# Patient Record
Sex: FEMALE | Race: WHITE | NOT HISPANIC OR LATINO | Employment: FULL TIME | URBAN - METROPOLITAN AREA
[De-identification: names, ages, dates, MRNs, and addresses within clinical notes are randomized per-mention and may not be internally consistent; named-entity substitution may affect disease eponyms.]

---

## 2021-08-19 ENCOUNTER — ULTRASOUND (OUTPATIENT)
Dept: OBGYN CLINIC | Facility: CLINIC | Age: 26
End: 2021-08-19
Payer: COMMERCIAL

## 2021-08-19 VITALS — DIASTOLIC BLOOD PRESSURE: 70 MMHG | SYSTOLIC BLOOD PRESSURE: 108 MMHG | WEIGHT: 120.8 LBS

## 2021-08-19 DIAGNOSIS — N91.1 SECONDARY AMENORRHEA: Primary | ICD-10-CM

## 2021-08-19 PROCEDURE — 76817 TRANSVAGINAL US OBSTETRIC: CPT | Performed by: STUDENT IN AN ORGANIZED HEALTH CARE EDUCATION/TRAINING PROGRAM

## 2021-08-19 PROCEDURE — 99204 OFFICE O/P NEW MOD 45 MIN: CPT | Performed by: STUDENT IN AN ORGANIZED HEALTH CARE EDUCATION/TRAINING PROGRAM

## 2021-08-26 ENCOUNTER — TELEMEDICINE (OUTPATIENT)
Dept: OBGYN CLINIC | Facility: CLINIC | Age: 26
End: 2021-08-26

## 2021-08-26 DIAGNOSIS — Z34.01 ENCOUNTER FOR SUPERVISION OF NORMAL FIRST PREGNANCY IN FIRST TRIMESTER: Primary | ICD-10-CM

## 2021-08-26 PROCEDURE — OBC: Performed by: STUDENT IN AN ORGANIZED HEALTH CARE EDUCATION/TRAINING PROGRAM

## 2021-08-26 RX ORDER — ACETAMINOPHEN 325 MG/1
650 TABLET ORAL EVERY 6 HOURS PRN
COMMUNITY
End: 2021-10-21 | Stop reason: ALTCHOICE

## 2021-08-26 NOTE — PROGRESS NOTES
OB INTAKE INTERVIEW  Patient is 26 y o y o  who presents for OB intake at 12-0 wks  She is accompanied by: phone interview  The father of her baby Lisa Gonzalez is involved in the pregnancy and they are     Last Menstrual Period: 2021  Ultrasound: Measured 11 weeks 0 days on 2021  Estimated Date of Delivery: 3/10/22 via US & LMP     Signs/Symptoms of Pregnancy  Current pregnancy symptoms: none  Constipation no  Headaches no  Cramping/spotting no  PICA cravings no    Diabetes-    If patient has 1 or more, please order early 1 hour GTT  History of GDM no  BMI >35 no  History of PCOS or current metformin use no  History of LGA/macrosomic infant (4000g/9lbs) no    If patient has 2 or more, please order early 1 hour GTT  BMI>30 no  AMA no  First degree relative with type 2 diabetes no  History of chronic HTN, hyperlipidemia, elevated A1C no  High risk race (, , ,  or ) no    Hypertension- if you answer yes, please order preeclampsia labs (cbc, comprehensive metabolic panel, urine protein creatinine ratio, uric acid)  History of of chronic HTN no  History of gestational HTN no  History of preeclampsia, eclampsia, or HELLP syndrome no  History of diabetes no  History of lupus, autoimmune disease, kidney disease no    Thyroid- if yes order TSH with reflex T4  History of thyroid disease no    Bleeding Disorder or Hx of DVT-patient or first degree relative with history of  Order the following if not done previously     (Factor V, antithrombin III, prothrombin gene mutation, protein C and S Ag, lupus anticoagulant, anticardiolipin, beta-2 glycoprotein)   no    OB/GYN-  History of abnormal pap smear no  History of HPV no  History of Herpes/HSV no  History of other STI (gonorrhea, chlamydia, trich) no  History of prior  no  History of prior  no  History of  delivery prior to 36 weeks 6 days no  History of blood transfusion no  Ok for blood transfusion yes    Substance screening- if yes outside of tobacco for her or anyone in her home-order urine drug screen  History of tobacco use no  Currently using tobacco no  Currently using alcohol no  Presently using drugs no  Past drug use  no  IV drug use-If yes add Hep C antibody to labs no  Partner drug use YES-marijuana Parent/Family drug use no     MRSA Screening-   Does the pt have a hx of MRSA? no  If yes- please follow MRSA protocol and obtain a nasal swab for MRSA culture    Immunizations:  Influenza vaccine given this season no  Discussed Tdap vaccine yes  Discussed COVID Vaccine yes      Genetic/MFM-  Do you or your partner have a history of any of the following in yourselves or first degree relatives? Cystic fibrosis no  Spinal muscular atrophy no  Hemoglobinopathy/Sickle Cell/Thalassemia no  Fragile X Intellectual Disability no    If yes, discuss carrier screening and recommend consultation with [facility]/genetic counseling  If no, discuss option for carrier screening and/or genetic testing with Nuchal Ultrasound  Patient interested yes  Appointment at [facility] made no- pt to call today for an appt    Interview education  [facility] Pregnancy Essentials Book reviewed and discussed yes    Nurse/Family Partnership- patient may qualify no; referral placed no    Prenatal lab work scripts yes  Extra labs ordered:  no    The patient has a history now or in prior pregnancy notable for:    Pt had Covid 2021 -  Pt is considering covid vaccine in 2nd trimester  Details that I feel the provider should be aware of: 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

## 2021-08-30 ENCOUNTER — TELEPHONE (OUTPATIENT)
Dept: PERINATAL CARE | Facility: CLINIC | Age: 26
End: 2021-08-30

## 2021-08-30 PROBLEM — Z34.01 ENCOUNTER FOR SUPERVISION OF NORMAL FIRST PREGNANCY IN FIRST TRIMESTER: Status: ACTIVE | Noted: 2021-08-30

## 2021-08-30 NOTE — ASSESSMENT & PLAN NOTE
Liza Ojeda is a 31y o  year old  at 12 5 weeks  presents for initial prenatal visit  Planned pregnancy  Works as a      PMHx noncontributory  The patient denies complaints  She was having N&V but that is starting to resolve  Denies pelvic pain, bleeding, LOF  Exam WNL  Prenatal labs WNL  Rh pos  Pap and gc/chl sent today  Reviewed options for low risk aneuploidy screening  Discussed risks/benefits/limitations  The patient reports she is scheduled for [facility] appt  Patient Education: Patient was counseled regarding diet, exercise, weight gain, foods to avoid, vaccines in pregnancy, aneuploidy screening, travel precautions to include seat belt use and VTE risk reduction  She has been provided our pregnancy packet which includes pregnancy warning signs,how and when to contact providers, visit intervals, Maternal fetal medicine information, medication recommendations that are safe during pregnancy, dietary suggestions, activity recommendations, breastfeeding information as well as websites for additional information, and delivery location

## 2021-08-30 NOTE — TELEPHONE ENCOUNTER
Phone call to patient and confirmed MFM NT US appt  Explained MFM sent an important message via [facility] this call was to follow up on Genetic Counselor video and if she had any questions regarding insurance? Patient states she watched video, found informative and checked her insurance, no questions

## 2021-08-30 NOTE — PATIENT INSTRUCTIONS
Maternal Fetal Medicine     Nuchal Translucency ( NT) ultrasound is offered in the first trimester of pregnancy to assess your developing baby and look for any markers that may indicate a risk for certain chromosomal conditions such as Down's syndrome  This ultrasound is offered to all pregnant women along with several options for blood screening  During your ultrasound appointment the Perinatologist will discuss these options and together you can decide which screen is best for you  We encourage you to view the following video prior to your appointment to learn more:  [email or web address]     Please check your individual insurance plan to determine if the screening is covered, requires prior authorization or if you will incur any out of pocket cost    It is also important to know if your insurance company has a preferred in network lab such as [facility] or [facility]      Below is list of CPT codes for blood screening options  CPT codes are procedure codes that tell your insurance company what testing is being done  In order for testing to be performed in a timely and efficient manner it is best if you have this information available before your first visit with our office  Please note, [facility] is called [facility]  Sequential Screen - 2 part screen, CPT codes are dependent on the lab used:     [facility] lab    Part 1 code 83077,75326      Part 2 code 04633,87882,07567, [address] [facility] 1 code  51494                Part 2 code 37245        NIPT (cell free DNA testing)  CPT code 54014        NIPT testing through [address] [place] [address] is called [id number]  Please use the  @ www  EDMdesigner   > click estimate my cost>  pregnancy>   [email or web address] plus  OR call # [phone] and speak to a      Be sure to ask about the [employer] program for additional financial assistance  NIPT testing through [employer] is called [facility]   Please use the  @ www  [email or web address]/mediaBunker  If you have any questions please feel free to reach out to our office at [phone]  Genetic Counseling appointments are available for any patient but strongly encouraged for Moms 28 or older or patients with family history of genetic disorders  Valuable Online Resource:    [facility] pregnancy essential guide    [email or web address]      On the right side of the screen is a 50 page guide providing valuable information about your entire pregnancy  On the left hand side of the site you will see several other links to great information and resources that [facility] offers     If you click on the tab that says "Pregnancy and Birth Packet" this opens another 150 page guide to labor and delivery information as well as breast feeding information,  care, pediatricians, car seat safety and much more     The [facility] and [address] tab has a virtual tour of the new L&D unit, as well as valuable information about classes that are offered, breast feeding support, support groups and much more  Click around and enjoy all we have to offer! Please note that all information in regards to locations and visiting hours have not been updated due to COVID    We only deliver our babies at one location which is at [facility] [address], [address] [facility] encourages vaccination to prevent from developing a severe COVID virus infection and the resultant maternal and fetal complications that can arise with a severe infection  Receiving the vaccine supplies antibodies directly to your baby through the placenta and through breastfeeding  We discussed that SARS-CoV-2  (COVID) vaccination is an option for  eligible pregnant and lactating women  The following information is from [facility]:    The [place] and Moderna mRNA  vaccines have not been tested in pregnant   women and breastfeeding women yet  None of the vaccines have live virus   and do not contain ingredients that are known to be harmful to pregnant   women or to the fetus  Any risks to the fetus are thought to be low and   theoretical   Some risks of the vaccine including injection site   reactions, fatigue, headache, myalgias, chills, and fever; fever can be   treated with acetaminophen  Allergic reactions have been reported to be   rare  The Leafy Muckle (Aponte Trina) Adenovector  vaccine has been associated   with a rare complication in women at < 48years of age  Until more data is   available [facility] is not recommending the use of this vaccine in pregnancy  ACOG and SMFM recommends that pregnant women have access to a vaccine and   that the vaccine not be withheld from pregnant or lactating women  For a COVID-19 Vaccine in Pregnancy Decision Aid, go to   https://[email or web address] [email or web address]      The ABM (Academy of Breastfeeding Medicine) Statement on Considerations   for COVID-19 Vaccination in Lactation is available at:   http://garo-fortune com/   -in-lactation  Finally, the CDC statement on Vaccination Consideration for People who are   Pregnant or Breastfeeding is available at:   InstantTyping com pt   y html

## 2021-08-31 ENCOUNTER — INITIAL PRENATAL (OUTPATIENT)
Dept: OBGYN CLINIC | Facility: CLINIC | Age: 26
End: 2021-08-31

## 2021-08-31 VITALS — DIASTOLIC BLOOD PRESSURE: 60 MMHG | WEIGHT: 123 LBS | SYSTOLIC BLOOD PRESSURE: 108 MMHG

## 2021-08-31 DIAGNOSIS — Z34.01 ENCOUNTER FOR SUPERVISION OF NORMAL FIRST PREGNANCY IN FIRST TRIMESTER: Primary | ICD-10-CM

## 2021-08-31 LAB
SL AMB  POCT GLUCOSE, UA: NORMAL
SL AMB POCT URINE PROTEIN: NORMAL

## 2021-08-31 PROCEDURE — PNV: Performed by: OBSTETRICS & GYNECOLOGY

## 2021-08-31 PROCEDURE — 87591 N.GONORRHOEAE DNA AMP PROB: CPT | Performed by: OBSTETRICS & GYNECOLOGY

## 2021-08-31 PROCEDURE — G0145 SCR C/V CYTO,THINLAYER,RESCR: HCPCS | Performed by: OBSTETRICS & GYNECOLOGY

## 2021-08-31 PROCEDURE — 87491 CHLMYD TRACH DNA AMP PROBE: CPT | Performed by: OBSTETRICS & GYNECOLOGY

## 2021-08-31 NOTE — PROGRESS NOTES
Patient presents for Pn1 visit    LMP-21  MELODIE-3/10/22  GA-12W 5D    Urine-neg  Last pap-DUE  GC Swab to be collected today  Pn1 labs not completed  Encouraged to have done before her next visit  Blue folder given at today's visit and thoroughly reviewed  No LOF, bleeding, cramping or discharge  No questions or concerns for today's visit

## 2021-09-02 LAB
C TRACH DNA SPEC QL NAA+PROBE: NEGATIVE
N GONORRHOEA DNA SPEC QL NAA+PROBE: NEGATIVE

## 2021-09-03 ENCOUNTER — ROUTINE PRENATAL (OUTPATIENT)
Dept: PERINATAL CARE | Facility: OTHER | Age: 26
End: 2021-09-03
Payer: COMMERCIAL

## 2021-09-03 VITALS
BODY MASS INDEX: 23.75 KG/M2 | WEIGHT: 121 LBS | HEIGHT: 60 IN | SYSTOLIC BLOOD PRESSURE: 104 MMHG | HEART RATE: 92 BPM | DIASTOLIC BLOOD PRESSURE: 61 MMHG

## 2021-09-03 DIAGNOSIS — Z3A.13 13 WEEKS GESTATION OF PREGNANCY: ICD-10-CM

## 2021-09-03 DIAGNOSIS — Z36.82 ENCOUNTER FOR ANTENATAL SCREENING FOR NUCHAL TRANSLUCENCY: ICD-10-CM

## 2021-09-03 DIAGNOSIS — O35.2XX0 HEREDITARY FAMILIAL DISEASE AFFECTING MANAGEMENT OF MOTHER AND POSSIBLY AFFECTING FETUS, ANTEPARTUM, SINGLE OR UNSPECIFIED FETUS: Primary | ICD-10-CM

## 2021-09-03 LAB
LAB AP GYN PRIMARY INTERPRETATION: NORMAL
Lab: NORMAL

## 2021-09-03 PROCEDURE — 76813 OB US NUCHAL MEAS 1 GEST: CPT | Performed by: OBSTETRICS & GYNECOLOGY

## 2021-09-03 PROCEDURE — 99203 OFFICE O/P NEW LOW 30 MIN: CPT | Performed by: OBSTETRICS & GYNECOLOGY

## 2021-09-03 NOTE — LETTER
September 5, 2021     [address] More[address]  [address] [address]    Patient: Fabio Cardenas   YOB: 1995   Date of Visit: 9/3/2021       Dear Dr Becki Bojorquez: Thank you for referring Fabio Cardenas to me for evaluation  Below are my notes for this consultation  If you have questions, please do not hesitate to call me  I look forward to following your patient along with you  Sincerely,        Chikis Mondragon MD        CC: No Recipients  Chikis Mondragon MD  9/3/2021  7:54 AM  Sign when Signing Visit   Please refer to the [facility] ultrasound report in Ob Procedures for additional information regarding today's visit

## 2021-09-03 NOTE — PROGRESS NOTES
Please refer to the [facility] ultrasound report in Ob Procedures for additional information regarding today's visit

## 2021-09-03 NOTE — PROGRESS NOTES
Patient chose to use [facility] lab and was given lab slip for Noninvasive Prenatal Screen [facility]  Blood sample is drawn at [facility] and online appointment scheduling and lab locations can be found @ www  [employer]     [facility]  card given, patient instructed how to check her out- of -pocket responsibility @ [employer]  Patient made aware if [facility]  unable to give an estimate she will need to contact [facility] office prior to blood draw  Insurance may require prior authorization, if test drawn without prior authorization pateint will be responsible for full cost of test   All [employer] products require prior authorization @ this time, [facility] office will initiate the authorization, this may take 7-14 days  Patient aware that  is provided by third party and is only an estimate of cost not a guarantee  For definitive cost, patient encouraged to call her insurance provider- insurance phone # located on the back of her ID card  Patient verbalized understanding of all instructions and no questions at this time

## 2021-09-07 ENCOUNTER — TELEPHONE (OUTPATIENT)
Dept: PEDIATRIC CARDIOLOGY | Facility: CLINIC | Age: 26
End: 2021-09-07

## 2021-09-15 ENCOUNTER — TELEPHONE (OUTPATIENT)
Dept: OBGYN CLINIC | Facility: CLINIC | Age: 26
End: 2021-09-15

## 2021-09-15 NOTE — TELEPHONE ENCOUNTER
Spoke to pt in re: to both issues  Stated started few days ago, no OTC meds taken for the discomfort  Right arm: intermittent discomfort/numbness of bicep area  ( described it as if you carried grocery bags in the crease of your elbow)    Left back: bottom, lower area only on left side  Thinks it is sciatic pain  That is daily but not all day long  Pain does not shoot down her leg at all  Asked pt if she had any UTI sx, fever, chills and she denies      Last appt was 8/31 and she has an upcoming appt on 9/23

## 2021-09-15 NOTE — TELEPHONE ENCOUNTER
Pt is 14 wks & having R arm pain & lower left back pain for 5 days,   pls call pt to advise,  thanks

## 2021-09-15 NOTE — TELEPHONE ENCOUNTER
Per comm consent lm to pt with msg from [facility] as follows     "Paraesthesias in pregnancy are fairly common  She may try tylenol, ice, warmth, massage, asper cream to area   Look up on our site in preg essential guide meli stretching and exercises for back pain   She can go for massage, consult with ortho, I can order PT"    Told her to let us know if she would like referral to please call us back

## 2021-09-22 NOTE — PATIENT INSTRUCTIONS
Pregnancy at 15 to 18 Weeks   AMBULATORY CARE:   What changes are happening to your body:  Now that you are in your second trimester, you have more energy  You may also feel hungrier than usual  You may start to experience other symptoms, such as heartburn or dizziness  You may be gaining about ½ to 1 pound a week, and your pregnancy is beginning to show  You may need to start wearing maternity clothes  Seek care immediately if:   · You have pain or cramping in your abdomen or low back  · You have heavy vaginal bleeding or clotting  · You pass material that looks like tissue or large clots  Collect the material and bring it with you  Contact your healthcare provider if:   · You cannot keep food or drinks down, and you are losing weight  · You have light bleeding  · You have chills or a fever  · You have vaginal itching, burning, or pain  · You have yellow, green, white, or foul-smelling vaginal discharge  · You have pain or burning when you urinate, less urine than usual, or pink or bloody urine  · You have questions or concerns about your condition or care  How to care for yourself at this stage of your pregnancy:   · Manage heartburn  by eating 4 or 5 small meals each day instead of large meals  Avoid spicy foods  Avoid eating right before bedtime  · Manage nausea and vomiting  Avoid fatty and spicy foods  Eat small meals throughout the day instead of large meals  Kiera may help to decrease nausea  Ask your healthcare provider about other ways of decreasing nausea and vomiting  · Eat a variety of healthy foods  Healthy foods include fruits, vegetables, whole-grain breads, low-fat dairy foods, beans, lean meats, and fish  Drink liquids as directed  Ask how much liquid to drink each day and which liquids are best for you  Limit caffeine to less than 200 milligrams each day  Limit your intake of fish to 2 servings each week   Choose fish low in mercury such as canned light tuna, shrimp, salmon, cod, or tilapia  Do not  eat fish high in mercury such as swordfish, tilefish, jorje mackerel, and shark  · Take prenatal vitamins as directed  Your need for certain vitamins and minerals, such as folic acid, increases during pregnancy  Prenatal vitamins provide some of the extra vitamins and minerals you need  Prenatal vitamins may also help to decrease the risk of certain birth defects  · Do not smoke  Smoking increases your risk of a miscarriage and other health problems during your pregnancy  Smoking can cause your baby to be born too early or weigh less at birth  Ask your healthcare provider for information if you need help quitting  · Do not drink alcohol  Alcohol passes from your body to your baby through the placenta  It can affect your baby's brain development and cause fetal alcohol syndrome (FAS)  FAS is a group of conditions that causes mental, behavior, and growth problems  · Talk to your healthcare provider before you take any medicines  Many medicines may harm your baby if you take them when you are pregnant  Do not take any medicines, vitamins, herbs, or supplements without first talking to your healthcare provider  Never use illegal or street drugs (such as marijuana or cocaine) while you are pregnant  Safety tips during pregnancy:   · Avoid hot tubs and saunas  Do not use a hot tub or sauna while you are pregnant, especially during your first trimester  Hot tubs and saunas may raise your baby's temperature and increase the risk of birth defects  · Avoid toxoplasmosis  This is an infection caused by eating raw meat or being around infected cat feces  It can cause birth defects, miscarriages, and other problems  Wash your hands after you touch raw meat  Make sure any meat is well-cooked before you eat it  Avoid raw eggs and unpasteurized milk  Use gloves or ask someone else to clean your cat's litter box while you are pregnant      Changes that are happening with your baby:  By 18 weeks, your baby may be about 6 inches long from the top of the head to the rump (baby's bottom)  Your baby may weigh about 11 ounces  You may be able to feel your baby's movement at about 18 weeks or later  The first movements may not be that noticeable  They may feel like a fluttering sensation  Your baby also makes sucking movements and can hear certain sounds  What you need to know about prenatal care:  During the first 28 weeks of your pregnancy, you will see your healthcare provider once a month  Your healthcare provider will check your blood pressure and weight  You may also need any of the following:  · A urine test  may also be done to check for sugar and protein  These can be signs of gestational diabetes or infection  · A blood test  may be done to check for anemia (low iron level)  · Fundal height check  is a measurement of your uterus to check your baby's growth  This number is usually the same as the number of weeks that you have been pregnant  · An ultrasound  may be done to check your baby's development  Your healthcare provider may be able to tell you what your baby's gender is during the ultrasound  · Your baby's heart rate  will be checked  © Copyright [employer] 2021 Information is for End User's use only and may not be sold, redistributed or otherwise used for commercial purposes  All illustrations and images included in CareNotes® are the copyrighted property of [employer]  or Pat Villalobos   The above information is an  only  It is not intended as medical advice for individual conditions or treatments  Talk to your doctor, nurse or pharmacist before following any medical regimen to see if it is safe and effective for you

## 2021-09-23 ENCOUNTER — ROUTINE PRENATAL (OUTPATIENT)
Dept: OBGYN CLINIC | Facility: CLINIC | Age: 26
End: 2021-09-23

## 2021-09-23 VITALS
HEIGHT: 60 IN | BODY MASS INDEX: 24.5 KG/M2 | WEIGHT: 124.8 LBS | SYSTOLIC BLOOD PRESSURE: 114 MMHG | DIASTOLIC BLOOD PRESSURE: 70 MMHG

## 2021-09-23 DIAGNOSIS — Z34.01 ENCOUNTER FOR SUPERVISION OF NORMAL FIRST PREGNANCY IN FIRST TRIMESTER: Primary | ICD-10-CM

## 2021-09-23 LAB
SL AMB  POCT GLUCOSE, UA: NEGATIVE
SL AMB POCT URINE PROTEIN: NEGATIVE

## 2021-09-23 PROCEDURE — PNV: Performed by: NURSE PRACTITIONER

## 2021-09-23 NOTE — ASSESSMENT & PLAN NOTE
Return OB  Denies LOF, vag blding, ctxs, cramping and no FM felt yet  Still needs to complete OB labs, was not aware she had them ordered  Hep C added  MFM US sched for 10/21/21  Taking PNV daily as prescribed  Denies any problems today  Reviewed SAB precautions

## 2021-09-23 NOTE — PROGRESS NOTES
Encounter for supervision of normal first pregnancy in first trimester  Return OB  Denies LOF, vag blding, ctxs, cramping and no FM felt yet  Still needs to complete OB labs, was not aware she had them ordered  Hep C added  MFM US sched for 10/21/21  Taking PNV daily as prescribed  Denies any problems today  Reviewed SAB precautions

## 2021-09-28 ENCOUNTER — TELEPHONE (OUTPATIENT)
Dept: PEDIATRIC CARDIOLOGY | Facility: CLINIC | Age: 26
End: 2021-09-28

## 2021-09-28 DIAGNOSIS — I37.0 PULMONARY VALVE STENOSIS, UNSPECIFIED ETIOLOGY: Primary | ICD-10-CM

## 2021-09-28 NOTE — TELEPHONE ENCOUNTER
Called and LM on pt's voicemail asking for prior cardiac records, left call back phone number to contact the office

## 2021-10-07 ENCOUNTER — CONSULT (OUTPATIENT)
Dept: PEDIATRIC CARDIOLOGY | Facility: CLINIC | Age: 26
End: 2021-10-07
Payer: COMMERCIAL

## 2021-10-07 VITALS
SYSTOLIC BLOOD PRESSURE: 90 MMHG | WEIGHT: 126 LBS | DIASTOLIC BLOOD PRESSURE: 50 MMHG | HEIGHT: 61 IN | HEART RATE: 100 BPM | BODY MASS INDEX: 23.79 KG/M2 | OXYGEN SATURATION: 98 %

## 2021-10-07 DIAGNOSIS — Z87.74 S/P REPAIR OF PDA: Primary | ICD-10-CM

## 2021-10-07 PROCEDURE — 99204 OFFICE O/P NEW MOD 45 MIN: CPT | Performed by: PEDIATRICS

## 2021-10-08 PROCEDURE — 93000 ELECTROCARDIOGRAM COMPLETE: CPT | Performed by: PEDIATRICS

## 2021-10-09 ENCOUNTER — APPOINTMENT (OUTPATIENT)
Dept: LAB | Facility: HOSPITAL | Age: 26
End: 2021-10-09
Attending: STUDENT IN AN ORGANIZED HEALTH CARE EDUCATION/TRAINING PROGRAM
Payer: COMMERCIAL

## 2021-10-09 DIAGNOSIS — Z34.01 ENCOUNTER FOR SUPERVISION OF NORMAL FIRST PREGNANCY IN FIRST TRIMESTER: ICD-10-CM

## 2021-10-09 LAB
ABO GROUP BLD: NORMAL
BACTERIA UR QL AUTO: NORMAL /HPF
BASOPHILS # BLD AUTO: 0.03 THOUSANDS/ΜL (ref 0–0.1)
BASOPHILS NFR BLD AUTO: 0 % (ref 0–1)
BILIRUB UR QL STRIP: NEGATIVE
BLD GP AB SCN SERPL QL: NEGATIVE
CLARITY UR: CLEAR
COLOR UR: YELLOW
EOSINOPHIL # BLD AUTO: 0.07 THOUSAND/ΜL (ref 0–0.61)
EOSINOPHIL NFR BLD AUTO: 1 % (ref 0–6)
ERYTHROCYTE [DISTWIDTH] IN BLOOD BY AUTOMATED COUNT: 13.2 % (ref 11.6–15.1)
GLUCOSE UR STRIP-MCNC: NEGATIVE MG/DL
HCT VFR BLD AUTO: 35.7 % (ref 34.8–46.1)
HGB BLD-MCNC: 12.3 G/DL (ref 11.5–15.4)
HGB UR QL STRIP.AUTO: NEGATIVE
IMM GRANULOCYTES # BLD AUTO: 0.05 THOUSAND/UL (ref 0–0.2)
IMM GRANULOCYTES NFR BLD AUTO: 1 % (ref 0–2)
KETONES UR STRIP-MCNC: NEGATIVE MG/DL
LEUKOCYTE ESTERASE UR QL STRIP: NEGATIVE
LYMPHOCYTES # BLD AUTO: 1.45 THOUSANDS/ΜL (ref 0.6–4.47)
LYMPHOCYTES NFR BLD AUTO: 19 % (ref 14–44)
MCH RBC QN AUTO: 31.9 PG (ref 26.8–34.3)
MCHC RBC AUTO-ENTMCNC: 34.5 G/DL (ref 31.4–37.4)
MCV RBC AUTO: 93 FL (ref 82–98)
MONOCYTES # BLD AUTO: 0.49 THOUSAND/ΜL (ref 0.17–1.22)
MONOCYTES NFR BLD AUTO: 7 % (ref 4–12)
NEUTROPHILS # BLD AUTO: 5.47 THOUSANDS/ΜL (ref 1.85–7.62)
NEUTS SEG NFR BLD AUTO: 72 % (ref 43–75)
NITRITE UR QL STRIP: NEGATIVE
NON-SQ EPI CELLS URNS QL MICRO: NORMAL /HPF
NRBC BLD AUTO-RTO: 0 /100 WBCS
PH UR STRIP.AUTO: 7 [PH]
PLATELET # BLD AUTO: 222 THOUSANDS/UL (ref 149–390)
PMV BLD AUTO: 10.8 FL (ref 8.9–12.7)
PROT UR STRIP-MCNC: NEGATIVE MG/DL
RBC # BLD AUTO: 3.85 MILLION/UL (ref 3.81–5.12)
RBC #/AREA URNS AUTO: NORMAL /HPF
RH BLD: POSITIVE
SP GR UR STRIP.AUTO: 1.02 (ref 1–1.03)
SPECIMEN EXPIRATION DATE: NORMAL
UROBILINOGEN UR QL STRIP.AUTO: 0.2 E.U./DL
WBC # BLD AUTO: 7.56 THOUSAND/UL (ref 4.31–10.16)
WBC #/AREA URNS AUTO: NORMAL /HPF

## 2021-10-09 PROCEDURE — 36415 COLL VENOUS BLD VENIPUNCTURE: CPT

## 2021-10-09 PROCEDURE — 80081 OBSTETRIC PANEL INC HIV TSTG: CPT

## 2021-10-09 PROCEDURE — 86803 HEPATITIS C AB TEST: CPT

## 2021-10-09 PROCEDURE — 87086 URINE CULTURE/COLONY COUNT: CPT

## 2021-10-09 PROCEDURE — 81001 URINALYSIS AUTO W/SCOPE: CPT

## 2021-10-10 LAB
HBV SURFACE AG SER QL: NORMAL
HCV AB SER QL: NORMAL
HIV 1+2 AB+HIV1 P24 AG SERPL QL IA: NORMAL
RUBV IGG SERPL IA-ACNC: >175 IU/ML

## 2021-10-11 LAB
BACTERIA UR CULT: ABNORMAL
BACTERIA UR CULT: ABNORMAL
RPR SER QL: NORMAL

## 2021-10-13 ENCOUNTER — TELEPHONE (OUTPATIENT)
Dept: OBGYN CLINIC | Facility: CLINIC | Age: 26
End: 2021-10-13

## 2021-10-15 ENCOUNTER — TELEPHONE (OUTPATIENT)
Dept: PERINATAL CARE | Facility: CLINIC | Age: 26
End: 2021-10-15

## 2021-10-25 ENCOUNTER — ROUTINE PRENATAL (OUTPATIENT)
Dept: OBGYN CLINIC | Facility: CLINIC | Age: 26
End: 2021-10-25

## 2021-10-25 VITALS — SYSTOLIC BLOOD PRESSURE: 110 MMHG | DIASTOLIC BLOOD PRESSURE: 68 MMHG | BODY MASS INDEX: 24.51 KG/M2 | WEIGHT: 128 LBS

## 2021-10-25 DIAGNOSIS — Z87.74 HISTORY OF CONGENITAL HEART DEFECT: ICD-10-CM

## 2021-10-25 DIAGNOSIS — Z28.310 COVID-19 VACCINE SERIES DECLINED: ICD-10-CM

## 2021-10-25 DIAGNOSIS — R82.71 GBS BACTERIURIA: ICD-10-CM

## 2021-10-25 DIAGNOSIS — Z28.21 COVID-19 VACCINE SERIES DECLINED: ICD-10-CM

## 2021-10-25 DIAGNOSIS — Z34.02 ENCOUNTER FOR SUPERVISION OF NORMAL FIRST PREGNANCY IN SECOND TRIMESTER: Primary | ICD-10-CM

## 2021-10-25 DIAGNOSIS — Z28.21 INFLUENZA VACCINATION DECLINED: ICD-10-CM

## 2021-10-25 PROBLEM — Z34.01 ENCOUNTER FOR SUPERVISION OF NORMAL FIRST PREGNANCY IN FIRST TRIMESTER: Status: RESOLVED | Noted: 2021-08-30 | Resolved: 2021-10-25

## 2021-10-25 PROCEDURE — PNV: Performed by: NURSE PRACTITIONER

## 2021-10-27 ENCOUNTER — TELEPHONE (OUTPATIENT)
Dept: PERINATAL CARE | Facility: CLINIC | Age: 26
End: 2021-10-27

## 2021-10-28 ENCOUNTER — ROUTINE PRENATAL (OUTPATIENT)
Dept: PERINATAL CARE | Facility: OTHER | Age: 26
End: 2021-10-28
Payer: COMMERCIAL

## 2021-10-28 VITALS
HEIGHT: 60 IN | SYSTOLIC BLOOD PRESSURE: 97 MMHG | DIASTOLIC BLOOD PRESSURE: 65 MMHG | BODY MASS INDEX: 25.13 KG/M2 | WEIGHT: 128 LBS | HEART RATE: 103 BPM

## 2021-10-28 DIAGNOSIS — Z36.86 ENCOUNTER FOR ANTENATAL SCREENING FOR CERVICAL LENGTH: ICD-10-CM

## 2021-10-28 DIAGNOSIS — Z3A.21 21 WEEKS GESTATION OF PREGNANCY: ICD-10-CM

## 2021-10-28 DIAGNOSIS — O35.2XX0 HEREDITARY DISEASE IN FAMILY POSSIBLY AFFECTING FETUS, AFFECTING MANAGEMENT OF MOTHER IN PREGNANCY, SINGLE OR UNSPECIFIED FETUS: Primary | ICD-10-CM

## 2021-10-28 PROCEDURE — 76811 OB US DETAILED SNGL FETUS: CPT | Performed by: OBSTETRICS & GYNECOLOGY

## 2021-10-28 PROCEDURE — 76817 TRANSVAGINAL US OBSTETRIC: CPT | Performed by: OBSTETRICS & GYNECOLOGY

## 2021-10-28 PROCEDURE — 99213 OFFICE O/P EST LOW 20 MIN: CPT | Performed by: OBSTETRICS & GYNECOLOGY

## 2021-11-15 ENCOUNTER — ROUTINE PRENATAL (OUTPATIENT)
Dept: OBGYN CLINIC | Facility: CLINIC | Age: 26
End: 2021-11-15

## 2021-11-15 VITALS
HEIGHT: 60 IN | DIASTOLIC BLOOD PRESSURE: 74 MMHG | SYSTOLIC BLOOD PRESSURE: 110 MMHG | WEIGHT: 134 LBS | BODY MASS INDEX: 26.31 KG/M2

## 2021-11-15 DIAGNOSIS — R82.71 GBS BACTERIURIA: ICD-10-CM

## 2021-11-15 DIAGNOSIS — Z87.74 HISTORY OF CONGENITAL HEART DEFECT: ICD-10-CM

## 2021-11-15 DIAGNOSIS — Z34.02 ENCOUNTER FOR SUPERVISION OF NORMAL FIRST PREGNANCY IN SECOND TRIMESTER: Primary | ICD-10-CM

## 2021-11-15 LAB
SL AMB  POCT GLUCOSE, UA: NORMAL
SL AMB POCT URINE PROTEIN: NORMAL

## 2021-11-15 PROCEDURE — PNV: Performed by: STUDENT IN AN ORGANIZED HEALTH CARE EDUCATION/TRAINING PROGRAM

## 2021-11-16 ENCOUNTER — ROUTINE PRENATAL (OUTPATIENT)
Dept: PEDIATRIC CARDIOLOGY | Facility: CLINIC | Age: 26
End: 2021-11-16
Payer: COMMERCIAL

## 2021-11-16 DIAGNOSIS — Z87.74 S/P REPAIR OF PDA: Primary | ICD-10-CM

## 2021-11-16 PROCEDURE — 93325 DOPPLER ECHO COLOR FLOW MAPG: CPT | Performed by: PEDIATRICS

## 2021-11-16 PROCEDURE — 76827 ECHO EXAM OF FETAL HEART: CPT | Performed by: PEDIATRICS

## 2021-11-16 PROCEDURE — 76825 ECHO EXAM OF FETAL HEART: CPT | Performed by: PEDIATRICS

## 2021-11-16 PROCEDURE — 99214 OFFICE O/P EST MOD 30 MIN: CPT | Performed by: PEDIATRICS

## 2021-11-16 PROCEDURE — 76820 UMBILICAL ARTERY ECHO: CPT | Performed by: PEDIATRICS

## 2021-12-13 ENCOUNTER — ROUTINE PRENATAL (OUTPATIENT)
Dept: OBGYN CLINIC | Facility: MEDICAL CENTER | Age: 26
End: 2021-12-13

## 2021-12-13 VITALS — DIASTOLIC BLOOD PRESSURE: 66 MMHG | WEIGHT: 139.2 LBS | SYSTOLIC BLOOD PRESSURE: 96 MMHG | BODY MASS INDEX: 27.19 KG/M2

## 2021-12-13 DIAGNOSIS — Z34.02 ENCOUNTER FOR SUPERVISION OF NORMAL FIRST PREGNANCY IN SECOND TRIMESTER: Primary | ICD-10-CM

## 2021-12-13 DIAGNOSIS — R82.71 GBS BACTERIURIA: ICD-10-CM

## 2021-12-13 DIAGNOSIS — Z87.74 HISTORY OF CONGENITAL HEART DEFECT: ICD-10-CM

## 2021-12-13 LAB
SL AMB  POCT GLUCOSE, UA: NORMAL
SL AMB POCT URINE PROTEIN: NORMAL

## 2021-12-13 PROCEDURE — PNV: Performed by: NURSE PRACTITIONER

## 2021-12-18 ENCOUNTER — APPOINTMENT (OUTPATIENT)
Dept: LAB | Facility: HOSPITAL | Age: 26
End: 2021-12-18
Attending: STUDENT IN AN ORGANIZED HEALTH CARE EDUCATION/TRAINING PROGRAM
Payer: COMMERCIAL

## 2021-12-18 DIAGNOSIS — Z34.02 ENCOUNTER FOR SUPERVISION OF NORMAL FIRST PREGNANCY IN SECOND TRIMESTER: ICD-10-CM

## 2021-12-18 DIAGNOSIS — N30.00 ACUTE CYSTITIS WITHOUT HEMATURIA: ICD-10-CM

## 2021-12-18 LAB
BASOPHILS # BLD AUTO: 0.02 THOUSANDS/ΜL (ref 0–0.1)
BASOPHILS NFR BLD AUTO: 0 % (ref 0–1)
EOSINOPHIL # BLD AUTO: 0.09 THOUSAND/ΜL (ref 0–0.61)
EOSINOPHIL NFR BLD AUTO: 1 % (ref 0–6)
ERYTHROCYTE [DISTWIDTH] IN BLOOD BY AUTOMATED COUNT: 12.3 % (ref 11.6–15.1)
GLUCOSE 1H P 50 G GLC PO SERPL-MCNC: 141 MG/DL (ref 40–134)
HCT VFR BLD AUTO: 36.6 % (ref 34.8–46.1)
HGB BLD-MCNC: 12.2 G/DL (ref 11.5–15.4)
IMM GRANULOCYTES # BLD AUTO: 0.04 THOUSAND/UL (ref 0–0.2)
IMM GRANULOCYTES NFR BLD AUTO: 1 % (ref 0–2)
LYMPHOCYTES # BLD AUTO: 1.31 THOUSANDS/ΜL (ref 0.6–4.47)
LYMPHOCYTES NFR BLD AUTO: 16 % (ref 14–44)
MCH RBC QN AUTO: 31.8 PG (ref 26.8–34.3)
MCHC RBC AUTO-ENTMCNC: 33.3 G/DL (ref 31.4–37.4)
MCV RBC AUTO: 95 FL (ref 82–98)
MONOCYTES # BLD AUTO: 0.5 THOUSAND/ΜL (ref 0.17–1.22)
MONOCYTES NFR BLD AUTO: 6 % (ref 4–12)
NEUTROPHILS # BLD AUTO: 6.44 THOUSANDS/ΜL (ref 1.85–7.62)
NEUTS SEG NFR BLD AUTO: 76 % (ref 43–75)
NRBC BLD AUTO-RTO: 0 /100 WBCS
PLATELET # BLD AUTO: 240 THOUSANDS/UL (ref 149–390)
PMV BLD AUTO: 10.4 FL (ref 8.9–12.7)
RBC # BLD AUTO: 3.84 MILLION/UL (ref 3.81–5.12)
WBC # BLD AUTO: 8.4 THOUSAND/UL (ref 4.31–10.16)

## 2021-12-18 PROCEDURE — 82950 GLUCOSE TEST: CPT

## 2021-12-18 PROCEDURE — 85025 COMPLETE CBC W/AUTO DIFF WBC: CPT

## 2021-12-18 PROCEDURE — 87086 URINE CULTURE/COLONY COUNT: CPT

## 2021-12-18 PROCEDURE — 86592 SYPHILIS TEST NON-TREP QUAL: CPT

## 2021-12-18 PROCEDURE — 36415 COLL VENOUS BLD VENIPUNCTURE: CPT

## 2021-12-20 LAB — RPR SER QL: NORMAL

## 2021-12-21 LAB — BACTERIA UR CULT: NORMAL

## 2021-12-22 ENCOUNTER — TELEPHONE (OUTPATIENT)
Dept: OBGYN CLINIC | Facility: CLINIC | Age: 26
End: 2021-12-22

## 2022-01-01 NOTE — PATIENT INSTRUCTIONS
Valuable Online Resource:    [facility] pregnancy essential guide    [email or web address]      On the right side of the screen is a 50 page guide providing valuable information about your entire pregnancy  On the left hand side of the site you will see several other links to great information and resources that [address] [place] [address] [facility] offers     If you click on the tab that says "Pregnancy and Birth Packet" this opens another  guide to labor and delivery information as well as breast feeding information,  care, pediatricians, car seat safety and much more     The [facility] and [address] tab has a virtual tour of the new L&D unit, as well as valuable information about classes that are offered, breast feeding support, support groups and much more  I highly recommend the virtual Breast Feeding class, very informational even if you have breast fed in the past  Check for available dates ! Click around and enjoy all we have to offer! Please note that all information in regards to locations and visiting hours have not been updated due to [address] delivery location is [address] [place] [address],[address], [address]  Pregnancy at 32 to [address] [facility]:   Changes happening with your body: You may continue to have symptoms such as shortness of breath, heartburn, contractions, or swelling of your ankles and feet  You may be gaining about 1 pound a week now  Seek care immediately if:   · You develop a severe headache that does not go away  · You have new or increased vision changes, such as blurred or spotted vision  · You have new or increased swelling in your face or hands  · You have vaginal spotting or bleeding  · Your water broke or you feel warm water gushing or trickling from your vagina  Call your obstetrician if:   · You have more than 5 contractions in 1 hour      · You notice any changes in your baby's movements  · You have abdominal cramps, pressure, or tightening  · You have a change in vaginal discharge  · You have chills or a fever  · You have vaginal itching, burning, or pain  · You have yellow, green, white, or foul-smelling vaginal discharge  · You have pain or burning when you urinate, less urine than usual, or pink or bloody urine  · You have questions or concerns about your condition or care  How to care for yourself at this stage of your pregnancy:       · Eat a variety of healthy foods  Healthy foods include fruits, vegetables, whole-grain breads, low-fat dairy foods, beans, lean meats, and fish  Drink liquids as directed  Ask how much liquid to drink each day and which liquids are best for you  Limit caffeine to less than 200 milligrams each day  Limit your intake of fish to 2 servings each week  Choose fish low in mercury such as canned light tuna, shrimp, salmon, cod, or tilapia  Do not  eat fish high in mercury such as swordfish, tilefish, jorje mackerel, and shark  · Manage heartburn  by eating 4 or 5 small meals each day instead of large meals  Avoid spicy food  · Manage swelling  by lying down and putting your feet up  · Take prenatal vitamins as directed  Your need for certain vitamins and minerals, such as folic acid, increases during pregnancy  Prenatal vitamins provide some of the extra vitamins and minerals you need  Prenatal vitamins may also help to decrease the risk of certain birth defects  · Talk to your healthcare provider about exercise  Moderate exercise can help you stay fit  Your healthcare provider will help you plan an exercise program that is safe for you during pregnancy  · Do not smoke  Smoking increases your risk of a miscarriage and other health problems during your pregnancy  Smoking can cause your baby to be born too early or weigh less at birth   Ask your healthcare provider for information if you need help quitting  · Do not drink alcohol  Alcohol passes from your body to your baby through the placenta  It can affect your baby's brain development and cause fetal alcohol syndrome (FAS)  FAS is a group of conditions that causes mental, behavior, and growth problems  · Talk to your healthcare provider before you take any medicines  Many medicines may harm your baby if you take them when you are pregnant  Do not take any medicines, vitamins, herbs, or supplements without first talking to your healthcare provider  Never use illegal or street drugs (such as marijuana or cocaine) while you are pregnant  Safety tips during pregnancy:   · Avoid hot tubs and saunas  Do not use a hot tub or sauna while you are pregnant, especially during your first trimester  Hot tubs and saunas may raise your baby's temperature and increase the risk of birth defects  · Avoid toxoplasmosis  This is an infection caused by eating raw meat or being around infected cat feces  It can cause birth defects, miscarriages, and other problems  Wash your hands after you touch raw meat  Make sure any meat is well-cooked before you eat it  Avoid raw eggs and unpasteurized milk  Use gloves or ask someone else to clean your cat's litter box while you are pregnant  Changes happening with your baby:  By 34 weeks, your baby may weigh more than 5 pounds  Your baby will be about 12 ½ inches long from the top of the head to the rump (baby's bottom)  Your baby is gaining about ½ pound a week  Your baby's eyes open and close now  Your baby's kicks and movements are more forceful at this time  What you need to know about prenatal care: Your healthcare provider will check your blood pressure and weight  You may also need the following:  · A urine test  may also be done to check for sugar and protein  These can be signs of gestational diabetes or infection  Protein in your urine may also be a sign of preeclampsia   Preeclampsia is a condition that can develop during week 20 or later of your pregnancy  It causes high blood pressure, and it can cause problems with your kidneys and other organs  · A Tdap vaccine  may be recommended by your healthcare provider  · Fundal height  is a measurement of your uterus to check your baby's growth  This number is usually the same as the number of weeks that you have been pregnant  Your healthcare provider may also check your baby's position  · Your baby's heart rate  will be checked  Follow up with your obstetrician as directed:  Write down your questions so you remember to ask them during your visits  © Copyright [employer] 2021 Information is for End User's use only and may not be sold, redistributed or otherwise used for commercial purposes  All illustrations and images included in CareNotes® are the copyrighted property of A D A M , Inc  or [facility] Tanvi Villalobos   The above information is an  only  It is not intended as medical advice for individual conditions or treatments  Talk to your doctor, nurse or pharmacist before following any medical regimen to see if it is safe and effective for you  Kick Counts in Pregnancy   AMBULATORY CARE:   Kick counts  measure how much your baby is moving in your womb  A kick from your baby can be felt as a twist, turn, swish, roll, or jab  It is common to feel your baby kicking at 26 to 28 weeks of pregnancy  You may feel your baby kick as early as 20 weeks of pregnancy  You may want to start counting at 28 weeks  Contact your doctor immediately if:   · You feel a change in the number of kicks or movements of your baby  · You feel fewer than 10 kicks within 2 hours  · You have questions or concerns about your baby's movements  Why measure kick counts:  Your baby's movement may provide information about your baby's health  He or she may move less, or not at all, if there are problems   Your baby may move less if he or she is not getting enough oxygen or nutrition from the placenta  Do not smoke while you are pregnant  Smoking decreases the amount of oxygen that gets to your baby  Talk to your healthcare provider if you need help to quit smoking  Tell your healthcare provider as soon as you feel a change in your baby's movements  When to measure kick counts:   · Measure kick counts at the same time every day  · Measure kick counts when your baby is awake and most active  Your baby may be most active in the evening  How to measure kick counts:  Check that your baby is awake before you measure kick counts  You can wake up your baby by lightly pushing on your belly, walking, or drinking something cold  Your healthcare provider may tell you different ways to measure kick counts  You may be told to do the following:  · Use a chart or clock to keep track of the time you start and finish counting  · Sit in a chair or lie on your left side  · Place your hands on the largest part of your belly  · Count until you reach 10 kicks  Write down how much time it takes to count 10 kicks  · It may take 30 minutes to 2 hours to count 10 kicks  It should not take more than 2 hours to count 10 kicks  Follow up with your doctor as directed:  Write down your questions so you remember to ask them during your visits  © Copyright [employer] 2021 Information is for End User's use only and may not be sold, redistributed or otherwise used for commercial purposes  All illustrations and images included in CareNotes® are the copyrighted property of A D A M , Inc  or [facility] Tanvi Villalobos   The above information is an  only  It is not intended as medical advice for individual conditions or treatments  Talk to your doctor, nurse or pharmacist before following any medical regimen to see if it is safe and effective for you      Perineal Massage    Perineal massage is recommended starting after 34 weeks in order to reduce risks of perineal tearing during childbirth  You have been provided and instructional sheet in your yellow 28 week prenatal packet  Early Labor Signs   AMBULATORY CARE:   Early labor signs and symptoms  are the changes in your body that signal your baby is getting ready to be delivered  Early labor signs can happen weeks, days or hours before delivery  Call 911 for any of the following:   · You have heavy vaginal bleeding  · You cannot get to the hospital before the baby starts to come out  Seek care immediately if:   · You have regular, painful contractions that are less than 5 minutes apart and last 30 to 70 seconds each  · You have a constant trickle or sudden gush of clear fluid from your vagina  · You notice a sudden decrease in your baby's movement  Contact your obstetrician or healthcare provider if:   · You have pain in your lower back or abdomen that does not get better when you change positions  · You have bloody mucus or show  · You have questions or concerns about your condition or care  Early labor signs and symptoms:   · Lightening  occurs when your baby drops inside your pelvis  You may feel increased pressure in your pelvis  This may happen a few weeks to a few hours before your labor begins  · Contractions  are cramps and tightening that occur in your uterus to help move the baby through your birth canal  Contractions occur regularly and more often each time  Each one lasts about 30 to 70 seconds, and gets stronger until you deliver your baby  Contractions do not go away with movement  The pain usually starts in your lower back and moves to your abdomen  · Effacement  occurs when your cervix softens and thins, so it can easily open for the baby  You will not be able to feel effacement  Your healthcare provider will examine your cervix for effacement  · Dilation  is widening of your cervix  Your healthcare provider will examine your cervix for dilation   Your cervix may start to dilate weeks before your baby is delivered  Your cervix will be fully opened and ready for delivery when it is dilated to 10 centimeters  · Increased discharge  from your vagina may occur  It may be brown, pink, clear, or slightly bloody  This discharge may also be called bloody show  Bloody show is a mucus plug that forms and blocks your cervix during pregnancy  The discharge may mean that your cervix is opening up and getting ready for delivery  · Rupture of membranes  is a sudden release of clear fluid from your vagina  Ruptured membranes means your water broke  Your healthcare provider may need to break your water if it does not happen on its own  False labor: You may have false labor signs, which are also called [place] contractions  False labor is common and may happen several weeks or days before your actual labor  The contractions are not regular, and do not get closer together  The pain is usually mild, does not worsen, and is felt only in front  [place] contractions may happen later in the day, and stop after you change position, walk, or rest   Follow up with your obstetrician or healthcare provider as directed:  Write down your questions so you remember to ask them during your visit  © Copyright [employer] 2021 Information is for End User's use only and may not be sold, redistributed or otherwise used for commercial purposes  All illustrations and images included in CareNotes® are the copyrighted property of [employer] , Inc  or Pat Villalobos   The above information is an  only  It is not intended as medical advice for individual conditions or treatments  Talk to your doctor, nurse or pharmacist before following any medical regimen to see if it is safe and effective for you  Having Your Baby: The Labor Process   AMBULATORY CARE:   The labor process  is a series of 3 stages that your body goes through to deliver your baby   It is not known for sure what causes labor to begin  Hormones made by you and your baby and changes in your uterus may help labor to start  Labor usually starts 2 weeks before or after your due date  Most women do not have their baby exactly on their due date  Call your obstetrician if:   · You have vaginal spotting or bleeding  · Your water broke or you feel warm water gushing or trickling from your vagina  · You have more than 5 contractions in 1 hour  · You have bloody mucus or show  · You notice any changes in your baby's movements  · You have abdominal cramps, pressure, or tightening  · You have a change in vaginal discharge  · You have questions or concerns about your condition or care  First stage of labor: The first stage of labor includes latent (early) labor and active labor  This stage may last up to 12 hours if this is your first pregnancy  It may last up to 10 hours if you delivered a baby before  Your uterus will contract to prepare your cervix for delivery and to push your baby out of the birth canal  Your cervix will dilate (widen) and efface (soften and become thinner)  Your contractions may last from 30 to 60 seconds  The contractions usually start in the back and move to the front  You may also have a pink, clear, or slightly bloody discharge called bloody show  Bloody show is caused by the movement of a mucus plug from your cervix  During pregnancy the mucus plug blocks your cervix to prevent it from opening  What to do during early labor:  Early labor may last for several hours  You will most likely be at home during early labor  Rest as much as possible while you are at home  Have someone rub your back  It may be helpful to place ice packs on your lower back  Go for a short walk if you are able  Drink water and suck on ice chips  Ask your healthcare provider if it is okay to eat during early labor  How to know when you are in active labor:   This stage may last up to 12 hours if this is your first pregnancy  It may last up to 10 hours if you delivered a baby before  Your contractions will get stronger, last longer, and happen more frequently  They will also become more intense and painful  Time your contractions from the beginning of one to the beginning of the next  Write this information down for 1 hour  Your healthcare provider will tell you when to go to the hospital or birthing center  This will be based on how many minutes apart your contractions are  Second stage of labor:  The second stage is the time between full cervix dilation and the birth of your baby  Your cervix will be completely dilated to 10 centimeters and your baby will be ready to be born  The second stage usually lasts 20 minutes to 2 hours  It may last up to 3 hours if this is your first baby  · You may be given antibiotics to fight a bacterial infection you have or prevent an infection during delivery  · Healthcare providers will help you find a position for giving birth that is comfortable for you  You can lie on your back, have your feet up in stirrups, or squat  · You may feel pressure on your rectum and the urge to push  This pressure is caused by the movement of your baby's head down the birth canal  Your healthcare provider will have you push when you feel the urge  He or she will guide your baby out of the birth canal  Forceps or suction may be used to help deliver your baby  You may also need an episiotomy (incision) to make the vaginal opening larger  This will make more room for your baby  Your perineum will be protected during delivery  This may be with a warm compress or massage of the area  · At least 1 minute after your baby is born, your healthcare provider will put clamps on the umbilical cord  The cord will then be cut  Your baby may be placed on your chest right away  He or she may also start breastfeeding  Third stage of labor:  The placenta (afterbirth) is delivered during this stage   After you give birth, your uterus will continue to contract to help push out the placenta  These contractions will begin 5 to 30 minutes after you give birth  Your healthcare provider will tell you when to push  You may have chills or shakiness during this stage  You may be given medicine to help prevent heavy bleeding that can happen during this stage  How to manage labor pain:  Pain can be managed naturally or with medicines  You can naturally manage pain by using relaxation methods and controlled breathing  There are different types of medicines that can be used to relieve pain while you are in labor  These medicines may be given through an IV or an epidural (thin catheter in your lower back)  Talk with your healthcare about your options for pain medicines if you choose to use them  Tell your provider if you prefer not to have any pain control medicines during labor  © Copyright [employer] 2021 Information is for End User's use only and may not be sold, redistributed or otherwise used for commercial purposes  All illustrations and images included in CareNotes® are the copyrighted property of A D A M , Inc  or [address] [place] [address]  The above information is an  only  It is not intended as medical advice for individual conditions or treatments  Talk to your doctor, nurse or pharmacist before following any medical regimen to see if it is safe and effective for you  COVID Vaccine information     [facility] encourages vaccination to prevent from developing a severe COVID virus infection and the resultant maternal and fetal complications that can arise with a severe infection  Receiving the vaccine supplies antibodies directly to your baby through the placenta and through breastfeeding  We discussed that SARS-CoV-2  (COVID) vaccination is an option for  eligible pregnant and lactating women        The following information is from [facility]:    The [place] and Moderna mRNA  vaccines have not been tested in pregnant   women and breastfeeding women yet  None of the vaccines have live virus   and do not contain ingredients that are known to be harmful to pregnant   women or to the fetus  Any risks to the fetus are thought to be low and   theoretical   Some risks of the vaccine including injection site   reactions, fatigue, headache, myalgias, chills, and fever; fever can be   treated with acetaminophen  Allergic reactions have been reported to be   rare  The [employer]) Adenovector  vaccine has been associated   with a rare complication in women at < 48years of age  Until more data is   available M is not recommending the use of this vaccine in pregnancy  ACOG and [facility] recommends that pregnant women have access to a vaccine and   that the vaccine not be withheld from pregnant or lactating women  For a COVID-19 Vaccine in Pregnancy Decision Aid, go to   [email or web address] org/[email or web address]/      The ABM (Academy of Breastfeeding Medicine) Statement on Considerations   for COVID-19 Vaccination in Lactation is available at:   http://[employer]/   -in-lactation  Finally, the CDC statement on Vaccination Consideration for People who are   Pregnant or Breastfeeding is available at:   [employer] com pt   y html  Diabetes and Pregnancy, Ambulatory Care   GENERAL INFORMATION:   What you need to know about diabetes and pregnancy:  Plan your pregnancy so healthcare providers can help you have a healthy pregnancy and baby  Decrease your risk of health problems by controlling your blood sugar levels before and during pregnancy  Seek immediate care for the following symptoms:   · Symptoms of hypoglycemia including being dizzy or shaking  You may sweat or have a headache      · Blood sugar level is over 200 mg/dL and you have stomach pain, nausea, vomiting, and confusion  Treatment for diabetes and pregnancy includes  getting prenatal care  Diabetes medicine or insulin may be needed to help control your blood sugar  Your healthcare provider may do a test called A1c at least every 3 months while you are pregnant  This blood test shows the average amount of sugar in your blood over the past 2 to 3 months  During labor and delivery, healthcare providers will check your blood sugar levels  They will give you insulin or glucose throughout your labor to keep your blood sugar at the right level  Manage diabetes and pregnancy:   · Eat a variety of healthy foods  The amount of calories you need depends on your weight before you got pregnant  Your healthcare provider or dietitian will tell you how many calories you need each day  You may need more calories while you are pregnant  You may need fewer calories if you are overweight  Your risk of problems such as high blood pressure and premature labor are higher if you are overweight  · Exercise  can help you keep your blood sugar level steady  Exercise can also help you lose weight if you are overweight  Ask your healthcare provider how much exercise you should get each day  Ask what types of physical activity you can do safely while you are pregnant  · Check your blood sugar level  Ask your healthcare providers when and how often you should check during the day  He will tell you what your blood sugar levels should be at different times throughout the day  He may want your blood sugar levels to be 60 mg/dL to 99 mg/dL before meals, at bedtime, and during the night  He may want them to be 100 mg/dL to 129 mg/dL after meals  Your healthcare provider can show you how to use a blood glucose meter to check your levels  · Take your diabetes medicine or insulin  as directed by your healthcare provider  If you have type 2 diabetes and you take diabetes pills, you may need to stop taking them and start using insulin   Insulin is safe to use during pregnancy  Certain medicines are not safe to use during pregnancy  Talk to your healthcare provider about all of the medicines you are currently taking  · Prevent hypoglycemia  Your risk of hypoglycemia (low blood sugar) is higher during pregnancy because you may not feel the symptoms  This risk is highest during the first trimester  Eat regular meals and snacks to avoid hypoglycemia  Always keep glucose tablets with you in case your blood sugar level gets low  If you do not have glucose tablets, drink milk, juice, or regular soda  Tell your family about the symptoms of hypoglycemia so they can help you if you cannot help yourself  Ask your healthcare provider how to manage hypoglycemia  · Prevent diabetic ketoacidosis (DKA)  DKA is a serious condition that can happen when your blood sugar level gets too high  Pregnancy increases your risk of DKA  The symptoms of DKA include stomach pain, nausea, vomiting, and confusion  Your healthcare provider may suggest that you test the levels of ketones in your urine when your blood sugar level is high  He may also ask you to check your ketones regularly if you are sick  · Do not drink alcohol  Alcohol is dangerous for your unborn baby  Alcohol can also increase your blood sugar levels and make your diabetes more difficult to manage  Ask your healthcare provider for information if you need help to quit drinking alcohol  · Do not smoke  If you smoke, it is never too late to quit  Smoking is harmful to your baby  Do not smoke around your baby, and do not let others smoke around him  Smoking can also worsen the problems that happen with diabetes  Ask your healthcare provider for information about quitting if you need help to stop smoking  Follow up with your healthcare provider as directed:  Write down your questions so you remember to ask them during your visits  CARE AGREEMENT:   You have the right to help plan your care   Learn about your health condition and how it may be treated  Discuss treatment options with your caregivers to decide what care you want to receive  You always have the right to refuse treatment  The above information is an  only  It is not intended as medical advice for individual conditions or treatments  Talk to your doctor, nurse or pharmacist before following any medical regimen to see if it is safe and effective for you  © 2014 [address] is for End User's use only and may not be sold, redistributed or otherwise used for commercial purposes  All illustrations and images included in CareNotes® are the copyrighted property of [employer]  or [employer]  Diabetes and Nutrition   WHAT YOU NEED TO KNOW:   Why are nutrition plans important? Nutrition plans help with healthy eating patterns that improve health  Nutrition plans and regular exercise help keep your blood sugar levels steady  They also help delay or prevent complications of diabetes, such as diabetic kidney disease  How do I create a nutrition plan? A dietitian will help you create a nutrition plan to meet your needs and your family's needs  The goal is for you to reach or maintain healthy weight, blood sugar, blood pressure, and lipid levels  You should meet with the dietitian at least 1 time each year  You will learn the following:  · How food affects your blood sugar levels    · How to create healthy eating habits    · How to make food choices based on your activity level, weight, and glucose levels    · How your favorite foods may fit into your plan    · Foods that contain carbohydrates (sugars and starches), including simple and complex carbohydrates    · How to keep track of all carbohydrates    · Correct portion sizes for each food    · Changes you can make to your plan if you get pregnant or are breastfeeding    What are some tips to do until I meet with the dietitian? · Do not skip meals  The goal is to keep your blood sugar level steady  Blood sugar levels may drop too low if you have received insulin and do not eat  · Eat more high-fiber foods, such as fresh or frozen fruits and vegetables, whole-grain breads, and beans  Fiber helps control or lower blood sugar and cholesterol levels  Choose whole fruits instead of fruit juice as much as possible  Sugar may be added to juice, and fiber may be removed  · Choose heart-healthy fats  Foods high in heart-healthy fats include olive oil, nuts, avocados, and fatty fish, such as salmon and tuna  Foods high in unhealthy fats include red meat, full-fat dairy products, and soft margarine  Unhealthy fats can increase your risk for heart disease, increase bad cholesterol, and lower good cholesterol  · Choose complex carbohydrates  Foods with complex carbohydrates include brown rice, whole-grain breads and cereals, and cooked beans  Foods with simple carbohydrates include white bread, white rice, most cold cereals, and snack foods  Your plan will include the amount of carbohydrate to have at one time or in a day  Your blood sugar level can get too high if you eat too much carbohydrate at one time  Blood sugar levels do not spike as high or drop as quickly with complex carbohydrates as with simple carbohydrates  Choose complex carbohydrates whenever possible  · Have less sodium (salt)  The risk for high blood pressure (BP) increases with high-sodium foods  Limit high-sodium foods, such as soy sauce, potato chips, and canned soup  Do not add salt to food you cook  Limit your use of table salt  Read labels to have no more than 2,300 milligrams of sodium in one day  · Limit artificial sweeteners  These may be found in food or drinks, such as diet soft drinks or other low-calorie beverages  Artificial sweeteners are low in calories  They may help you lower your overall calories and carbohydrates   It is important not to have more calories from other foods to make up for the calories saved  Artificial sweeteners do not have any nutrition  Eat whole foods and drink water as much as possible  Your plan may include beverages with artificial sweeteners for a short time  These can help you transition from high-sugar beverages to water  · Use the plate method for each meal   This method can help you eat the right amount of carbohydrates and keep your blood sugar levels under control  ? Draw an imaginary line down the middle of a 9-inch dinner plate  On one side, draw another line to divide that section in half  Your plate will have one large section and 2 small sections  ? Fill the largest section with non-starchy vegetables  These include broccoli, spinach, cucumbers, peppers, cauliflower, and tomatoes  ? Add a starch to one of the small sections  Starches include pasta, rice, whole-grain bread, tortillas, corn, potatoes, and beans  ? Add meat or another source of protein to the other small section  Examples include chicken or turkey without skin, fish, lean beef or pork, low-fat cheese, tofu, and eggs  ? Add dairy products or fruit next to your plate if your meal plan allows  Examples of dairy include skim or 1% milk and low-fat yogurt  If you do not drink milk or eat dairy products, you may be able to add another serving of starchy food instead  ? Have a low-calorie or calorie-free drink with your meal  Examples include water or unsweetened tea or coffee  What are the risks of alcohol? Alcohol can cause hypoglycemia (very low blood sugar level), especially if you use insulin  Alcohol can cause high blood sugar and BP levels, and weight gain if you drink too much  Women 21 years or older and men 72 years or older should limit alcohol to 1 drink a day  Men aged 24 to 59 years should limit alcohol to 2 drinks a day  A drink of alcohol is 12 ounces of beer, 5 ounces of wine, or 1½ ounces of liquor   Hypoglycemia can happen hours after you drink alcohol  Check your blood sugar level for several hours after you drink alcohol  Have a source of fast-acting carbohydrates with you in case your level goes too low  You need immediate care if you have signs or symptoms of hypoglycemia, such as sweating, confusion, or fainting  Why is it important to maintain a healthy weight? A healthy weight can help you control your diabetes  You can maintain a healthy weight with a nutrition plan and exercise  Ask your healthcare provider how much you should weigh  Ask him or her to help you create a weight loss plan if you are overweight  Together you can set weight loss and maintenance goals  Call your local emergency number [phone] in the [address] [facility]) if:   · You have any of the following signs of a heart attack:      ? Squeezing, pressure, or pain in your chest    ? You may  also have any of the following:     § Discomfort or pain in your back, neck, jaw, stomach, or arm    § Shortness of breath    § Nausea or vomiting    § Lightheadedness or a sudden cold sweat      When should I seek immediate care? · You have a low blood sugar level and it does not improve with treatment  Symptoms are trouble thinking, a pounding heartbeat, and sweating  · Your blood sugar level is above 240 mg/dL and does not come down within 15 minutes of treatment  · You have ketones in your blood or urine  · You have nausea or are vomiting and cannot keep any food or liquid down  · You have blurred or double vision  · Your breath has a fruity, sweet smell, or your breathing is shallow  When should I call my doctor or diabetes care team?   · Your blood sugar levels are higher than your target goals  · You often have low blood sugar levels  · You have trouble coping with diabetes, or you feel anxious or depressed  · You have questions or concerns about your condition or care  CARE AGREEMENT:   You have the right to help plan your care   Learn about your health condition and how it may be treated  Discuss treatment options with your healthcare providers to decide what care you want to receive  You always have the right to refuse treatment  The above information is an  only  It is not intended as medical advice for individual conditions or treatments  Talk to your doctor, nurse or pharmacist before following any medical regimen to see if it is safe and effective for you  © Copyright [employer] 2021 Information is for End User's use only and may not be sold, redistributed or otherwise used for commercial purposes   All illustrations and images included in CareNotes® are the copyrighted property of [employer]  or [address] [place] [address]

## 2022-01-01 NOTE — ASSESSMENT & PLAN NOTE
Ellie Stafford is a 31 y o    30w5d who presents for routine PNV  28 week labs reviewed:   TWG 10 9 kg (24 lb)   Denies OB complaints  Good fetal movement  Denies contractions, cramping, leakage of fluid or vaginal bleeding  Reviewed  labor precautions and FKCs     Advised to start Perineal massage at 34-36 weeks   Pregnancy Essential guide and Baby and Me web site recommended

## 2022-01-04 ENCOUNTER — ROUTINE PRENATAL (OUTPATIENT)
Dept: OBGYN CLINIC | Facility: CLINIC | Age: 27
End: 2022-01-04

## 2022-01-04 VITALS
SYSTOLIC BLOOD PRESSURE: 110 MMHG | WEIGHT: 141 LBS | HEIGHT: 60 IN | BODY MASS INDEX: 27.68 KG/M2 | DIASTOLIC BLOOD PRESSURE: 68 MMHG

## 2022-01-04 DIAGNOSIS — Z34.03 ENCOUNTER FOR SUPERVISION OF NORMAL FIRST PREGNANCY IN THIRD TRIMESTER: Primary | ICD-10-CM

## 2022-01-04 LAB
SL AMB  POCT GLUCOSE, UA: NEGATIVE
SL AMB POCT URINE PROTEIN: NEGATIVE

## 2022-01-04 PROCEDURE — PNV: Performed by: OBSTETRICS & GYNECOLOGY

## 2022-01-04 NOTE — PROGRESS NOTES
Problem   Encounter for Supervision of Normal First Pregnancy in Second Trimester    + GBS in urine   Prenatal labs WNL  28 week labs, HGB 12 2 , abnormal 1 hr @ 141 / needs 3hr going this weekend for 3 hr  Declines Tdap, flu, &  COVID vaccines   28 week folder given at 30 5 week visit       Encounter for supervision of normal first pregnancy in second trimester  Chaparro Lr is a 31 y o    30w5d who presents for routine PNV  28 week labs reviewed:   TWG 10 9 kg (24 lb)   Denies OB complaints  Good fetal movement  Denies contractions, cramping, leakage of fluid or vaginal bleeding  Reviewed  labor precautions and FKCs     Advised to start Perineal massage at 34-36 weeks   Pregnancy Essential guide and Baby and Me web site recommended

## 2022-01-04 NOTE — PROGRESS NOTES
Patient is here for her routine prenatal visit she is 30 weeks 5 days with no concerns ,  Patient reports positive fetal movements     Patient received her 3 hour glucose lab script since she failed her 1 hour glucose test

## 2022-01-08 PROBLEM — Z34.03 ENCOUNTER FOR SUPERVISION OF NORMAL FIRST PREGNANCY IN THIRD TRIMESTER: Status: ACTIVE | Noted: 2021-10-25

## 2022-01-18 ENCOUNTER — ULTRASOUND (OUTPATIENT)
Dept: PERINATAL CARE | Facility: OTHER | Age: 27
End: 2022-01-18
Payer: COMMERCIAL

## 2022-01-18 VITALS
WEIGHT: 143.2 LBS | DIASTOLIC BLOOD PRESSURE: 64 MMHG | HEART RATE: 105 BPM | BODY MASS INDEX: 28.11 KG/M2 | HEIGHT: 60 IN | SYSTOLIC BLOOD PRESSURE: 108 MMHG

## 2022-01-18 DIAGNOSIS — Q24.9 MATERNAL CONGENITAL CARDIAC ANOMALY, ANTEPARTUM: Primary | ICD-10-CM

## 2022-01-18 DIAGNOSIS — O99.419 MATERNAL CONGENITAL CARDIAC ANOMALY, ANTEPARTUM: Primary | ICD-10-CM

## 2022-01-18 DIAGNOSIS — Z3A.32 32 WEEKS GESTATION OF PREGNANCY: ICD-10-CM

## 2022-01-18 DIAGNOSIS — Z36.89 ENCOUNTER TO ESTABLISH GESTATIONAL AGE USING ULTRASOUND: ICD-10-CM

## 2022-01-18 PROCEDURE — 76816 OB US FOLLOW-UP PER FETUS: CPT | Performed by: OBSTETRICS & GYNECOLOGY

## 2022-01-18 PROCEDURE — 99212 OFFICE O/P EST SF 10 MIN: CPT | Performed by: OBSTETRICS & GYNECOLOGY

## 2022-01-18 NOTE — LETTER
January 18, 2022     [address]  [address] [address]    Patient: Roz Zimmerman   YOB: 1995   Date of Visit: 1/18/2022       Dear Dr Gilberto Leyden: Thank you for referring Roz Zimmerman to me for evaluation  Below are my notes for this consultation  If you have questions, please do not hesitate to call me  I look forward to following your patient along with you  Sincerely,        Janelle Aaron MD        CC: No Recipients  Janelle Aaron MD  1/18/2022 11:32 AM  Sign when Signing Visit  Please refer to the [facility] ultrasound report in Ob Procedures for additional information regarding today's visit

## 2022-01-18 NOTE — PATIENT INSTRUCTIONS
Kick Counts in Pregnancy   WHAT YOU NEED TO KNOW:   Kick counts measure how much your baby is moving in your womb  A kick from your baby can be felt as a twist, turn, swish, roll, or jab  It is common to feel your baby kicking at 26 to 28 weeks of pregnancy  You may feel your baby kick as early as 20 weeks of pregnancy  You may want to start counting at 28 weeks  DISCHARGE INSTRUCTIONS:   Contact your doctor immediately if:   · You feel a change in the number of kicks or movements of your baby  · You feel fewer than 10 kicks within 2 hours  · You have questions or concerns about your baby's movements  Why measure kick counts:  Your baby's movement may provide information about your baby's health  He or she may move less, or not at all, if there are problems  Your baby may move less if he or she is not getting enough oxygen or nutrition from the placenta  Do not smoke while you are pregnant  Smoking decreases the amount of oxygen that gets to your baby  Talk to your healthcare provider if you need help to quit smoking  Tell your healthcare provider as soon as you feel a change in your baby's movements  When to measure kick counts:   · Measure kick counts at the same time every day  · Measure kick counts when your baby is awake and most active  Your baby may be most active in the evening  How to measure kick counts:  Check that your baby is awake before you measure kick counts  You can wake up your baby by lightly pushing on your belly, walking, or drinking something cold  Your healthcare provider may tell you different ways to measure kick counts  You may be told to do the following:  · Use a chart or clock to keep track of the time you start and finish counting  · Sit in a chair or lie on your left side  · Place your hands on the largest part of your belly  · Count until you reach 10 kicks  Write down how much time it takes to count 10 kicks       · It may take 30 minutes to 2 hours to count 10 kicks  It should not take more than 2 hours to count 10 kicks  Follow up with your doctor as directed:  Write down your questions so you remember to ask them during your visits  © Copyright [employer] 2021 Information is for End User's use only and may not be sold, redistributed or otherwise used for commercial purposes  All illustrations and images included in CareNotes® are the copyrighted property of [employer]  or [facility] Tanvi Villalobos   The above information is an  only  It is not intended as medical advice for individual conditions or treatments  Talk to your doctor, nurse or pharmacist before following any medical regimen to see if it is safe and effective for you

## 2022-01-20 ENCOUNTER — APPOINTMENT (OUTPATIENT)
Dept: LAB | Facility: HOSPITAL | Age: 27
End: 2022-01-20
Payer: COMMERCIAL

## 2022-01-20 DIAGNOSIS — Z34.03 ENCOUNTER FOR SUPERVISION OF NORMAL FIRST PREGNANCY IN THIRD TRIMESTER: ICD-10-CM

## 2022-01-20 LAB
GLUCOSE 1H P 100 G GLC PO SERPL-MCNC: 101 MG/DL (ref 65–179)
GLUCOSE 2H P 100 G GLC PO SERPL-MCNC: 92 MG/DL (ref 65–154)
GLUCOSE 3H P 100 G GLC PO SERPL-MCNC: 87 MG/DL (ref 65–139)
GLUCOSE P FAST SERPL-MCNC: 82 MG/DL (ref 65–99)

## 2022-01-20 PROCEDURE — 82951 GLUCOSE TOLERANCE TEST (GTT): CPT

## 2022-01-20 PROCEDURE — 82952 GTT-ADDED SAMPLES: CPT

## 2022-01-20 PROCEDURE — 36415 COLL VENOUS BLD VENIPUNCTURE: CPT

## 2022-01-22 NOTE — ASSESSMENT & PLAN NOTE
Tereza Oglesby is a 31 y o    33w5d who presents for routine PNV  28 week labs reviewed:   TWG 13 2 kg (29 lb) advised to make good food choices   Denies OB complaints  Good fetal movement  Denies contractions, cramping, leakage of fluid or vaginal bleeding  Declines all  vaccine  Reviewed  labor precautions and FKCs     Advised to start Perineal massage at 34-36 weeks   Pregnancy Essential guide and Baby and Me web site recommended

## 2022-01-22 NOTE — PATIENT INSTRUCTIONS
Pregnancy at 28 to 38 Weeks   AMBULATORY CARE:   Changes happening with your body: You are considered full term at the beginning of 37 weeks  Your breathing may be easier if your baby has moved down into a head-down position  You may need to urinate more often because the baby may be pressing on your bladder  You may also feel more discomfort and get tired easily  Seek care immediately if:   · You develop a severe headache that does not go away  · You have new or increased vision changes, such as blurred or spotted vision  · You have new or increased swelling in your face or hands  · You have vaginal spotting or bleeding  · Your water broke or you feel warm water gushing or trickling from your vagina  Call your obstetrician if:   · You have more than 5 contractions in 1 hour  · You notice any changes in your baby's movements  · You have abdominal cramps, pressure, or tightening  · You have a change in vaginal discharge  · You have chills or a fever  · You have vaginal itching, burning, or pain  · You have yellow, green, white, or foul-smelling vaginal discharge  · You have pain or burning when you urinate, less urine than usual, or pink or bloody urine  · You have questions or concerns about your condition or care  How to care for yourself at this stage of your pregnancy:       · Eat a variety of healthy foods  Healthy foods include fruits, vegetables, whole-grain breads, low-fat dairy foods, beans, lean meats, and fish  Drink liquids as directed  Ask how much liquid to drink each day and which liquids are best for you  Limit caffeine to less than 200 milligrams each day  Limit your intake of fish to 2 servings each week  Choose fish low in mercury such as canned light tuna, shrimp, salmon, cod, or tilapia  Do not  eat fish high in mercury such as swordfish, tilefish, jorje mackerel, and shark  · Take prenatal vitamins as directed    Your need for certain vitamins and minerals, such as folic acid, increases during pregnancy  Prenatal vitamins provide some of the extra vitamins and minerals you need  Prenatal vitamins may also help to decrease the risk of certain birth defects  · Rest as needed  Put your feet up if you have swelling in your ankles and feet  · Talk to your healthcare provider about exercise  Moderate exercise can help you stay fit  Your healthcare provider will help you plan an exercise program that is safe for you during pregnancy  · Do not smoke  Smoking increases your risk of a miscarriage and other health problems during your pregnancy  Smoking can cause your baby to be born early or weigh less at birth  Ask your healthcare provider for information if you need help quitting  · Do not drink alcohol  Alcohol passes from your body to your baby through the placenta  It can affect your baby's brain development and cause fetal alcohol syndrome (FAS)  FAS is a group of conditions that causes mental, behavior, and growth problems  · Talk to your healthcare provider before you take any medicines  Many medicines may harm your baby if you take them when you are pregnant  Do not take any medicines, vitamins, herbs, or supplements without first talking to your healthcare provider  Never use illegal or street drugs (such as marijuana or cocaine) while you are pregnant  Safety tips during pregnancy:   · Avoid hot tubs and saunas  Do not use a hot tub or sauna while you are pregnant, especially during your first trimester  Hot tubs and saunas may raise your baby's temperature and increase the risk of birth defects  · Avoid toxoplasmosis  This is an infection caused by eating raw meat or being around infected cat feces  It can cause birth defects, miscarriages, and other problems  Wash your hands after you touch raw meat  Make sure any meat is well-cooked before you eat it  Avoid raw eggs and unpasteurized milk   Use gloves or ask someone else to clean your cat's litter box while you are pregnant  · Ask your healthcare provider about travel  The most comfortable time to travel is during the second trimester  Ask your provider if you can travel after 36 weeks  You may not be able to travel in an airplane after 36 weeks  He or she may also recommend you avoid long road trips  Changes happening with your baby:  By 38 weeks, your baby may weigh between 6 and 9 pounds  Your baby may be about 14 inches long from the top of the head to the rump (baby's bottom)  Your baby hears well enough to know your voice  As your baby gets larger, you may feel fewer kicks and more stretching and rolling  Your baby may move into a head-down position  Your baby will also rest lower in your abdomen  What you need to know about prenatal care: Your healthcare provider will check your blood pressure and weight  You may also need the following:  · A urine test  may also be done to check for sugar and protein  These can be signs of gestational diabetes or infection  Protein in your urine may also be a sign of preeclampsia  Preeclampsia is a condition that can develop during week 20 or later of your pregnancy  It causes high blood pressure, and it can cause problems with your kidneys and other organs  · A blood test  may be done to check for anemia (low iron level)  · A Tdap vaccine  may be recommended by your healthcare provider  · A group B strep test  is a test that is done to check for group B strep infection  Group B strep is a type of bacteria that may be found in the vagina or rectum  It can be passed to your baby during delivery if you have it  Your healthcare provider will take swab your vagina or rectum and send the sample to the lab for tests  · Fundal height  is a measurement of your uterus to check your baby's growth  This number is usually the same as the number of weeks that you have been pregnant   Your healthcare provider may also check your baby's position  · Your baby's heart rate  will be checked  Follow up with your obstetrician as directed:  Write down your questions so you remember to ask them during your visits  © Copyright [employer] 2021 Information is for End User's use only and may not be sold, redistributed or otherwise used for commercial purposes  All illustrations and images included in CareNotes® are the copyrighted property of A D A M , Inc  or Pat Villalobos   The above information is an  only  It is not intended as medical advice for individual conditions or treatments  Talk to your doctor, nurse or pharmacist before following any medical regimen to see if it is safe and effective for you  Kick Counts in Pregnancy   AMBULATORY CARE:   Kick counts  measure how much your baby is moving in your womb  A kick from your baby can be felt as a twist, turn, swish, roll, or jab  It is common to feel your baby kicking at 26 to 28 weeks of pregnancy  You may feel your baby kick as early as 20 weeks of pregnancy  You may want to start counting at 28 weeks  Contact your doctor immediately if:   · You feel a change in the number of kicks or movements of your baby  · You feel fewer than 10 kicks within 2 hours  · You have questions or concerns about your baby's movements  Why measure kick counts:  Your baby's movement may provide information about your baby's health  He or she may move less, or not at all, if there are problems  Your baby may move less if he or she is not getting enough oxygen or nutrition from the placenta  Do not smoke while you are pregnant  Smoking decreases the amount of oxygen that gets to your baby  Talk to your healthcare provider if you need help to quit smoking  Tell your healthcare provider as soon as you feel a change in your baby's movements  When to measure kick counts:   · Measure kick counts at the same time every day        · Measure kick counts when your baby is awake and most active  Your baby may be most active in the evening  How to measure kick counts:  Check that your baby is awake before you measure kick counts  You can wake up your baby by lightly pushing on your belly, walking, or drinking something cold  Your healthcare provider may tell you different ways to measure kick counts  You may be told to do the following:  · Use a chart or clock to keep track of the time you start and finish counting  · Sit in a chair or lie on your left side  · Place your hands on the largest part of your belly  · Count until you reach 10 kicks  Write down how much time it takes to count 10 kicks  · It may take 30 minutes to 2 hours to count 10 kicks  It should not take more than 2 hours to count 10 kicks  Follow up with your doctor as directed:  Write down your questions so you remember to ask them during your visits  © Copyright [employer] 2021 Information is for End User's use only and may not be sold, redistributed or otherwise used for commercial purposes  All illustrations and images included in CareNotes® are the copyrighted property of A D A M , Inc  or [employer]   The above information is an  only  It is not intended as medical advice for individual conditions or treatments  Talk to your doctor, nurse or pharmacist before following any medical regimen to see if it is safe and effective for you  Perineal Massage    Perineal massage is recommended starting after 34 weeks in order to reduce risks of perineal tearing during childbirth  You have been provided and instructional sheet in your yellow 28 week prenatal packet  Early Labor Signs   AMBULATORY CARE:   Early labor signs and symptoms  are the changes in your body that signal your baby is getting ready to be delivered  Early labor signs can happen weeks, days or hours before delivery    Call 911 for any of the following:   · You have heavy vaginal bleeding  · You cannot get to the hospital before the baby starts to come out  Seek care immediately if:   · You have regular, painful contractions that are less than 5 minutes apart and last 30 to 70 seconds each  · You have a constant trickle or sudden gush of clear fluid from your vagina  · You notice a sudden decrease in your baby's movement  Contact your obstetrician or healthcare provider if:   · You have pain in your lower back or abdomen that does not get better when you change positions  · You have bloody mucus or show  · You have questions or concerns about your condition or care  Early labor signs and symptoms:   · Lightening  occurs when your baby drops inside your pelvis  You may feel increased pressure in your pelvis  This may happen a few weeks to a few hours before your labor begins  · Contractions  are cramps and tightening that occur in your uterus to help move the baby through your birth canal  Contractions occur regularly and more often each time  Each one lasts about 30 to 70 seconds, and gets stronger until you deliver your baby  Contractions do not go away with movement  The pain usually starts in your lower back and moves to your abdomen  · Effacement  occurs when your cervix softens and thins, so it can easily open for the baby  You will not be able to feel effacement  Your healthcare provider will examine your cervix for effacement  · Dilation  is widening of your cervix  Your healthcare provider will examine your cervix for dilation  Your cervix may start to dilate weeks before your baby is delivered  Your cervix will be fully opened and ready for delivery when it is dilated to 10 centimeters  · Increased discharge  from your vagina may occur  It may be brown, pink, clear, or slightly bloody  This discharge may also be called bloody show  Bloody show is a mucus plug that forms and blocks your cervix during pregnancy   The discharge may mean that your cervix is opening up and getting ready for delivery  · Rupture of membranes  is a sudden release of clear fluid from your vagina  Ruptured membranes means your water broke  Your healthcare provider may need to break your water if it does not happen on its own  False labor: You may have false labor signs, which are also called [place] contractions  False labor is common and may happen several weeks or days before your actual labor  The contractions are not regular, and do not get closer together  The pain is usually mild, does not worsen, and is felt only in front  [place] Oliver contractions may happen later in the day, and stop after you change position, walk, or rest   Follow up with your obstetrician or healthcare provider as directed:  Write down your questions so you remember to ask them during your visit  © Copyright [employer] 2021 Information is for End User's use only and may not be sold, redistributed or otherwise used for commercial purposes  All illustrations and images included in CareNotes® are the copyrighted property of A D A M , Inc  or [employer]   The above information is an  only  It is not intended as medical advice for individual conditions or treatments  Talk to your doctor, nurse or pharmacist before following any medical regimen to see if it is safe and effective for you  Having Your Baby: The Labor Process   AMBULATORY CARE:   The labor process  is a series of 3 stages that your body goes through to deliver your baby  It is not known for sure what causes labor to begin  Hormones made by you and your baby and changes in your uterus may help labor to start  Labor usually starts 2 weeks before or after your due date  Most women do not have their baby exactly on their due date  Call your obstetrician if:   · You have vaginal spotting or bleeding  · Your water broke or you feel warm water gushing or trickling from your vagina      · You have more than 5 contractions in 1 hour  · You have bloody mucus or show  · You notice any changes in your baby's movements  · You have abdominal cramps, pressure, or tightening  · You have a change in vaginal discharge  · You have questions or concerns about your condition or care  First stage of labor: The first stage of labor includes latent (early) labor and active labor  This stage may last up to 12 hours if this is your first pregnancy  It may last up to 10 hours if you delivered a baby before  Your uterus will contract to prepare your cervix for delivery and to push your baby out of the birth canal  Your cervix will dilate (widen) and efface (soften and become thinner)  Your contractions may last from 30 to 60 seconds  The contractions usually start in the back and move to the front  You may also have a pink, clear, or slightly bloody discharge called bloody show  Bloody show is caused by the movement of a mucus plug from your cervix  During pregnancy the mucus plug blocks your cervix to prevent it from opening  What to do during early labor:  Early labor may last for several hours  You will most likely be at home during early labor  Rest as much as possible while you are at home  Have someone rub your back  It may be helpful to place ice packs on your lower back  Go for a short walk if you are able  Drink water and suck on ice chips  Ask your healthcare provider if it is okay to eat during early labor  How to know when you are in active labor: This stage may last up to 12 hours if this is your first pregnancy  It may last up to 10 hours if you delivered a baby before  Your contractions will get stronger, last longer, and happen more frequently  They will also become more intense and painful  Time your contractions from the beginning of one to the beginning of the next  Write this information down for 1 hour  Your healthcare provider will tell you when to go to the hospital or birthing center   This will be based on how many minutes apart your contractions are  Second stage of labor:  The second stage is the time between full cervix dilation and the birth of your baby  Your cervix will be completely dilated to 10 centimeters and your baby will be ready to be born  The second stage usually lasts 20 minutes to 2 hours  It may last up to 3 hours if this is your first baby  · You may be given antibiotics to fight a bacterial infection you have or prevent an infection during delivery  · Healthcare providers will help you find a position for giving birth that is comfortable for you  You can lie on your back, have your feet up in stirrups, or squat  · You may feel pressure on your rectum and the urge to push  This pressure is caused by the movement of your baby's head down the birth canal  Your healthcare provider will have you push when you feel the urge  He or she will guide your baby out of the birth canal  Forceps or suction may be used to help deliver your baby  You may also need an episiotomy (incision) to make the vaginal opening larger  This will make more room for your baby  Your perineum will be protected during delivery  This may be with a warm compress or massage of the area  · At least 1 minute after your baby is born, your healthcare provider will put clamps on the umbilical cord  The cord will then be cut  Your baby may be placed on your chest right away  He or she may also start breastfeeding  Third stage of labor:  The placenta (afterbirth) is delivered during this stage  After you give birth, your uterus will continue to contract to help push out the placenta  These contractions will begin 5 to 30 minutes after you give birth  Your healthcare provider will tell you when to push  You may have chills or shakiness during this stage  You may be given medicine to help prevent heavy bleeding that can happen during this stage    How to manage labor pain:  Pain can be managed naturally or with medicines  You can naturally manage pain by using relaxation methods and controlled breathing  There are different types of medicines that can be used to relieve pain while you are in labor  These medicines may be given through an IV or an epidural (thin catheter in your lower back)  Talk with your healthcare about your options for pain medicines if you choose to use them  Tell your provider if you prefer not to have any pain control medicines during labor  © Copyright [employer] 2021 Information is for End User's use only and may not be sold, redistributed or otherwise used for commercial purposes  All illustrations and images included in CareNotes® are the copyrighted property of A D A M , Inc  or [facility] [employer]   The above information is an  only  It is not intended as medical advice for individual conditions or treatments  Talk to your doctor, nurse or pharmacist before following any medical regimen to see if it is safe and effective for you  COVID Vaccine information     [facility] encourages vaccination to prevent from developing a severe COVID virus infection and the resultant maternal and fetal complications that can arise with a severe infection  Receiving the vaccine supplies antibodies directly to your baby through the placenta and through breastfeeding  We discussed that SARS-CoV-2  (COVID) vaccination is an option for  eligible pregnant and lactating women  The following information is from [facility]:    The [place] and Moderna mRNA  vaccines have not been tested in pregnant   women and breastfeeding women yet  None of the vaccines have live virus   and do not contain ingredients that are known to be harmful to pregnant   women or to the fetus  Any risks to the fetus are thought to be low and   theoretical   Some risks of the vaccine including injection site   reactions, fatigue, headache, myalgias, chills, and fever; fever can be   treated with acetaminophen  Allergic reactions have been reported to be   rare  The [employer] [address] Adenovector  vaccine has been associated   with a rare complication in women at < 48years of age  Until more data is   available M is not recommending the use of this vaccine in pregnancy  ACOG and [facility] recommends that pregnant women have access to a vaccine and   that the vaccine not be withheld from pregnant or lactating women  For a COVID-19 Vaccine in Pregnancy Decision Aid, go to   https://NetMinder org/COVIDvacPregnancy/      The ABM (Academy of Breastfeeding Medicine) Statement on Considerations   for COVID-19 Vaccination in Lactation is available at:   http://garo-fortune com/   -in-lactation  Finally, the [employer] statement on Vaccination Consideration for People who are   Pregnant or Breastfeeding is available at:   InstantTyping com pt   y html  Valuable Online Resource:    [facility] pregnancy essential guide    [email or web address] [email or web address] chahal/      On the right side of the screen is a 50 page guide providing valuable information about your entire pregnancy  On the left hand side of the site you will see several other links to great information and resources that [facility] offers     If you click on the tab that says "Pregnancy and Birth Packet" this opens another  guide to labor and delivery information as well as breast feeding information,  care, pediatricians, car seat safety and much more     The [facility] and [address] tab has a virtual tour of the new L&D unit, as well as valuable information about classes that are offered, breast feeding support, support groups and much more       I highly recommend the virtual Breast Feeding class, very informational even if you have breast fed in the past  Check for available dates !    Click around and enjoy all we have to offer! Please note that all information in regards to locations and visiting hours have not been updated due to 2 Nydia Craig delivery location is [address] [place] [address],[address] [facility]   AMBULATORY CARE:   Jeffy Gonzalez contractions  are tightening and squeezing of the muscles of your uterus (womb) during pregnancy  The uterine muscles control the uterus  [place] contractions stop on their own  They are not true labor contractions and do not cause your cervix (opening to your uterus) to dilate (open)  Common symptoms include the following:   · Pain or discomfort in your groin or lower abdomen that comes and goes    · Your contractions are short, and do not last longer each time they happen    · Your contractions do not get closer together each time    · Your contractions do not get stronger or more painful each time    · Your contractions stop when you change your position or rest    Seek care immediately if:   · You have bleeding from your vagina  · You have fluid leaking from your vagina that does not stop  · You feel a gush of fluid from your vagina  · Your contractions happen every 5 minutes or sooner, and last for more than 60 seconds  · Your contractions begin to feel stronger or more painful  · You feel a change in your baby's movement, or you feel fewer than 6 to 10 movements in an hour  Call your doctor or obstetrician if:   · You have a fever  · You have questions or concerns about your condition or care  Treatment for [place] contractions  may include pain medicine to relieve discomfort or pain or sedatives to relax the muscles of your uterus  If you are dehydrated, he or she may give you fluids through an IV or tell you to drink liquids  Self-care:   · Change your activity or your position  when you feel contractions begin   Walk if you have been lying or sitting  Lie down if you have been standing or walking  True labor will not stop by changing your position or activity  · Take a warm bath  to relax your body  · Drink more liquids  to prevent dehydration  Ask how much liquid to drink each day and which liquids are best for you  · Practice your labor breathing  to decrease your discomfort  This may help you get ready for true labor  Take slow, deep breaths, or fast, short breaths  Ask your healthcare provider how to practice labor breathing  Follow up with your doctor or obstetrician as directed:  Write down your questions so you remember to ask them during your visits  © Copyright [employer] 2021 Information is for End User's use only and may not be sold, redistributed or otherwise used for commercial purposes  All illustrations and images included in CareNotes® are the copyrighted property of A D A M , Inc  or [facility] Tanvi Villalobos   The above information is an  only  It is not intended as medical advice for individual conditions or treatments  Talk to your doctor, nurse or pharmacist before following any medical regimen to see if it is safe and effective for you

## 2022-01-25 ENCOUNTER — ROUTINE PRENATAL (OUTPATIENT)
Dept: OBGYN CLINIC | Facility: CLINIC | Age: 27
End: 2022-01-25

## 2022-01-25 VITALS
BODY MASS INDEX: 28.66 KG/M2 | WEIGHT: 146 LBS | DIASTOLIC BLOOD PRESSURE: 68 MMHG | SYSTOLIC BLOOD PRESSURE: 120 MMHG | HEIGHT: 60 IN

## 2022-01-25 DIAGNOSIS — Z34.03 ENCOUNTER FOR SUPERVISION OF NORMAL FIRST PREGNANCY IN THIRD TRIMESTER: Primary | ICD-10-CM

## 2022-01-25 LAB
SL AMB  POCT GLUCOSE, UA: NEGATIVE
SL AMB POCT URINE PROTEIN: NEGATIVE

## 2022-01-25 PROCEDURE — PNV: Performed by: OBSTETRICS & GYNECOLOGY

## 2022-01-25 NOTE — PROGRESS NOTES
Problem   Encounter for Supervision of Normal First Pregnancy in Third Trimester    + GBS in urine   Prenatal labs WNL  28 week labs, HGB 12 2 , abnormal 1 hr @ 141 / needs 3hr going this weekend for 3 hr  Declines Tdap, flu, &  COVID vaccines   28 week folder given at 30 5 week visit    Needs delivery consent signed, has appt with Dr Marcial Jalloh     Needs BP & L&D forms returned        Encounter for supervision of normal first pregnancy in third trimester  Adilene Trejo is a 31 y o    33w5d who presents for routine PNV  28 week labs reviewed:   TWG 13 2 kg (29 lb) advised to make good food choices   Denies OB complaints  Good fetal movement  Denies contractions, cramping, leakage of fluid or vaginal bleeding  Declines all  vaccine  Reviewed  labor precautions and FKCs     Advised to start Perineal massage at 34-36 weeks   Pregnancy Essential guide and Baby and Me web site recommended

## 2022-01-25 NOTE — PROGRESS NOTES
Patient is here for her routine prenatal visit she is 33 weeks 5 days with no concerns   Patient reports positive fetal movements No lost of fluids and No contractions   Patient is planning to breast feed the baby

## 2022-01-31 ENCOUNTER — TELEPHONE (OUTPATIENT)
Dept: OBGYN CLINIC | Facility: CLINIC | Age: 27
End: 2022-01-31

## 2022-01-31 NOTE — TELEPHONE ENCOUNTER
Pt lm on nurses line that she is 34 wks  Mother in law has shingles and her baby shower is Sat and wants to know if ok to be around her  Tried to call her back but call did not go through

## 2022-02-04 ENCOUNTER — TELEPHONE (OUTPATIENT)
Dept: OBGYN CLINIC | Facility: CLINIC | Age: 27
End: 2022-02-04

## 2022-02-04 NOTE — TELEPHONE ENCOUNTER
----- Message from Zoya Baig sent at 2/3/2022  9:43 PM EST -----  Regarding: question  Hi! Sorry I tried calling back today but couldn't get through  Was just wondering if it was okay to be near someone with Shingles; I've never had the chicken pox however  So I'm assuming it's okay but just wanted to double check

## 2022-02-04 NOTE — TELEPHONE ENCOUNTER
Varicella is the virus that causes both shingles and chicken pox  While it is less contagious in a patient with shingles, if the rash is unable to be fully covered with an occlusive dressing (such as tegederm or something like it), this highly increases the risk of transmission  Patients are infectious from 1-2 days prior to rash until lesions are crusted over  Disease severity in pregnant adults is increased with risk varicella pneumonia, which would be a medical emergency  Transmission of varicella from mother to infant can occur in utero, perinatally, or postnatally  Congenital abnormalities can result from maternal infection  If she does contract varicella she should contact us immediately for treatment

## 2022-02-08 ENCOUNTER — ROUTINE PRENATAL (OUTPATIENT)
Dept: OBGYN CLINIC | Facility: CLINIC | Age: 27
End: 2022-02-08

## 2022-02-08 VITALS
BODY MASS INDEX: 29.25 KG/M2 | SYSTOLIC BLOOD PRESSURE: 102 MMHG | DIASTOLIC BLOOD PRESSURE: 70 MMHG | HEIGHT: 60 IN | WEIGHT: 149 LBS

## 2022-02-08 DIAGNOSIS — E74.39 GLUCOSE INTOLERANCE: ICD-10-CM

## 2022-02-08 DIAGNOSIS — Z34.03 ENCOUNTER FOR SUPERVISION OF NORMAL FIRST PREGNANCY IN THIRD TRIMESTER: Primary | ICD-10-CM

## 2022-02-08 DIAGNOSIS — R82.71 GBS BACTERIURIA: ICD-10-CM

## 2022-02-08 DIAGNOSIS — Z87.74 HISTORY OF CONGENITAL HEART DEFECT: ICD-10-CM

## 2022-02-08 PROBLEM — O99.419 MATERNAL CONGENITAL CARDIAC ANOMALY, ANTEPARTUM: Status: RESOLVED | Noted: 2022-01-18 | Resolved: 2022-02-08

## 2022-02-08 PROBLEM — Q24.9 MATERNAL CONGENITAL CARDIAC ANOMALY, ANTEPARTUM: Status: RESOLVED | Noted: 2022-01-18 | Resolved: 2022-02-08

## 2022-02-08 LAB
SL AMB  POCT GLUCOSE, UA: NEGATIVE
SL AMB POCT URINE PROTEIN: NEGATIVE

## 2022-02-08 PROCEDURE — PNV: Performed by: STUDENT IN AN ORGANIZED HEALTH CARE EDUCATION/TRAINING PROGRAM

## 2022-02-08 NOTE — ASSESSMENT & PLAN NOTE
30 yo  at 35+5 here for routine ob visit  Feeling well  No contractions, leaking or bleeding  Good fetal movement  Has previously declined all vaccines  TWG 32# goal 25-35#  Encouraged continued activity

## 2022-02-08 NOTE — PROGRESS NOTES
Patient presenting for routine prenatal visit  Patient is 35W5D, MELODIE 03/10/2022  Patient having good fetal movement, denies any bleeding, cramping or LOF  Offered both flu and tdap vaccines, patient declined  GBS positive  Delivery consent to be signed today     Urine neg/neg

## 2022-02-08 NOTE — PROGRESS NOTES
History of congenital heart defect  Corrected PDA  S/p cardiology consult with Dr Brenda Jurado and normal echo  No further follow up needed  GBS bacteriuria  Will need antibiotics in labor  Encounter for supervision of normal first pregnancy in third trimester  32 yo  at 35+5 here for routine ob visit  Feeling well  No contractions, leaking or bleeding  Good fetal movement  Has previously declined all vaccines  TWG 32# goal 25-35#  Encouraged continued activity  Glucose intolerance  Failed 1 hour  Passed 3 hour  The patient was counseled about the labor process  She was counseled regarding potential reasons that she may need a  section including arrest of dilation/descent, non-reassuring fetal status, or worsening maternal status  She was counseled on the risks of  including bleeding, infection, and injury to surrounding structures including the bowel and bladder  She was counseled that there are medical and surgical methods to manage excessive postpartum bleeding  She was counseled that in the event of excessive blood loss, she may require blood transfusion which includes a small risk of blood borne diseases such as hepatitis and HIV  The patient is OK with receiving a blood transfusion if necessary  The patient had an opportunity to ask questions and signed consent  She was counseled about the possible need for operative delivery using the vacuum or forceps and the indications for doing so  She was counseled that there is a small risk of  complications including intracranial hemorrhage, lacerations, nerve damage or fracture as well as the increased risk for more severe maternal laceration  The patient signed consent

## 2022-02-11 NOTE — PATIENT INSTRUCTIONS
Pregnancy at 28 to 38 Weeks   AMBULATORY CARE:   Changes happening with your body: You are considered full term at the beginning of 37 weeks  Your breathing may be easier if your baby has moved down into a head-down position  You may need to urinate more often because the baby may be pressing on your bladder  You may also feel more discomfort and get tired easily  Seek care immediately if:   · You develop a severe headache that does not go away  · You have new or increased vision changes, such as blurred or spotted vision  · You have new or increased swelling in your face or hands  · You have vaginal spotting or bleeding  · Your water broke or you feel warm water gushing or trickling from your vagina  Call your obstetrician if:   · You have more than 5 contractions in 1 hour  · You notice any changes in your baby's movements  · You have abdominal cramps, pressure, or tightening  · You have a change in vaginal discharge  · You have chills or a fever  · You have vaginal itching, burning, or pain  · You have yellow, green, white, or foul-smelling vaginal discharge  · You have pain or burning when you urinate, less urine than usual, or pink or bloody urine  · You have questions or concerns about your condition or care  How to care for yourself at this stage of your pregnancy:       · Eat a variety of healthy foods  Healthy foods include fruits, vegetables, whole-grain breads, low-fat dairy foods, beans, lean meats, and fish  Drink liquids as directed  Ask how much liquid to drink each day and which liquids are best for you  Limit caffeine to less than 200 milligrams each day  Limit your intake of fish to 2 servings each week  Choose fish low in mercury such as canned light tuna, shrimp, salmon, cod, or tilapia  Do not  eat fish high in mercury such as swordfish, tilefish, jorje mackerel, and shark  · Take prenatal vitamins as directed    Your need for certain vitamins and minerals, such as folic acid, increases during pregnancy  Prenatal vitamins provide some of the extra vitamins and minerals you need  Prenatal vitamins may also help to decrease the risk of certain birth defects  · Rest as needed  Put your feet up if you have swelling in your ankles and feet  · Talk to your healthcare provider about exercise  Moderate exercise can help you stay fit  Your healthcare provider will help you plan an exercise program that is safe for you during pregnancy  · Do not smoke  Smoking increases your risk of a miscarriage and other health problems during your pregnancy  Smoking can cause your baby to be born early or weigh less at birth  Ask your healthcare provider for information if you need help quitting  · Do not drink alcohol  Alcohol passes from your body to your baby through the placenta  It can affect your baby's brain development and cause fetal alcohol syndrome (FAS)  FAS is a group of conditions that causes mental, behavior, and growth problems  · Talk to your healthcare provider before you take any medicines  Many medicines may harm your baby if you take them when you are pregnant  Do not take any medicines, vitamins, herbs, or supplements without first talking to your healthcare provider  Never use illegal or street drugs (such as marijuana or cocaine) while you are pregnant  Safety tips during pregnancy:   · Avoid hot tubs and saunas  Do not use a hot tub or sauna while you are pregnant, especially during your first trimester  Hot tubs and saunas may raise your baby's temperature and increase the risk of birth defects  · Avoid toxoplasmosis  This is an infection caused by eating raw meat or being around infected cat feces  It can cause birth defects, miscarriages, and other problems  Wash your hands after you touch raw meat  Make sure any meat is well-cooked before you eat it  Avoid raw eggs and unpasteurized milk   Use gloves or ask someone else to clean your cat's litter box while you are pregnant  · Ask your healthcare provider about travel  The most comfortable time to travel is during the second trimester  Ask your provider if you can travel after 36 weeks  You may not be able to travel in an airplane after 36 weeks  He or she may also recommend you avoid long road trips  Changes happening with your baby:  By 38 weeks, your baby may weigh between 6 and 9 pounds  Your baby may be about 14 inches long from the top of the head to the rump (baby's bottom)  Your baby hears well enough to know your voice  As your baby gets larger, you may feel fewer kicks and more stretching and rolling  Your baby may move into a head-down position  Your baby will also rest lower in your abdomen  What you need to know about prenatal care: Your healthcare provider will check your blood pressure and weight  You may also need the following:  · A urine test  may also be done to check for sugar and protein  These can be signs of gestational diabetes or infection  Protein in your urine may also be a sign of preeclampsia  Preeclampsia is a condition that can develop during week 20 or later of your pregnancy  It causes high blood pressure, and it can cause problems with your kidneys and other organs  · A blood test  may be done to check for anemia (low iron level)  · A Tdap vaccine  may be recommended by your healthcare provider  · A group B strep test  is a test that is done to check for group B strep infection  Group B strep is a type of bacteria that may be found in the vagina or rectum  It can be passed to your baby during delivery if you have it  Your healthcare provider will take swab your vagina or rectum and send the sample to the lab for tests  · Fundal height  is a measurement of your uterus to check your baby's growth  This number is usually the same as the number of weeks that you have been pregnant   Your healthcare provider may also check your baby's position  · Your baby's heart rate  will be checked  Follow up with your obstetrician as directed:  Write down your questions so you remember to ask them during your visits  © Copyright [employer] 2021 Information is for End User's use only and may not be sold, redistributed or otherwise used for commercial purposes  All illustrations and images included in CareNotes® are the copyrighted property of A D A M , Inc  or Pat Villalobos   The above information is an  only  It is not intended as medical advice for individual conditions or treatments  Talk to your doctor, nurse or pharmacist before following any medical regimen to see if it is safe and effective for you  Kick Counts in Pregnancy   AMBULATORY CARE:   Kick counts  measure how much your baby is moving in your womb  A kick from your baby can be felt as a twist, turn, swish, roll, or jab  It is common to feel your baby kicking at 26 to 28 weeks of pregnancy  You may feel your baby kick as early as 20 weeks of pregnancy  You may want to start counting at 28 weeks  Contact your doctor immediately if:   · You feel a change in the number of kicks or movements of your baby  · You feel fewer than 10 kicks within 2 hours  · You have questions or concerns about your baby's movements  Why measure kick counts:  Your baby's movement may provide information about your baby's health  He or she may move less, or not at all, if there are problems  Your baby may move less if he or she is not getting enough oxygen or nutrition from the placenta  Do not smoke while you are pregnant  Smoking decreases the amount of oxygen that gets to your baby  Talk to your healthcare provider if you need help to quit smoking  Tell your healthcare provider as soon as you feel a change in your baby's movements  When to measure kick counts:   · Measure kick counts at the same time every day        · Measure kick counts when your baby is awake and most active  Your baby may be most active in the evening  How to measure kick counts:  Check that your baby is awake before you measure kick counts  You can wake up your baby by lightly pushing on your belly, walking, or drinking something cold  Your healthcare provider may tell you different ways to measure kick counts  You may be told to do the following:  · Use a chart or clock to keep track of the time you start and finish counting  · Sit in a chair or lie on your left side  · Place your hands on the largest part of your belly  · Count until you reach 10 kicks  Write down how much time it takes to count 10 kicks  · It may take 30 minutes to 2 hours to count 10 kicks  It should not take more than 2 hours to count 10 kicks  Follow up with your doctor as directed:  Write down your questions so you remember to ask them during your visits  © Copyright [employer] 2021 Information is for End User's use only and may not be sold, redistributed or otherwise used for commercial purposes  All illustrations and images included in CareNotes® are the copyrighted property of A D A M , Inc  or [employer]   The above information is an  only  It is not intended as medical advice for individual conditions or treatments  Talk to your doctor, nurse or pharmacist before following any medical regimen to see if it is safe and effective for you  Perineal Massage    Perineal massage is recommended starting after 34 weeks in order to reduce risks of perineal tearing during childbirth  You have been provided and instructional sheet in your yellow 28 week prenatal packet  Early Labor Signs   AMBULATORY CARE:   Early labor signs and symptoms  are the changes in your body that signal your baby is getting ready to be delivered  Early labor signs can happen weeks, days or hours before delivery    Call 911 for any of the following:   · You have heavy vaginal bleeding  · You cannot get to the hospital before the baby starts to come out  Seek care immediately if:   · You have regular, painful contractions that are less than 5 minutes apart and last 30 to 70 seconds each  · You have a constant trickle or sudden gush of clear fluid from your vagina  · You notice a sudden decrease in your baby's movement  Contact your obstetrician or healthcare provider if:   · You have pain in your lower back or abdomen that does not get better when you change positions  · You have bloody mucus or show  · You have questions or concerns about your condition or care  Early labor signs and symptoms:   · Lightening  occurs when your baby drops inside your pelvis  You may feel increased pressure in your pelvis  This may happen a few weeks to a few hours before your labor begins  · Contractions  are cramps and tightening that occur in your uterus to help move the baby through your birth canal  Contractions occur regularly and more often each time  Each one lasts about 30 to 70 seconds, and gets stronger until you deliver your baby  Contractions do not go away with movement  The pain usually starts in your lower back and moves to your abdomen  · Effacement  occurs when your cervix softens and thins, so it can easily open for the baby  You will not be able to feel effacement  Your healthcare provider will examine your cervix for effacement  · Dilation  is widening of your cervix  Your healthcare provider will examine your cervix for dilation  Your cervix may start to dilate weeks before your baby is delivered  Your cervix will be fully opened and ready for delivery when it is dilated to 10 centimeters  · Increased discharge  from your vagina may occur  It may be brown, pink, clear, or slightly bloody  This discharge may also be called bloody show  Bloody show is a mucus plug that forms and blocks your cervix during pregnancy   The discharge may mean that your cervix is opening up and getting ready for delivery  · Rupture of membranes  is a sudden release of clear fluid from your vagina  Ruptured membranes means your water broke  Your healthcare provider may need to break your water if it does not happen on its own  False labor: You may have false labor signs, which are also called [place] Oliver contractions  False labor is common and may happen several weeks or days before your actual labor  The contractions are not regular, and do not get closer together  The pain is usually mild, does not worsen, and is felt only in front  Sohan Oliver contractions may happen later in the day, and stop after you change position, walk, or rest   Follow up with your obstetrician or healthcare provider as directed:  Write down your questions so you remember to ask them during your visit  © Copyright [employer] 2021 Information is for End User's use only and may not be sold, redistributed or otherwise used for commercial purposes  All illustrations and images included in CareNotes® are the copyrighted property of A D A M , Inc  or [employer]   The above information is an  only  It is not intended as medical advice for individual conditions or treatments  Talk to your doctor, nurse or pharmacist before following any medical regimen to see if it is safe and effective for you  Having Your Baby: The Labor Process   AMBULATORY CARE:   The labor process  is a series of 3 stages that your body goes through to deliver your baby  It is not known for sure what causes labor to begin  Hormones made by you and your baby and changes in your uterus may help labor to start  Labor usually starts 2 weeks before or after your due date  Most women do not have their baby exactly on their due date  Call your obstetrician if:   · You have vaginal spotting or bleeding  · Your water broke or you feel warm water gushing or trickling from your vagina      · You have more than 5 contractions in 1 hour  · You have bloody mucus or show  · You notice any changes in your baby's movements  · You have abdominal cramps, pressure, or tightening  · You have a change in vaginal discharge  · You have questions or concerns about your condition or care  First stage of labor: The first stage of labor includes latent (early) labor and active labor  This stage may last up to 12 hours if this is your first pregnancy  It may last up to 10 hours if you delivered a baby before  Your uterus will contract to prepare your cervix for delivery and to push your baby out of the birth canal  Your cervix will dilate (widen) and efface (soften and become thinner)  Your contractions may last from 30 to 60 seconds  The contractions usually start in the back and move to the front  You may also have a pink, clear, or slightly bloody discharge called bloody show  Bloody show is caused by the movement of a mucus plug from your cervix  During pregnancy the mucus plug blocks your cervix to prevent it from opening  What to do during early labor:  Early labor may last for several hours  You will most likely be at home during early labor  Rest as much as possible while you are at home  Have someone rub your back  It may be helpful to place ice packs on your lower back  Go for a short walk if you are able  Drink water and suck on ice chips  Ask your healthcare provider if it is okay to eat during early labor  How to know when you are in active labor: This stage may last up to 12 hours if this is your first pregnancy  It may last up to 10 hours if you delivered a baby before  Your contractions will get stronger, last longer, and happen more frequently  They will also become more intense and painful  Time your contractions from the beginning of one to the beginning of the next  Write this information down for 1 hour  Your healthcare provider will tell you when to go to the hospital or birthing center   This will be based on how many minutes apart your contractions are  Second stage of labor:  The second stage is the time between full cervix dilation and the birth of your baby  Your cervix will be completely dilated to 10 centimeters and your baby will be ready to be born  The second stage usually lasts 20 minutes to 2 hours  It may last up to 3 hours if this is your first baby  · You may be given antibiotics to fight a bacterial infection you have or prevent an infection during delivery  · Healthcare providers will help you find a position for giving birth that is comfortable for you  You can lie on your back, have your feet up in stirrups, or squat  · You may feel pressure on your rectum and the urge to push  This pressure is caused by the movement of your baby's head down the birth canal  Your healthcare provider will have you push when you feel the urge  He or she will guide your baby out of the birth canal  Forceps or suction may be used to help deliver your baby  You may also need an episiotomy (incision) to make the vaginal opening larger  This will make more room for your baby  Your perineum will be protected during delivery  This may be with a warm compress or massage of the area  · At least 1 minute after your baby is born, your healthcare provider will put clamps on the umbilical cord  The cord will then be cut  Your baby may be placed on your chest right away  He or she may also start breastfeeding  Third stage of labor:  The placenta (afterbirth) is delivered during this stage  After you give birth, your uterus will continue to contract to help push out the placenta  These contractions will begin 5 to 30 minutes after you give birth  Your healthcare provider will tell you when to push  You may have chills or shakiness during this stage  You may be given medicine to help prevent heavy bleeding that can happen during this stage    How to manage labor pain:  Pain can be managed naturally or with medicines  You can naturally manage pain by using relaxation methods and controlled breathing  There are different types of medicines that can be used to relieve pain while you are in labor  These medicines may be given through an IV or an epidural (thin catheter in your lower back)  Talk with your healthcare about your options for pain medicines if you choose to use them  Tell your provider if you prefer not to have any pain control medicines during labor  © Copyright [employer]  Information is for End User's use only and may not be sold, redistributed or otherwise used for commercial purposes  All illustrations and images included in CareNotes® are the copyrighted property of A D A M , Inc  or [facility] [employer]   The above information is an  only  It is not intended as medical advice for individual conditions or treatments  Talk to your doctor, nurse or pharmacist before following any medical regimen to see if it is safe and effective for you  Valuable Online Resource:    [facility] pregnancy essential guide    [email or web address] [email or web address] [email or web address]      On the right side of the screen is a 50 page guide providing valuable information about your entire pregnancy  On the left hand side of the site you will see several other links to great information and resources that [facility] offers     If you click on the tab that says "Pregnancy and Birth Packet" this opens another  guide to labor and delivery information as well as breast feeding information,  care, pediatricians, car seat safety and much more     The [facility] Baby and [address] tab has a virtual tour of the new L&D unit, as well as valuable information about classes that are offered, breast feeding support, support groups and much more  I highly recommend the virtual Breast Feeding class, very informational even if you have breast fed in the past  Check for available dates !     Click around and enjoy all we have to offer!     Please note that all information in regards to locations and visiting hours have not been updated due to 2 Nydia Craig delivery location is [address] [place] [address] @ [facility] [address], [id number] [facility] [id number]

## 2022-02-11 NOTE — ASSESSMENT & PLAN NOTE
Katina Edmond is a 31 y o    36w5d who presents for routine PNV  28 week labs reviewed:   TWG 15 kg (33 lb)   Denies OB complaints  Good fetal movement  Denies contractions, cramping, leakage of fluid or vaginal bleeding  S/p Tdap vaccine  Reviewed  labor precautions and FKCs     Advised to continue Perineal massage   Pregnancy Essential guide and Baby and Me web site recommended

## 2022-02-15 ENCOUNTER — ROUTINE PRENATAL (OUTPATIENT)
Dept: OBGYN CLINIC | Facility: CLINIC | Age: 27
End: 2022-02-15

## 2022-02-15 VITALS
DIASTOLIC BLOOD PRESSURE: 70 MMHG | HEIGHT: 60 IN | BODY MASS INDEX: 29.45 KG/M2 | SYSTOLIC BLOOD PRESSURE: 118 MMHG | WEIGHT: 150 LBS

## 2022-02-15 DIAGNOSIS — Z34.03 ENCOUNTER FOR SUPERVISION OF NORMAL FIRST PREGNANCY IN THIRD TRIMESTER: Primary | ICD-10-CM

## 2022-02-15 LAB
SL AMB  POCT GLUCOSE, UA: NEGATIVE
SL AMB POCT URINE PROTEIN: NEGATIVE

## 2022-02-15 PROCEDURE — PNV: Performed by: OBSTETRICS & GYNECOLOGY

## 2022-02-15 NOTE — PROGRESS NOTES
Problem   Encounter for Supervision of Normal First Pregnancy in Third Trimester    O+  + GBS in urine   Prenatal labs WNL  28 week labs, HGB 12 2 , abnormal 1 hr @ 141, Normal 3 hr Declines Tdap, flu, &  COVID vaccines   28 week folder given at 30 5 week visit     BP & L&D forms returned  Delivery consent on file        Encounter for supervision of normal first pregnancy in third trimester  Deysi Ferrell is a 31 y o    36w5d who presents for routine PNV  28 week labs reviewed:   TWG 15 kg (33 lb)   Denies OB complaints  Good fetal movement  Denies contractions, cramping, leakage of fluid or vaginal bleeding  S/p Tdap vaccine  Reviewed  labor precautions and FKCs     Advised to continue Perineal massage   Pregnancy Essential guide and Baby and Me web site recommended

## 2022-02-15 NOTE — PROGRESS NOTES
Patient is 36 weeks 5 days with no concerns   Positive fetal movements with NO contractions  Patient reports NO lost of fluids   Patient is planning to breast feed the baby

## 2022-02-18 NOTE — PROGRESS NOTES
Encounter for supervision of normal first pregnancy in first trimester  PN1  [address]  is a 31y o  year old  at 12 5 weeks  presents for initial prenatal visit  Planned pregnancy  Works as a      PMHx noncontributory  The patient denies complaints  She was having N&V but that is starting to resolve  Denies pelvic pain, bleeding, LOF  Exam WNL  Prenatal labs WNL  Rh pos  Pap and gc/chl sent today  Reviewed options for low risk aneuploidy screening  Discussed risks/benefits/limitations  The patient reports she is scheduled for [facility] appt  Patient Education: Patient was counseled regarding diet, exercise, weight gain, foods to avoid, vaccines in pregnancy, aneuploidy screening, travel precautions to include seat belt use and VTE risk reduction  She has been provided our pregnancy packet which includes pregnancy warning signs,how and when to contact providers, visit intervals, Maternal fetal medicine information, medication recommendations that are safe during pregnancy, dietary suggestions, activity recommendations, breastfeeding information as well as websites for additional information, and delivery location  [Mother] : mother

## 2022-02-18 NOTE — ASSESSMENT & PLAN NOTE
Jesus Ny is a 31 y o    37w6d who presents for routine PNV  28 week labs reviewed:   TWG 15 kg (33 lb)   Denies OB complaints  Good fetal movement  Denies contractions, cramping, leakage of fluid or vaginal bleeding  declines Tdap vaccine  Reviewed  labor precautions and FKCs     Advised to start Perineal massage at 34-36 weeks   Pregnancy Essential guide and Baby and Me web site recommended

## 2022-02-18 NOTE — PATIENT INSTRUCTIONS
Pregnancy at 44 to 40 Weeks   AMBULATORY CARE:   Changes happening with your body: You are now getting close to your due date  Your due date is just an estimate of when your baby will be born  Your baby may be born before or after your due date  Your breathing may be easier if your baby has moved down into a head-down position  You may need to urinate more often because the baby may be pressing on your bladder  You may also feel more discomfort and tire easily  You may also be having trouble sleeping  Seek care immediately if:   · You develop a severe headache that does not go away  · You have new or increased vision changes, such as blurred or spotted vision  · You have new or increased swelling in your face or hands  · You have vaginal spotting or bleeding  · Your water broke or you feel warm water gushing or trickling from your vagina  Call your obstetrician if:   · You have more than 5 contractions in 1 hour  · You notice any changes in your baby's movements  · You have abdominal cramps, pressure, or tightening  · You have a change in vaginal discharge  · You have chills or a fever  · You have vaginal itching, burning, or pain  · You have yellow, green, white, or foul-smelling vaginal discharge  · You have pain or burning when you urinate, less urine than usual, or pink or bloody urine  · You have questions or concerns about your condition or care  How to care for yourself at this stage of your pregnancy:       · Eat a variety of healthy foods  Healthy foods include fruits, vegetables, whole-grain breads, low-fat dairy foods, beans, lean meats, and fish  Drink liquids as directed  Ask how much liquid to drink each day and which liquids are best for you  Limit caffeine to less than 200 milligrams each day  Limit your intake of fish to 2 servings each week  Choose fish low in mercury such as canned light tuna, shrimp, salmon, cod, or tilapia   Do not  eat fish high in mercury such as swordfish, tilefish, jorje mackerel, and shark  · Take prenatal vitamins as directed  Your need for certain vitamins and minerals, such as folic acid, increases during pregnancy  Prenatal vitamins provide some of the extra vitamins and minerals you need  Prenatal vitamins may also help to decrease the risk of certain birth defects  · Rest as needed  Put your feet up if you have swelling in your ankles and feet  · Talk to your healthcare provider about exercise  Moderate exercise can help you stay fit  Your healthcare provider will help you plan an exercise program that is safe for you during pregnancy  · Do not smoke  Smoking increases your risk of a miscarriage and other health problems during your pregnancy  Smoking can cause your baby to be born early or weigh less at birth  Ask your healthcare provider for information if you need help quitting  · Do not drink alcohol  Alcohol passes from your body to your baby through the placenta  It can affect your baby's brain development and cause fetal alcohol syndrome (FAS)  FAS is a group of conditions that causes mental, behavior, and growth problems  · Talk to your healthcare provider before you take any medicines  Many medicines may harm your baby if you take them when you are pregnant  Do not take any medicines, vitamins, herbs, or supplements without first talking to your healthcare provider  Never use illegal or street drugs (such as marijuana or cocaine) while you are pregnant  Safety tips during pregnancy:   · Avoid hot tubs and saunas  Do not use a hot tub or sauna while you are pregnant, especially during your first trimester  Hot tubs and saunas may raise your baby's temperature and increase the risk of birth defects  · Avoid toxoplasmosis  This is an infection caused by eating raw meat or being around infected cat feces  It can cause birth defects, miscarriages, and other problems   Wash your hands after you touch raw meat  Make sure any meat is well-cooked before you eat it  Avoid raw eggs and unpasteurized milk  Use gloves or ask someone else to clean your cat's litter box while you are pregnant  · Ask your healthcare provider about travel  The most comfortable time to travel is during the second trimester  Ask your provider if you can travel after 36 weeks  You may not be able to travel in an airplane after 36 weeks  He or she may also recommend that you avoid long road trips  Changes happening with your baby: Your baby is ready to be born  At birth, your baby may weigh about 6 to 9 pounds and be about 19 to 21 inches long  Your baby may be in a head-down position  Your baby will also rest lower in your abdomen  What you need to know about prenatal care: Your healthcare provider will check your blood pressure and weight  You may also need the following:  · A urine test  may also be done to check for sugar and protein  These can be signs of gestational diabetes or infection  Protein in your urine may also be a sign of preeclampsia  Preeclampsia is a condition that can develop during week 20 or later of your pregnancy  It causes high blood pressure, and it can cause problems with your kidneys and other organs  · Your baby's heart rate  will be checked  Follow up with your obstetrician as directed:  Write down your questions so you remember to ask them during your visits  © Copyright [employer] 2021 Information is for End User's use only and may not be sold, redistributed or otherwise used for commercial purposes  All illustrations and images included in CareNotes® are the copyrighted property of A D A M [employer] Inc  or [facility] Tanvi Villalobos   The above information is an  only  It is not intended as medical advice for individual conditions or treatments   Talk to your doctor, nurse or pharmacist before following any medical regimen to see if it is safe and effective for you     Kick Counts in Pregnancy   AMBULATORY CARE:   Kick counts  measure how much your baby is moving in your womb  A kick from your baby can be felt as a twist, turn, swish, roll, or jab  It is common to feel your baby kicking at 26 to 28 weeks of pregnancy  You may feel your baby kick as early as 20 weeks of pregnancy  You may want to start counting at 28 weeks  Contact your doctor immediately if:   · You feel a change in the number of kicks or movements of your baby  · You feel fewer than 10 kicks within 2 hours  · You have questions or concerns about your baby's movements  Why measure kick counts:  Your baby's movement may provide information about your baby's health  He or she may move less, or not at all, if there are problems  Your baby may move less if he or she is not getting enough oxygen or nutrition from the placenta  Do not smoke while you are pregnant  Smoking decreases the amount of oxygen that gets to your baby  Talk to your healthcare provider if you need help to quit smoking  Tell your healthcare provider as soon as you feel a change in your baby's movements  When to measure kick counts:   · Measure kick counts at the same time every day  · Measure kick counts when your baby is awake and most active  Your baby may be most active in the evening  How to measure kick counts:  Check that your baby is awake before you measure kick counts  You can wake up your baby by lightly pushing on your belly, walking, or drinking something cold  Your healthcare provider may tell you different ways to measure kick counts  You may be told to do the following:  · Use a chart or clock to keep track of the time you start and finish counting  · Sit in a chair or lie on your left side  · Place your hands on the largest part of your belly  · Count until you reach 10 kicks  Write down how much time it takes to count 10 kicks  · It may take 30 minutes to 2 hours to count 10 kicks   It should not take more than 2 hours to count 10 kicks  Follow up with your doctor as directed:  Write down your questions so you remember to ask them during your visits  © Copyright [employer] 2021 Information is for End User's use only and may not be sold, redistributed or otherwise used for commercial purposes  All illustrations and images included in CareNotes® are the copyrighted property of [employer]  or Pat Villalobos   The above information is an  only  It is not intended as medical advice for individual conditions or treatments  Talk to your doctor, nurse or pharmacist before following any medical regimen to see if it is safe and effective for you  Perineal Massage    Perineal massage is recommended starting after 34 weeks in order to reduce risks of perineal tearing during childbirth  You have been provided and instructional sheet in your yellow 28 week prenatal packet  Early Labor Signs   AMBULATORY CARE:   Early labor signs and symptoms  are the changes in your body that signal your baby is getting ready to be delivered  Early labor signs can happen weeks, days or hours before delivery  Call 911 for any of the following:   · You have heavy vaginal bleeding  · You cannot get to the hospital before the baby starts to come out  Seek care immediately if:   · You have regular, painful contractions that are less than 5 minutes apart and last 30 to 70 seconds each  · You have a constant trickle or sudden gush of clear fluid from your vagina  · You notice a sudden decrease in your baby's movement  Contact your obstetrician or healthcare provider if:   · You have pain in your lower back or abdomen that does not get better when you change positions  · You have bloody mucus or show  · You have questions or concerns about your condition or care  Early labor signs and symptoms:   · Lightening  occurs when your baby drops inside your pelvis   You may feel increased pressure in your pelvis  This may happen a few weeks to a few hours before your labor begins  · Contractions  are cramps and tightening that occur in your uterus to help move the baby through your birth canal  Contractions occur regularly and more often each time  Each one lasts about 30 to 70 seconds, and gets stronger until you deliver your baby  Contractions do not go away with movement  The pain usually starts in your lower back and moves to your abdomen  · Effacement  occurs when your cervix softens and thins, so it can easily open for the baby  You will not be able to feel effacement  Your healthcare provider will examine your cervix for effacement  · Dilation  is widening of your cervix  Your healthcare provider will examine your cervix for dilation  Your cervix may start to dilate weeks before your baby is delivered  Your cervix will be fully opened and ready for delivery when it is dilated to 10 centimeters  · Increased discharge  from your vagina may occur  It may be brown, pink, clear, or slightly bloody  This discharge may also be called bloody show  Bloody show is a mucus plug that forms and blocks your cervix during pregnancy  The discharge may mean that your cervix is opening up and getting ready for delivery  · Rupture of membranes  is a sudden release of clear fluid from your vagina  Ruptured membranes means your water broke  Your healthcare provider may need to break your water if it does not happen on its own  False labor: You may have false labor signs, which are also called Sohan Olvier contractions  False labor is common and may happen several weeks or days before your actual labor  The contractions are not regular, and do not get closer together  The pain is usually mild, does not worsen, and is felt only in front   [place] Oliver contractions may happen later in the day, and stop after you change position, walk, or rest   Follow up with your obstetrician or healthcare provider as directed:  Write down your questions so you remember to ask them during your visit  © Copyright [employer] 2021 Information is for End User's use only and may not be sold, redistributed or otherwise used for commercial purposes  All illustrations and images included in CareNotes® are the copyrighted property of A [employer] A M [employer] Inc  or Pat Reyes  The above information is an  only  It is not intended as medical advice for individual conditions or treatments  Talk to your doctor, nurse or pharmacist before following any medical regimen to see if it is safe and effective for you  Having Your Baby: The Labor Process   AMBULATORY CARE:   The labor process  is a series of 3 stages that your body goes through to deliver your baby  It is not known for sure what causes labor to begin  Hormones made by you and your baby and changes in your uterus may help labor to start  Labor usually starts 2 weeks before or after your due date  Most women do not have their baby exactly on their due date  Call your obstetrician if:   · You have vaginal spotting or bleeding  · Your water broke or you feel warm water gushing or trickling from your vagina  · You have more than 5 contractions in 1 hour  · You have bloody mucus or show  · You notice any changes in your baby's movements  · You have abdominal cramps, pressure, or tightening  · You have a change in vaginal discharge  · You have questions or concerns about your condition or care  First stage of labor: The first stage of labor includes latent (early) labor and active labor  This stage may last up to 12 hours if this is your first pregnancy  It may last up to 10 hours if you delivered a baby before  Your uterus will contract to prepare your cervix for delivery and to push your baby out of the birth canal  Your cervix will dilate (widen) and efface (soften and become thinner)   Your contractions may last from 30 to 60 seconds  The contractions usually start in the back and move to the front  You may also have a pink, clear, or slightly bloody discharge called bloody show  Bloody show is caused by the movement of a mucus plug from your cervix  During pregnancy the mucus plug blocks your cervix to prevent it from opening  What to do during early labor:  Early labor may last for several hours  You will most likely be at home during early labor  Rest as much as possible while you are at home  Have someone rub your back  It may be helpful to place ice packs on your lower back  Go for a short walk if you are able  Drink water and suck on ice chips  Ask your healthcare provider if it is okay to eat during early labor  How to know when you are in active labor: This stage may last up to 12 hours if this is your first pregnancy  It may last up to 10 hours if you delivered a baby before  Your contractions will get stronger, last longer, and happen more frequently  They will also become more intense and painful  Time your contractions from the beginning of one to the beginning of the next  Write this information down for 1 hour  Your healthcare provider will tell you when to go to the hospital or birthing center  This will be based on how many minutes apart your contractions are  Second stage of labor:  The second stage is the time between full cervix dilation and the birth of your baby  Your cervix will be completely dilated to 10 centimeters and your baby will be ready to be born  The second stage usually lasts 20 minutes to 2 hours  It may last up to 3 hours if this is your first baby  · You may be given antibiotics to fight a bacterial infection you have or prevent an infection during delivery  · Healthcare providers will help you find a position for giving birth that is comfortable for you  You can lie on your back, have your feet up in stirrups, or squat  · You may feel pressure on your rectum and the urge to push  This pressure is caused by the movement of your baby's head down the birth canal  Your healthcare provider will have you push when you feel the urge  He or she will guide your baby out of the birth canal  Forceps or suction may be used to help deliver your baby  You may also need an episiotomy (incision) to make the vaginal opening larger  This will make more room for your baby  Your perineum will be protected during delivery  This may be with a warm compress or massage of the area  · At least 1 minute after your baby is born, your healthcare provider will put clamps on the umbilical cord  The cord will then be cut  Your baby may be placed on your chest right away  He or she may also start breastfeeding  Third stage of labor:  The placenta (afterbirth) is delivered during this stage  After you give birth, your uterus will continue to contract to help push out the placenta  These contractions will begin 5 to 30 minutes after you give birth  Your healthcare provider will tell you when to push  You may have chills or shakiness during this stage  You may be given medicine to help prevent heavy bleeding that can happen during this stage  How to manage labor pain:  Pain can be managed naturally or with medicines  You can naturally manage pain by using relaxation methods and controlled breathing  There are different types of medicines that can be used to relieve pain while you are in labor  These medicines may be given through an IV or an epidural (thin catheter in your lower back)  Talk with your healthcare about your options for pain medicines if you choose to use them  Tell your provider if you prefer not to have any pain control medicines during labor  © Copyright [employer] 2021 Information is for End User's use only and may not be sold, redistributed or otherwise used for commercial purposes   All illustrations and images included in CareNotes® are the copyrighted property of [employer]  or [facility]  The above information is an  only  It is not intended as medical advice for individual conditions or treatments  Talk to your doctor, nurse or pharmacist before following any medical regimen to see if it is safe and effective for you

## 2022-02-23 ENCOUNTER — ROUTINE PRENATAL (OUTPATIENT)
Dept: OBGYN CLINIC | Facility: CLINIC | Age: 27
End: 2022-02-23

## 2022-02-23 VITALS
BODY MASS INDEX: 29.45 KG/M2 | WEIGHT: 150 LBS | SYSTOLIC BLOOD PRESSURE: 110 MMHG | DIASTOLIC BLOOD PRESSURE: 68 MMHG | HEIGHT: 60 IN

## 2022-02-23 DIAGNOSIS — Z34.03 ENCOUNTER FOR SUPERVISION OF NORMAL FIRST PREGNANCY IN THIRD TRIMESTER: Primary | ICD-10-CM

## 2022-02-23 LAB
SL AMB  POCT GLUCOSE, UA: NEGATIVE
SL AMB POCT URINE PROTEIN: NEGATIVE

## 2022-02-23 PROCEDURE — PNV: Performed by: OBSTETRICS & GYNECOLOGY

## 2022-02-23 NOTE — PROGRESS NOTES
Problem   Encounter for Supervision of Normal First Pregnancy in Third Trimester    O+  + GBS in urine   Prenatal labs WNL    28 week labs, HGB 12 2 , abnormal 1 hr @ 141, Normal 3 hr   Declines Tdap, flu, &  COVID vaccines   28 week folder given at 30 5 week visit     BP & L&D forms returned  Delivery consent on file        Encounter for supervision of normal first pregnancy in third trimester  Karla Regalado is a 31 y o    37w6d who presents for routine PNV  28 week labs reviewed:   TWG 15 kg (33 lb)   Denies OB complaints  Good fetal movement  Denies contractions, cramping, leakage of fluid or vaginal bleeding  declines Tdap vaccine  Reviewed  labor precautions and FKCs     Advised to start Perineal massage at 34-36 weeks   Pregnancy Essential guide and Baby and Me web site recommended

## 2022-02-23 NOTE — PROGRESS NOTES
Patient is here for routine prenatal visit positive fetal movements with no lost of fluids and no contraction at this time   Patient reports that she is doing well so far in the pregnancy   Patient declined the Flu vaccine and the T-Dap vaccine

## 2022-02-28 NOTE — PATIENT INSTRUCTIONS
Pregnancy at 44 to 40 Weeks   AMBULATORY CARE:   Changes happening with your body: You are now getting close to your due date  Your due date is just an estimate of when your baby will be born  Your baby may be born before or after your due date  Your breathing may be easier if your baby has moved down into a head-down position  You may need to urinate more often because the baby may be pressing on your bladder  You may also feel more discomfort and tire easily  You may also be having trouble sleeping  Seek care immediately if:   · You develop a severe headache that does not go away  · You have new or increased vision changes, such as blurred or spotted vision  · You have new or increased swelling in your face or hands  · You have vaginal spotting or bleeding  · Your water broke or you feel warm water gushing or trickling from your vagina  Call your obstetrician if:   · You have more than 5 contractions in 1 hour  · You notice any changes in your baby's movements  · You have abdominal cramps, pressure, or tightening  · You have a change in vaginal discharge  · You have chills or a fever  · You have vaginal itching, burning, or pain  · You have yellow, green, white, or foul-smelling vaginal discharge  · You have pain or burning when you urinate, less urine than usual, or pink or bloody urine  · You have questions or concerns about your condition or care  How to care for yourself at this stage of your pregnancy:       · Eat a variety of healthy foods  Healthy foods include fruits, vegetables, whole-grain breads, low-fat dairy foods, beans, lean meats, and fish  Drink liquids as directed  Ask how much liquid to drink each day and which liquids are best for you  Limit caffeine to less than 200 milligrams each day  Limit your intake of fish to 2 servings each week  Choose fish low in mercury such as canned light tuna, shrimp, salmon, cod, or tilapia   Do not  eat fish high in mercury such as swordfish, tilefish, jorje mackerel, and shark  · Take prenatal vitamins as directed  Your need for certain vitamins and minerals, such as folic acid, increases during pregnancy  Prenatal vitamins provide some of the extra vitamins and minerals you need  Prenatal vitamins may also help to decrease the risk of certain birth defects  · Rest as needed  Put your feet up if you have swelling in your ankles and feet  · Talk to your healthcare provider about exercise  Moderate exercise can help you stay fit  Your healthcare provider will help you plan an exercise program that is safe for you during pregnancy  · Do not smoke  Smoking increases your risk of a miscarriage and other health problems during your pregnancy  Smoking can cause your baby to be born early or weigh less at birth  Ask your healthcare provider for information if you need help quitting  · Do not drink alcohol  Alcohol passes from your body to your baby through the placenta  It can affect your baby's brain development and cause fetal alcohol syndrome (FAS)  FAS is a group of conditions that causes mental, behavior, and growth problems  · Talk to your healthcare provider before you take any medicines  Many medicines may harm your baby if you take them when you are pregnant  Do not take any medicines, vitamins, herbs, or supplements without first talking to your healthcare provider  Never use illegal or street drugs (such as marijuana or cocaine) while you are pregnant  Safety tips during pregnancy:   · Avoid hot tubs and saunas  Do not use a hot tub or sauna while you are pregnant, especially during your first trimester  Hot tubs and saunas may raise your baby's temperature and increase the risk of birth defects  · Avoid toxoplasmosis  This is an infection caused by eating raw meat or being around infected cat feces  It can cause birth defects, miscarriages, and other problems   Wash your hands after you touch raw meat  Make sure any meat is well-cooked before you eat it  Avoid raw eggs and unpasteurized milk  Use gloves or ask someone else to clean your cat's litter box while you are pregnant  · Ask your healthcare provider about travel  The most comfortable time to travel is during the second trimester  Ask your provider if you can travel after 36 weeks  You may not be able to travel in an airplane after 36 weeks  He or she may also recommend that you avoid long road trips  Changes happening with your baby: Your baby is ready to be born  At birth, your baby may weigh about 6 to 9 pounds and be about 19 to 21 inches long  Your baby may be in a head-down position  Your baby will also rest lower in your abdomen  What you need to know about prenatal care: Your healthcare provider will check your blood pressure and weight  You may also need the following:  · A urine test  may also be done to check for sugar and protein  These can be signs of gestational diabetes or infection  Protein in your urine may also be a sign of preeclampsia  Preeclampsia is a condition that can develop during week 20 or later of your pregnancy  It causes high blood pressure, and it can cause problems with your kidneys and other organs  · A gestational diabetes screen  may be done  Your healthcare provider may order either a 1-step or 2-step oral glucose tolerance test (OGTT)  ? 1-step OGTT:  Your blood sugar level will be tested after you have not eaten for 8 hours (fasting)  You will then be given a glucose drink  Your level will be tested again 1 hour and 2 hours after you finish the drink  ? 2-step OGTT:  You do not have to fast for the first part of the test  You will have the glucose drink at any time of day  Your blood sugar level will be checked 1 hour later  If your blood sugar is higher than a certain level, another test will be ordered  You will fast and your blood sugar level will be tested  You will have the glucose drink  Your blood will be tested again 1 hour, 2 hours, and 3 hours after you finish the glucose drink  · Your baby's heart rate  will be checked  Follow up with your obstetrician as directed:  Write down your questions so you remember to ask them during your visits  © [employer] 2022 Information is for End User's use only and may not be sold, redistributed or otherwise used for commercial purposes  All illustrations and images included in CareNotes® are the copyrighted property of A D A M , Inc  or [facility] [employer]   The above information is an  only  It is not intended as medical advice for individual conditions or treatments  Talk to your doctor, nurse or pharmacist before following any medical regimen to see if it is safe and effective for you  Kick Counts in Pregnancy   AMBULATORY CARE:   Kick counts  measure how much your baby is moving in your womb  A kick from your baby can be felt as a twist, turn, swish, roll, or jab  It is common to feel your baby kicking at 26 to 28 weeks of pregnancy  You may feel your baby kick as early as 20 weeks of pregnancy  You may want to start counting at 28 weeks  Contact your doctor immediately if:   · You feel a change in the number of kicks or movements of your baby  · You feel fewer than 10 kicks within 2 hours  · You have questions or concerns about your baby's movements  Why measure kick counts:  Your baby's movement may provide information about your baby's health  He or she may move less, or not at all, if there are problems  Your baby may move less if he or she is not getting enough oxygen or nutrition from the placenta  Do not smoke while you are pregnant  Smoking decreases the amount of oxygen that gets to your baby  Talk to your healthcare provider if you need help to quit smoking  Tell your healthcare provider as soon as you feel a change in your baby's movements    When to measure kick counts:   · Measure kick counts at the same time every day  · Measure kick counts when your baby is awake and most active  Your baby may be most active in the evening  How to measure kick counts:  Check that your baby is awake before you measure kick counts  You can wake up your baby by lightly pushing on your belly, walking, or drinking something cold  Your healthcare provider may tell you different ways to measure kick counts  You may be told to do the following:  · Use a chart or clock to keep track of the time you start and finish counting  · Sit in a chair or lie on your left side  · Place your hands on the largest part of your belly  · Count until you reach 10 kicks  Write down how much time it takes to count 10 kicks  · It may take 30 minutes to 2 hours to count 10 kicks  It should not take more than 2 hours to count 10 kicks  Follow up with your doctor as directed:  Write down your questions so you remember to ask them during your visits  © Copyright [employer] 2022 Information is for End User's use only and may not be sold, redistributed or otherwise used for commercial purposes  All illustrations and images included in CareNotes® are the copyrighted property of [employer] M [employer] Inc  or [facility]  The above information is an  only  It is not intended as medical advice for individual conditions or treatments  Talk to your doctor, nurse or pharmacist before following any medical regimen to see if it is safe and effective for you  Perineal Massage    Perineal massage is recommended starting after 34 weeks in order to reduce risks of perineal tearing during childbirth  You have been provided and instructional sheet in your yellow 28 week prenatal packet  Early Labor Signs   AMBULATORY CARE:   Early labor signs and symptoms  are the changes in your body that signal your baby is getting ready to be delivered   Early labor signs can happen weeks, days or hours before delivery  Call 911 for any of the following:   · You have heavy vaginal bleeding  · You cannot get to the hospital before the baby starts to come out  Seek care immediately if:   · You have regular, painful contractions that are less than 5 minutes apart and last 30 to 70 seconds each  · You have a constant trickle or sudden gush of clear fluid from your vagina  · You notice a sudden decrease in your baby's movement  Contact your obstetrician or healthcare provider if:   · You have pain in your lower back or abdomen that does not get better when you change positions  · You have bloody mucus or show  · You have questions or concerns about your condition or care  Early labor signs and symptoms:   · Lightening  occurs when your baby drops inside your pelvis  You may feel increased pressure in your pelvis  This may happen a few weeks to a few hours before your labor begins  · Contractions  are cramps and tightening that occur in your uterus to help move the baby through your birth canal  Contractions occur regularly and more often each time  Each one lasts about 30 to 70 seconds, and gets stronger until you deliver your baby  Contractions do not go away with movement  The pain usually starts in your lower back and moves to your abdomen  · Effacement  occurs when your cervix softens and thins, so it can easily open for the baby  You will not be able to feel effacement  Your healthcare provider will examine your cervix for effacement  · Dilation  is widening of your cervix  Your healthcare provider will examine your cervix for dilation  Your cervix may start to dilate weeks before your baby is delivered  Your cervix will be fully opened and ready for delivery when it is dilated to 10 centimeters  · Increased discharge  from your vagina may occur  It may be brown, pink, clear, or slightly bloody  This discharge may also be called bloody show   Bloody show is a mucus plug that forms and blocks your cervix during pregnancy  The discharge may mean that your cervix is opening up and getting ready for delivery  · Rupture of membranes  is a sudden release of clear fluid from your vagina  Ruptured membranes means your water broke  Your healthcare provider may need to break your water if it does not happen on its own  False labor: You may have false labor signs, which are also called Gem Oliver contractions  False labor is common and may happen several weeks or days before your actual labor  The contractions are not regular, and do not get closer together  The pain is usually mild, does not worsen, and is felt only in front  Gem Oliver contractions may happen later in the day, and stop after you change position, walk, or rest   Follow up with your obstetrician or healthcare provider as directed:  Write down your questions so you remember to ask them during your visit  © Copyright [employer] 2022 Information is for End User's use only and may not be sold, redistributed or otherwise used for commercial purposes  All illustrations and images included in CareNotes® are the copyrighted property of [employer]  or [facility] [employer]   The above information is an  only  It is not intended as medical advice for individual conditions or treatments  Talk to your doctor, nurse or pharmacist before following any medical regimen to see if it is safe and effective for you  Having Your Baby: The Labor Process   AMBULATORY CARE:   The labor process  is a series of 3 stages that your body goes through to deliver your baby  It is not known for sure what causes labor to begin  Hormones made by you and your baby and changes in your uterus may help labor to start  Labor usually starts 2 weeks before or after your due date  Most women do not have their baby exactly on their due date  Call your obstetrician if:   · You have vaginal spotting or bleeding      · Your water broke or you feel warm water gushing or trickling from your vagina  · You have more than 5 contractions in 1 hour  · You have bloody mucus or show  · You notice any changes in your baby's movements  · You have abdominal cramps, pressure, or tightening  · You have a change in vaginal discharge  · You have questions or concerns about your condition or care  First stage of labor: The first stage of labor includes latent (early) labor and active labor  This stage may last up to 12 hours if this is your first pregnancy  It may last up to 10 hours if you delivered a baby before  Your uterus will contract to prepare your cervix for delivery and to push your baby out of the birth canal  Your cervix will dilate (widen) and efface (soften and become thinner)  Your contractions may last from 30 to 60 seconds  The contractions usually start in the back and move to the front  You may also have a pink, clear, or slightly bloody discharge called bloody show  Bloody show is caused by the movement of a mucus plug from your cervix  During pregnancy the mucus plug blocks your cervix to prevent it from opening  What to do during early labor:  Early labor may last for several hours  You will most likely be at home during early labor  Rest as much as possible while you are at home  Have someone rub your back  It may be helpful to place ice packs on your lower back  Go for a short walk if you are able  Drink water and suck on ice chips  Ask your healthcare provider if it is okay to eat during early labor  How to know when you are in active labor: This stage may last up to 12 hours if this is your first pregnancy  It may last up to 10 hours if you delivered a baby before  Your contractions will get stronger, last longer, and happen more frequently  They will also become more intense and painful  Time your contractions from the beginning of one to the beginning of the next  Write this information down for 1 hour  Your healthcare provider will tell you when to go to the hospital or birthing center  This will be based on how many minutes apart your contractions are  Second stage of labor:  The second stage is the time between full cervix dilation and the birth of your baby  Your cervix will be completely dilated to 10 centimeters and your baby will be ready to be born  The second stage usually lasts 20 minutes to 2 hours  It may last up to 3 hours if this is your first baby  · You may be given antibiotics to fight a bacterial infection you have or prevent an infection during delivery  · Healthcare providers will help you find a position for giving birth that is comfortable for you  You can lie on your back, have your feet up in stirrups, or squat  · You may feel pressure on your rectum and the urge to push  This pressure is caused by the movement of your baby's head down the birth canal  Your healthcare provider will have you push when you feel the urge  He or she will guide your baby out of the birth canal  Forceps or suction may be used to help deliver your baby  You may also need an episiotomy (incision) to make the vaginal opening larger  This will make more room for your baby  Your perineum will be protected during delivery  This may be with a warm compress or massage of the area  · At least 1 minute after your baby is born, your healthcare provider will put clamps on the umbilical cord  The cord will then be cut  Your baby may be placed on your chest right away  He or she may also start breastfeeding  Third stage of labor:  The placenta (afterbirth) is delivered during this stage  After you give birth, your uterus will continue to contract to help push out the placenta  These contractions will begin 5 to 30 minutes after you give birth  Your healthcare provider will tell you when to push  You may have chills or shakiness during this stage   You may be given medicine to help prevent heavy bleeding that can happen during this stage  How to manage labor pain:  Pain can be managed naturally or with medicines  You can naturally manage pain by using relaxation methods and controlled breathing  There are different types of medicines that can be used to relieve pain while you are in labor  These medicines may be given through an IV or an epidural (thin catheter in your lower back)  Talk with your healthcare about your options for pain medicines if you choose to use them  Tell your provider if you prefer not to have any pain control medicines during labor  © Copyright [facility] 2022 Information is for End User's use only and may not be sold, redistributed or otherwise used for commercial purposes  All illustrations and images included in CareNotes® are the copyrighted property of [employer]  or [facility] [employer]   The above information is an  only  It is not intended as medical advice for individual conditions or treatments  Talk to your doctor, nurse or pharmacist before following any medical regimen to see if it is safe and effective for you

## 2022-02-28 NOTE — ASSESSMENT & PLAN NOTE
Shawn Sneed is a 31 y o    38w4d who presents for routine PNV  Not interested at this time in induction, would hope for spontaneous labor   Declines cervical check today   28 week labs reviewed:   TWG 15 kg (33 lb)   Denies OB complaints  Good fetal movement  Denies contractions, cramping, leakage of fluid or vaginal bleeding  Reviewed FKCs     Advised to continue Perineal massage  Pregnancy Essential guide and Baby and Me web site recommended

## 2022-03-02 ENCOUNTER — ROUTINE PRENATAL (OUTPATIENT)
Dept: OBGYN CLINIC | Facility: CLINIC | Age: 27
End: 2022-03-02

## 2022-03-02 VITALS
DIASTOLIC BLOOD PRESSURE: 70 MMHG | WEIGHT: 150 LBS | BODY MASS INDEX: 29.45 KG/M2 | HEIGHT: 60 IN | SYSTOLIC BLOOD PRESSURE: 122 MMHG

## 2022-03-02 DIAGNOSIS — Z34.03 SUPERVISION OF NORMAL FIRST PREGNANCY IN THIRD TRIMESTER: Primary | ICD-10-CM

## 2022-03-02 PROCEDURE — PNV: Performed by: OBSTETRICS & GYNECOLOGY

## 2022-03-02 NOTE — PROGRESS NOTES
Patient is here for her routine prenatal she is 38 week 6 days   Patient is declining her T-Dap   Patient reports that positive fetal movements with no lost of fluids and no contractions   Patient is declining her vaginal examination at today's visit   Patient reports that she is doing very well with the pregnancy

## 2022-03-06 NOTE — PATIENT INSTRUCTIONS
Pregnancy at 44 to 40 Weeks   AMBULATORY CARE:   Changes happening with your body: You are now getting close to your due date  Your due date is just an estimate of when your baby will be born  Your baby may be born before or after your due date  Your breathing may be easier if your baby has moved down into a head-down position  You may need to urinate more often because the baby may be pressing on your bladder  You may also feel more discomfort and tire easily  You may also be having trouble sleeping  Seek care immediately if:   · You develop a severe headache that does not go away  · You have new or increased vision changes, such as blurred or spotted vision  · You have new or increased swelling in your face or hands  · You have vaginal spotting or bleeding  · Your water broke or you feel warm water gushing or trickling from your vagina  Call your obstetrician if:   · You have more than 5 contractions in 1 hour  · You notice any changes in your baby's movements  · You have abdominal cramps, pressure, or tightening  · You have a change in vaginal discharge  · You have chills or a fever  · You have vaginal itching, burning, or pain  · You have yellow, green, white, or foul-smelling vaginal discharge  · You have pain or burning when you urinate, less urine than usual, or pink or bloody urine  · You have questions or concerns about your condition or care  How to care for yourself at this stage of your pregnancy:       · Eat a variety of healthy foods  Healthy foods include fruits, vegetables, whole-grain breads, low-fat dairy foods, beans, lean meats, and fish  Drink liquids as directed  Ask how much liquid to drink each day and which liquids are best for you  Limit caffeine to less than 200 milligrams each day  Limit your intake of fish to 2 servings each week  Choose fish low in mercury such as canned light tuna, shrimp, salmon, cod, or tilapia   Do not  eat fish high in mercury such as swordfish, tilefish, jorje mackerel, and shark  · Take prenatal vitamins as directed  Your need for certain vitamins and minerals, such as folic acid, increases during pregnancy  Prenatal vitamins provide some of the extra vitamins and minerals you need  Prenatal vitamins may also help to decrease the risk of certain birth defects  · Rest as needed  Put your feet up if you have swelling in your ankles and feet  · Talk to your healthcare provider about exercise  Moderate exercise can help you stay fit  Your healthcare provider will help you plan an exercise program that is safe for you during pregnancy  · Do not smoke  Smoking increases your risk of a miscarriage and other health problems during your pregnancy  Smoking can cause your baby to be born early or weigh less at birth  Ask your healthcare provider for information if you need help quitting  · Do not drink alcohol  Alcohol passes from your body to your baby through the placenta  It can affect your baby's brain development and cause fetal alcohol syndrome (FAS)  FAS is a group of conditions that causes mental, behavior, and growth problems  · Talk to your healthcare provider before you take any medicines  Many medicines may harm your baby if you take them when you are pregnant  Do not take any medicines, vitamins, herbs, or supplements without first talking to your healthcare provider  Never use illegal or street drugs (such as marijuana or cocaine) while you are pregnant  Safety tips during pregnancy:   · Avoid hot tubs and saunas  Do not use a hot tub or sauna while you are pregnant, especially during your first trimester  Hot tubs and saunas may raise your baby's temperature and increase the risk of birth defects  · Avoid toxoplasmosis  This is an infection caused by eating raw meat or being around infected cat feces  It can cause birth defects, miscarriages, and other problems   Wash your hands after you touch raw meat  Make sure any meat is well-cooked before you eat it  Avoid raw eggs and unpasteurized milk  Use gloves or ask someone else to clean your cat's litter box while you are pregnant  · Ask your healthcare provider about travel  The most comfortable time to travel is during the second trimester  Ask your provider if you can travel after 36 weeks  You may not be able to travel in an airplane after 36 weeks  He or she may also recommend that you avoid long road trips  Changes happening with your baby: Your baby is ready to be born  At birth, your baby may weigh about 6 to 9 pounds and be about 19 to 21 inches long  Your baby may be in a head-down position  Your baby will also rest lower in your abdomen  What you need to know about prenatal care: Your healthcare provider will check your blood pressure and weight  You may also need the following:  · A urine test  may also be done to check for sugar and protein  These can be signs of gestational diabetes or infection  Protein in your urine may also be a sign of preeclampsia  Preeclampsia is a condition that can develop during week 20 or later of your pregnancy  It causes high blood pressure, and it can cause problems with your kidneys and other organs  · A gestational diabetes screen  may be done  Your healthcare provider may order either a 1-step or 2-step oral glucose tolerance test (OGTT)  ? 1-step OGTT:  Your blood sugar level will be tested after you have not eaten for 8 hours (fasting)  You will then be given a glucose drink  Your level will be tested again 1 hour and 2 hours after you finish the drink  ? 2-step OGTT:  You do not have to fast for the first part of the test  You will have the glucose drink at any time of day  Your blood sugar level will be checked 1 hour later  If your blood sugar is higher than a certain level, another test will be ordered  You will fast and your blood sugar level will be tested  You will have the glucose drink  Your blood will be tested again 1 hour, 2 hours, and 3 hours after you finish the glucose drink  · Your baby's heart rate  will be checked  Follow up with your obstetrician as directed:  Write down your questions so you remember to ask them during your visits  © [employer] 2022 Information is for End User's use only and may not be sold, redistributed or otherwise used for commercial purposes  All illustrations and images included in CareNotes® are the copyrighted property of A D A M , Inc  or [facility] [place][note 2] Tanvi Villalobos   The above information is an  only  It is not intended as medical advice for individual conditions or treatments  Talk to your doctor, nurse or pharmacist before following any medical regimen to see if it is safe and effective for you  Early Labor Signs   AMBULATORY CARE:   Early labor signs and symptoms  are the changes in your body that signal your baby is getting ready to be delivered  Early labor signs can happen weeks, days or hours before delivery  Call 911 for any of the following:   · You have heavy vaginal bleeding  · You cannot get to the hospital before the baby starts to come out  Seek care immediately if:   · You have regular, painful contractions that are less than 5 minutes apart and last 30 to 70 seconds each  · You have a constant trickle or sudden gush of clear fluid from your vagina  · You notice a sudden decrease in your baby's movement  Contact your obstetrician or healthcare provider if:   · You have pain in your lower back or abdomen that does not get better when you change positions  · You have bloody mucus or show  · You have questions or concerns about your condition or care  Early labor signs and symptoms:   · Lightening  occurs when your baby drops inside your pelvis  You may feel increased pressure in your pelvis   This may happen a few weeks to a few hours before your labor begins  · Contractions  are cramps and tightening that occur in your uterus to help move the baby through your birth canal  Contractions occur regularly and more often each time  Each one lasts about 30 to 70 seconds, and gets stronger until you deliver your baby  Contractions do not go away with movement  The pain usually starts in your lower back and moves to your abdomen  · Effacement  occurs when your cervix softens and thins, so it can easily open for the baby  You will not be able to feel effacement  Your healthcare provider will examine your cervix for effacement  · Dilation  is widening of your cervix  Your healthcare provider will examine your cervix for dilation  Your cervix may start to dilate weeks before your baby is delivered  Your cervix will be fully opened and ready for delivery when it is dilated to 10 centimeters  · Increased discharge  from your vagina may occur  It may be brown, pink, clear, or slightly bloody  This discharge may also be called bloody show  Bloody show is a mucus plug that forms and blocks your cervix during pregnancy  The discharge may mean that your cervix is opening up and getting ready for delivery  · Rupture of membranes  is a sudden release of clear fluid from your vagina  Ruptured membranes means your water broke  Your healthcare provider may need to break your water if it does not happen on its own  False labor: You may have false labor signs, which are also called [place] contractions  False labor is common and may happen several weeks or days before your actual labor  The contractions are not regular, and do not get closer together  The pain is usually mild, does not worsen, and is felt only in front   [place] contractions may happen later in the day, and stop after you change position, walk, or rest   Follow up with your obstetrician or healthcare provider as directed:  Write down your questions so you remember to ask them during your visit   © [address] [place] [address] 2022 Information is for End User's use only and may not be sold, redistributed or otherwise used for commercial purposes  All illustrations and images included in CareNotes® are the copyrighted property of A D A M , Inc  or Pat Reyes  The above information is an  only  It is not intended as medical advice for individual conditions or treatments  Talk to your doctor, nurse or pharmacist before following any medical regimen to see if it is safe and effective for you  Having Your Baby: The Labor Process   AMBULATORY CARE:   The labor process  is a series of 3 stages that your body goes through to deliver your baby  It is not known for sure what causes labor to begin  Hormones made by you and your baby and changes in your uterus may help labor to start  Labor usually starts 2 weeks before or after your due date  Most women do not have their baby exactly on their due date  Call your obstetrician if:   · You have vaginal spotting or bleeding  · Your water broke or you feel warm water gushing or trickling from your vagina  · You have more than 5 contractions in 1 hour  · You have bloody mucus or show  · You notice any changes in your baby's movements  · You have abdominal cramps, pressure, or tightening  · You have a change in vaginal discharge  · You have questions or concerns about your condition or care  First stage of labor: The first stage of labor includes latent (early) labor and active labor  This stage may last up to 12 hours if this is your first pregnancy  It may last up to 10 hours if you delivered a baby before  Your uterus will contract to prepare your cervix for delivery and to push your baby out of the birth canal  Your cervix will dilate (widen) and efface (soften and become thinner)  Your contractions may last from 30 to 60 seconds  The contractions usually start in the back and move to the front   You may also have a pink, clear, or slightly bloody discharge called bloody show  Bloody show is caused by the movement of a mucus plug from your cervix  During pregnancy the mucus plug blocks your cervix to prevent it from opening  What to do during early labor:  Early labor may last for several hours  You will most likely be at home during early labor  Rest as much as possible while you are at home  Have someone rub your back  It may be helpful to place ice packs on your lower back  Go for a short walk if you are able  Drink water and suck on ice chips  Ask your healthcare provider if it is okay to eat during early labor  How to know when you are in active labor: This stage may last up to 12 hours if this is your first pregnancy  It may last up to 10 hours if you delivered a baby before  Your contractions will get stronger, last longer, and happen more frequently  They will also become more intense and painful  Time your contractions from the beginning of one to the beginning of the next  Write this information down for 1 hour  Your healthcare provider will tell you when to go to the hospital or birthing center  This will be based on how many minutes apart your contractions are  Second stage of labor:  The second stage is the time between full cervix dilation and the birth of your baby  Your cervix will be completely dilated to 10 centimeters and your baby will be ready to be born  The second stage usually lasts 20 minutes to 2 hours  It may last up to 3 hours if this is your first baby  · You may be given antibiotics to fight a bacterial infection you have or prevent an infection during delivery  · Healthcare providers will help you find a position for giving birth that is comfortable for you  You can lie on your back, have your feet up in stirrups, or squat  · You may feel pressure on your rectum and the urge to push   This pressure is caused by the movement of your baby's head down the birth canal  Your healthcare provider will have you push when you feel the urge  He or she will guide your baby out of the birth canal  Forceps or suction may be used to help deliver your baby  You may also need an episiotomy (incision) to make the vaginal opening larger  This will make more room for your baby  Your perineum will be protected during delivery  This may be with a warm compress or massage of the area  · At least 1 minute after your baby is born, your healthcare provider will put clamps on the umbilical cord  The cord will then be cut  Your baby may be placed on your chest right away  He or she may also start breastfeeding  Third stage of labor:  The placenta (afterbirth) is delivered during this stage  After you give birth, your uterus will continue to contract to help push out the placenta  These contractions will begin 5 to 30 minutes after you give birth  Your healthcare provider will tell you when to push  You may have chills or shakiness during this stage  You may be given medicine to help prevent heavy bleeding that can happen during this stage  How to manage labor pain:  Pain can be managed naturally or with medicines  You can naturally manage pain by using relaxation methods and controlled breathing  There are different types of medicines that can be used to relieve pain while you are in labor  These medicines may be given through an IV or an epidural (thin catheter in your lower back)  Talk with your healthcare about your options for pain medicines if you choose to use them  Tell your provider if you prefer not to have any pain control medicines during labor  © Copyright [employer] [facility] 2022 Information is for End User's use only and may not be sold, redistributed or otherwise used for commercial purposes  All illustrations and images included in CareNotes® are the copyrighted property of [employer]  or Pat Reyes  The above information is an  only   It is not intended as medical advice for individual conditions or treatments  Talk to your doctor, nurse or pharmacist before following any medical regimen to see if it is safe and effective for you

## 2022-03-06 NOTE — ASSESSMENT & PLAN NOTE
Teena Mckinley is a 31 y o    39w3d who presents for routine PNV  28 week labs reviewed:   TWG 16 1 kg (35 lb 6 4 oz)   Denies OB complaints    Denies contractions, cramping, leakage of fluid or vaginal bleeding  When auscultating FHT decreased heart rate noted that rebounded quickly to normal, pt reports this afternoon the baby has not been as active as normal  Pt sent to L& D for further evaluation  L& D called and Dr Juice Dumont made aware  Reviewed  labor precautions and FKCs     Advised to start Perineal massage at 34-36 weeks   Pregnancy Essential guide and Baby and Me web site recommended

## 2022-03-08 ENCOUNTER — ANESTHESIA EVENT (INPATIENT)
Dept: LABOR AND DELIVERY | Facility: HOSPITAL | Age: 27
End: 2022-03-08
Payer: COMMERCIAL

## 2022-03-08 ENCOUNTER — ANESTHESIA (INPATIENT)
Dept: LABOR AND DELIVERY | Facility: HOSPITAL | Age: 27
End: 2022-03-08
Payer: COMMERCIAL

## 2022-03-08 ENCOUNTER — HOSPITAL ENCOUNTER (INPATIENT)
Facility: HOSPITAL | Age: 27
LOS: 3 days | Discharge: HOME | End: 2022-03-11
Attending: STUDENT IN AN ORGANIZED HEALTH CARE EDUCATION/TRAINING PROGRAM | Admitting: STUDENT IN AN ORGANIZED HEALTH CARE EDUCATION/TRAINING PROGRAM
Payer: COMMERCIAL

## 2022-03-08 ENCOUNTER — ROUTINE PRENATAL (OUTPATIENT)
Dept: OBGYN CLINIC | Facility: CLINIC | Age: 27
End: 2022-03-08

## 2022-03-08 VITALS
HEIGHT: 60 IN | DIASTOLIC BLOOD PRESSURE: 68 MMHG | SYSTOLIC BLOOD PRESSURE: 110 MMHG | BODY MASS INDEX: 29.92 KG/M2 | WEIGHT: 152.4 LBS

## 2022-03-08 DIAGNOSIS — Z3A.39 39 WEEKS GESTATION OF PREGNANCY: ICD-10-CM

## 2022-03-08 DIAGNOSIS — Z98.891 STATUS POST PRIMARY LOW TRANSVERSE CESAREAN SECTION: Primary | ICD-10-CM

## 2022-03-08 DIAGNOSIS — Z30.011 ENCOUNTER FOR INITIAL PRESCRIPTION OF CONTRACEPTIVE PILLS: ICD-10-CM

## 2022-03-08 DIAGNOSIS — Z34.03 ENCOUNTER FOR SUPERVISION OF NORMAL FIRST PREGNANCY IN THIRD TRIMESTER: Primary | ICD-10-CM

## 2022-03-08 LAB
ABO GROUP BLD: NORMAL
BASE EXCESS BLDCOV CALC-SCNC: -2.2 MMOL/L (ref 1–9)
BASOPHILS # BLD AUTO: 0.03 THOUSANDS/ΜL (ref 0–0.1)
BASOPHILS NFR BLD AUTO: 0 % (ref 0–1)
BLD GP AB SCN SERPL QL: NEGATIVE
EOSINOPHIL # BLD AUTO: 0.06 THOUSAND/ΜL (ref 0–0.61)
EOSINOPHIL NFR BLD AUTO: 1 % (ref 0–6)
ERYTHROCYTE [DISTWIDTH] IN BLOOD BY AUTOMATED COUNT: 13.5 % (ref 11.6–15.1)
EXTERNAL GROUP B STREP ANTIGEN: POSITIVE
HCO3 BLDCOV-SCNC: 24 MMOL/L (ref 12.2–28.6)
HCT VFR BLD AUTO: 34.5 % (ref 34.8–46.1)
HGB BLD-MCNC: 11.7 G/DL (ref 11.5–15.4)
HOLD SPECIMEN: NORMAL
IMM GRANULOCYTES # BLD AUTO: 0.12 THOUSAND/UL (ref 0–0.2)
IMM GRANULOCYTES NFR BLD AUTO: 1 % (ref 0–2)
LYMPHOCYTES # BLD AUTO: 1.89 THOUSANDS/ΜL (ref 0.6–4.47)
LYMPHOCYTES NFR BLD AUTO: 19 % (ref 14–44)
MCH RBC QN AUTO: 29 PG (ref 26.8–34.3)
MCHC RBC AUTO-ENTMCNC: 33.9 G/DL (ref 31.4–37.4)
MCV RBC AUTO: 85 FL (ref 82–98)
MONOCYTES # BLD AUTO: 0.81 THOUSAND/ΜL (ref 0.17–1.22)
MONOCYTES NFR BLD AUTO: 8 % (ref 4–12)
NEUTROPHILS # BLD AUTO: 7.27 THOUSANDS/ΜL (ref 1.85–7.62)
NEUTS SEG NFR BLD AUTO: 71 % (ref 43–75)
NRBC BLD AUTO-RTO: 0 /100 WBCS
OXYHGB MFR BLDCOV: 62 %
PCO2 BLDCOV: 46.3 MM HG (ref 27–43)
PH BLDCOV: 7.33 [PH] (ref 7.19–7.49)
PLATELET # BLD AUTO: 283 THOUSANDS/UL (ref 149–390)
PMV BLD AUTO: 11.2 FL (ref 8.9–12.7)
PO2 BLDCOV: 26.3 MM HG (ref 15–45)
RBC # BLD AUTO: 4.04 MILLION/UL (ref 3.81–5.12)
RH BLD: POSITIVE
SAO2 % BLDCOV: 12.7 ML/DL
SL AMB  POCT GLUCOSE, UA: NEGATIVE
SL AMB POCT URINE PROTEIN: NEGATIVE
SPECIMEN EXPIRATION DATE: NORMAL
WBC # BLD AUTO: 10.18 THOUSAND/UL (ref 4.31–10.16)

## 2022-03-08 PROCEDURE — 86900 BLOOD TYPING SEROLOGIC ABO: CPT

## 2022-03-08 PROCEDURE — NC001 PR NO CHARGE: Performed by: STUDENT IN AN ORGANIZED HEALTH CARE EDUCATION/TRAINING PROGRAM

## 2022-03-08 PROCEDURE — 86901 BLOOD TYPING SEROLOGIC RH(D): CPT

## 2022-03-08 PROCEDURE — 99214 OFFICE O/P EST MOD 30 MIN: CPT

## 2022-03-08 PROCEDURE — PNV: Performed by: OBSTETRICS & GYNECOLOGY

## 2022-03-08 PROCEDURE — 88307 TISSUE EXAM BY PATHOLOGIST: CPT | Performed by: PATHOLOGY

## 2022-03-08 PROCEDURE — 85025 COMPLETE CBC W/AUTO DIFF WBC: CPT

## 2022-03-08 PROCEDURE — 59510 CESAREAN DELIVERY: CPT | Performed by: STUDENT IN AN ORGANIZED HEALTH CARE EDUCATION/TRAINING PROGRAM

## 2022-03-08 PROCEDURE — 59412 ANTEPARTUM MANIPULATION: CPT | Performed by: STUDENT IN AN ORGANIZED HEALTH CARE EDUCATION/TRAINING PROGRAM

## 2022-03-08 PROCEDURE — 86592 SYPHILIS TEST NON-TREP QUAL: CPT

## 2022-03-08 PROCEDURE — 82805 BLOOD GASES W/O2 SATURATION: CPT | Performed by: STUDENT IN AN ORGANIZED HEALTH CARE EDUCATION/TRAINING PROGRAM

## 2022-03-08 PROCEDURE — 4A1HXCZ MONITORING OF PRODUCTS OF CONCEPTION, CARDIAC RATE, EXTERNAL APPROACH: ICD-10-PCS | Performed by: STUDENT IN AN ORGANIZED HEALTH CARE EDUCATION/TRAINING PROGRAM

## 2022-03-08 PROCEDURE — 86850 RBC ANTIBODY SCREEN: CPT

## 2022-03-08 RX ORDER — KETOROLAC TROMETHAMINE 30 MG/ML
30 INJECTION, SOLUTION INTRAMUSCULAR; INTRAVENOUS EVERY 6 HOURS SCHEDULED
Status: DISPENSED | OUTPATIENT
Start: 2022-03-09 | End: 2022-03-10

## 2022-03-08 RX ORDER — KETOROLAC TROMETHAMINE 30 MG/ML
INJECTION, SOLUTION INTRAMUSCULAR; INTRAVENOUS AS NEEDED
Status: DISCONTINUED | OUTPATIENT
Start: 2022-03-08 | End: 2022-03-08

## 2022-03-08 RX ORDER — SODIUM CHLORIDE, SODIUM LACTATE, POTASSIUM CHLORIDE, CALCIUM CHLORIDE 600; 310; 30; 20 MG/100ML; MG/100ML; MG/100ML; MG/100ML
INJECTION, SOLUTION INTRAVENOUS CONTINUOUS PRN
Status: DISCONTINUED | OUTPATIENT
Start: 2022-03-08 | End: 2022-03-08

## 2022-03-08 RX ORDER — LIDOCAINE HYDROCHLORIDE AND EPINEPHRINE 15; 5 MG/ML; UG/ML
INJECTION, SOLUTION EPIDURAL
Status: COMPLETED | OUTPATIENT
Start: 2022-03-08 | End: 2022-03-08

## 2022-03-08 RX ORDER — ONDANSETRON 2 MG/ML
INJECTION INTRAMUSCULAR; INTRAVENOUS AS NEEDED
Status: DISCONTINUED | OUTPATIENT
Start: 2022-03-08 | End: 2022-03-08

## 2022-03-08 RX ORDER — SODIUM CHLORIDE, SODIUM LACTATE, POTASSIUM CHLORIDE, CALCIUM CHLORIDE 600; 310; 30; 20 MG/100ML; MG/100ML; MG/100ML; MG/100ML
125 INJECTION, SOLUTION INTRAVENOUS CONTINUOUS
Status: DISCONTINUED | OUTPATIENT
Start: 2022-03-08 | End: 2022-03-09

## 2022-03-08 RX ORDER — OXYTOCIN/RINGER'S LACTATE 30/500 ML
PLASTIC BAG, INJECTION (ML) INTRAVENOUS CONTINUOUS PRN
Status: DISCONTINUED | OUTPATIENT
Start: 2022-03-08 | End: 2022-03-08

## 2022-03-08 RX ORDER — ONDANSETRON 2 MG/ML
4 INJECTION INTRAMUSCULAR; INTRAVENOUS EVERY 8 HOURS PRN
Status: DISCONTINUED | OUTPATIENT
Start: 2022-03-08 | End: 2022-03-11 | Stop reason: HOSPADM

## 2022-03-08 RX ORDER — ONDANSETRON 2 MG/ML
4 INJECTION INTRAMUSCULAR; INTRAVENOUS EVERY 6 HOURS PRN
Status: DISCONTINUED | OUTPATIENT
Start: 2022-03-08 | End: 2022-03-09

## 2022-03-08 RX ORDER — OXYCODONE HYDROCHLORIDE 5 MG/1
5 TABLET ORAL EVERY 4 HOURS PRN
Status: DISCONTINUED | OUTPATIENT
Start: 2022-03-08 | End: 2022-03-11 | Stop reason: HOSPADM

## 2022-03-08 RX ORDER — IBUPROFEN 600 MG/1
600 TABLET ORAL EVERY 6 HOURS
Status: DISCONTINUED | OUTPATIENT
Start: 2022-03-10 | End: 2022-03-11 | Stop reason: HOSPADM

## 2022-03-08 RX ORDER — ACETAMINOPHEN 325 MG/1
650 TABLET ORAL EVERY 6 HOURS PRN
Status: DISCONTINUED | OUTPATIENT
Start: 2022-03-08 | End: 2022-03-09

## 2022-03-08 RX ORDER — FENTANYL CITRATE 50 UG/ML
INJECTION, SOLUTION INTRAMUSCULAR; INTRAVENOUS AS NEEDED
Status: DISCONTINUED | OUTPATIENT
Start: 2022-03-08 | End: 2022-03-08

## 2022-03-08 RX ORDER — METOCLOPRAMIDE HYDROCHLORIDE 5 MG/ML
10 INJECTION INTRAMUSCULAR; INTRAVENOUS ONCE AS NEEDED
Status: DISCONTINUED | OUTPATIENT
Start: 2022-03-08 | End: 2022-03-11 | Stop reason: HOSPADM

## 2022-03-08 RX ORDER — ONDANSETRON 2 MG/ML
4 INJECTION INTRAMUSCULAR; INTRAVENOUS ONCE AS NEEDED
Status: DISCONTINUED | OUTPATIENT
Start: 2022-03-08 | End: 2022-03-11 | Stop reason: HOSPADM

## 2022-03-08 RX ORDER — METOCLOPRAMIDE HYDROCHLORIDE 5 MG/ML
10 INJECTION INTRAMUSCULAR; INTRAVENOUS EVERY 6 HOURS PRN
Status: DISCONTINUED | OUTPATIENT
Start: 2022-03-08 | End: 2022-03-11 | Stop reason: HOSPADM

## 2022-03-08 RX ORDER — CEFAZOLIN SODIUM 2 G/50ML
SOLUTION INTRAVENOUS AS NEEDED
Status: DISCONTINUED | OUTPATIENT
Start: 2022-03-08 | End: 2022-03-08

## 2022-03-08 RX ORDER — HYDROMORPHONE HCL/PF 1 MG/ML
0.5 SYRINGE (ML) INJECTION
Status: DISCONTINUED | OUTPATIENT
Start: 2022-03-08 | End: 2022-03-11 | Stop reason: HOSPADM

## 2022-03-08 RX ORDER — CALCIUM CARBONATE 200(500)MG
1000 TABLET,CHEWABLE ORAL DAILY PRN
Status: DISCONTINUED | OUTPATIENT
Start: 2022-03-08 | End: 2022-03-09

## 2022-03-08 RX ORDER — CHLOROPROCAINE HYDROCHLORIDE 30 MG/ML
INJECTION, SOLUTION EPIDURAL; INFILTRATION; INTRACAUDAL; PERINEURAL AS NEEDED
Status: DISCONTINUED | OUTPATIENT
Start: 2022-03-08 | End: 2022-03-08

## 2022-03-08 RX ORDER — DEXAMETHASONE SODIUM PHOSPHATE 10 MG/ML
INJECTION, SOLUTION INTRAMUSCULAR; INTRAVENOUS AS NEEDED
Status: DISCONTINUED | OUTPATIENT
Start: 2022-03-08 | End: 2022-03-08

## 2022-03-08 RX ORDER — HYDROMORPHONE HCL/PF 1 MG/ML
0.5 SYRINGE (ML) INJECTION EVERY 2 HOUR PRN
Status: ACTIVE | OUTPATIENT
Start: 2022-03-08 | End: 2022-03-09

## 2022-03-08 RX ORDER — TERBUTALINE SULFATE 1 MG/ML
0.25 INJECTION, SOLUTION SUBCUTANEOUS ONCE
Status: COMPLETED | OUTPATIENT
Start: 2022-03-08 | End: 2022-03-08

## 2022-03-08 RX ORDER — ACETAMINOPHEN 325 MG/1
650 TABLET ORAL EVERY 6 HOURS SCHEDULED
Status: DISCONTINUED | OUTPATIENT
Start: 2022-03-09 | End: 2022-03-11 | Stop reason: HOSPADM

## 2022-03-08 RX ADMIN — CHLOROPROCAINE HYDROCHLORIDE 5 MG: 30 INJECTION, SOLUTION EPIDURAL; INFILTRATION at 20:48

## 2022-03-08 RX ADMIN — CEFAZOLIN SODIUM 2000 MG: 2 SOLUTION INTRAVENOUS at 20:58

## 2022-03-08 RX ADMIN — LIDOCAINE HYDROCHLORIDE AND EPINEPHRINE 3 ML: 15; 5 INJECTION, SOLUTION EPIDURAL at 20:50

## 2022-03-08 RX ADMIN — Medication 350 MILLI-UNITS/MIN: at 21:06

## 2022-03-08 RX ADMIN — PHENYLEPHRINE HYDROCHLORIDE 40 MCG/MIN: 10 INJECTION INTRAVENOUS at 20:37

## 2022-03-08 RX ADMIN — ONDANSETRON 4 MG: 2 INJECTION INTRAMUSCULAR; INTRAVENOUS at 21:01

## 2022-03-08 RX ADMIN — SODIUM CHLORIDE, SODIUM LACTATE, POTASSIUM CHLORIDE, AND CALCIUM CHLORIDE 999 ML/HR: .6; .31; .03; .02 INJECTION, SOLUTION INTRAVENOUS at 19:35

## 2022-03-08 RX ADMIN — CHLOROPROCAINE HYDROCHLORIDE 5 MG: 30 INJECTION, SOLUTION EPIDURAL; INFILTRATION at 20:44

## 2022-03-08 RX ADMIN — KETOROLAC TROMETHAMINE 30 MG: 30 INJECTION, SOLUTION INTRAMUSCULAR at 21:20

## 2022-03-08 RX ADMIN — HYDROMORPHONE HYDROCHLORIDE 0.5 MG: 1 INJECTION, SOLUTION INTRAMUSCULAR; INTRAVENOUS; SUBCUTANEOUS at 23:11

## 2022-03-08 RX ADMIN — SODIUM CHLORIDE 5 MILLION UNITS: 9 INJECTION, SOLUTION INTRAVENOUS at 20:10

## 2022-03-08 RX ADMIN — CHLOROPROCAINE HYDROCHLORIDE 5 MG: 30 INJECTION, SOLUTION EPIDURAL; INFILTRATION at 20:51

## 2022-03-08 RX ADMIN — HYDROMORPHONE HYDROCHLORIDE 0.5 MG: 1 INJECTION, SOLUTION INTRAMUSCULAR; INTRAVENOUS; SUBCUTANEOUS at 22:52

## 2022-03-08 RX ADMIN — TERBUTALINE SULFATE 0.25 MG: 1 INJECTION SUBCUTANEOUS at 20:51

## 2022-03-08 RX ADMIN — SODIUM CHLORIDE, SODIUM LACTATE, POTASSIUM CHLORIDE, AND CALCIUM CHLORIDE: .6; .31; .03; .02 INJECTION, SOLUTION INTRAVENOUS at 20:21

## 2022-03-08 RX ADMIN — DEXAMETHASONE SODIUM PHOSPHATE 10 MG: 10 INJECTION, SOLUTION INTRAMUSCULAR; INTRAVENOUS at 21:01

## 2022-03-08 RX ADMIN — FENTANYL CITRATE 100 MCG: 50 INJECTION, SOLUTION INTRAMUSCULAR; INTRAVENOUS at 21:05

## 2022-03-08 NOTE — PROGRESS NOTES
Patient is 39 weeks 5 days with no concerns no lost of fluids and no contractions at this time   Patient is declining her vaginal examination at today's visit ,  Patient is planning to breast feed the baby

## 2022-03-08 NOTE — PROGRESS NOTES
Problem   Encounter for Supervision of Normal First Pregnancy in Third Trimester    O+  + GBS in urine   Prenatal labs WNL    28 week labs, HGB 12 2 , abnormal 1 hr @ 141, Normal 3 hr   Declines Tdap, flu, &  COVID vaccines   28 week folder given at 30 5 week visit     BP & L&D forms returned  Delivery consent on file        Encounter for supervision of normal first pregnancy in third trimester  Puja Madison is a 31 y o    39w3d who presents for routine PNV  28 week labs reviewed:   TWG 16 1 kg (35 lb 6 4 oz)   Denies OB complaints    Denies contractions, cramping, leakage of fluid or vaginal bleeding  When auscultating FHT decreased heart rate noted that rebounded quickly to normal, pt reports this afternoon the baby has not been as active as normal  Pt sent to L& D for further evaluation  L& D called and Dr Zuly Arriola made aware  Reviewed  labor precautions and FKCs     Advised to start Perineal massage at 34-36 weeks   Pregnancy Essential guide and Baby and Me web site recommended

## 2022-03-08 NOTE — H&P
H&P Exam - Obstetrics   Akira Dickerson 31 y o  female MRN: [id number]  Unit/Bed#: LD TRIAGE  Encounter: [id number]      History of Present Illness     Chief Complaint: Breech presentation    HPI:  Akira Dickerson is a 31 y o   female with an MELODIE of 3/10/2022, by Ultrasound at 39w5d weeks gestation who is being admitted for induction of labor  Patient was seen in the office and an audible deceleration was appreciated during examination  Patient was referred to triage for further assessment  On presentation, a deceleration into 100s bpm with return to baseline 120bpm was appreciated with intermittent accelerations  On transabdominal ultrasound, fetus was found to be zuleika breech in presentation  Discussion with patient regarding benefits and risks for ECV vs 1LTCS  Decision to proceed with ECV was made and all associated parties were notified  Contractions: denies  Loss of fluid: denies  Vaginal bleeding: denies  Fetal movement: present    She is a S[facility] patient       PREGNANCY COMPLICATIONS:   1) Pregnancy at 39w5d  2) H/o congenital heart defect    OB History    Para Term  AB Living   1             SAB IAB Ectopic Multiple Live Births                  # Outcome Date GA Lbr Tyrone/2nd Weight Sex Delivery Anes PTL Lv   1 Current                Baby complications/comments: none    ROS:  Constitutional: Negative  Respiratory: Negative  Cardiovascular: Negative    Gastrointestinal: Negative    Historical Information   Past Medical History:   Diagnosis Date    Varicella     had vaccines     Past Surgical History:   Procedure Laterality Date    CARDIAC SURGERY      age 2   [facility] CHOLECYSTECTOMY  2019     Social History   Social History     Substance and Sexual Activity   Alcohol Use None    Comment: none with pregnancy     Social History     Substance and Sexual Activity   Drug Use Never    Comment: fob- marijuana in the past     Social History     Tobacco Use   Smoking Status Never Smoker   Smokeless Tobacco Never Used     Family History:   Family History   Problem Relation Age of Onset    No Known Problems Mother     No Known Problems Father     No Known Problems Sister     No Known Problems Brother     No Known Problems Maternal Grandfather     Dementia Paternal Grandmother     No Known Problems Paternal Grandfather        Meds/Allergies      Medications Prior to Admission   Medication    Prenatal Vit-Fe Fumarate-FA (PRENATAL PO)      No Known Allergies    OBJECTIVE:    Vitals: Blood pressure 109/78, pulse 96, temperature 98 4 °F (36 9 °C), temperature source Oral, resp  rate 18, last menstrual period 06/14/2021, SpO2 97 %  There is no height or weight on file to calculate BMI  Physical Exam  Vitals and nursing note reviewed  Exam conducted with a chaperone present  Constitutional:       General: She is not in acute distress  HENT:      Head: Normocephalic  Right Ear: External ear normal       Left Ear: External ear normal    Eyes:      General: No scleral icterus  Right eye: No discharge  Left eye: No discharge  Conjunctiva/sclera: Conjunctivae normal    Cardiovascular:      Rate and Rhythm: Normal rate  Pulses: Normal pulses  Pulmonary:      Effort: Pulmonary effort is normal  No respiratory distress  Abdominal:      Palpations: Abdomen is soft  Tenderness: There is no abdominal tenderness  There is no guarding  Comments: Gravid uterus   Genitourinary:     General: Normal vulva  Musculoskeletal:         General: No swelling or tenderness  Normal range of motion  Cervical back: Normal range of motion  Right lower leg: No edema  Left lower leg: No edema  Skin:     General: Skin is warm and dry  Capillary Refill: Capillary refill takes less than 2 seconds  Neurological:      Mental Status: She is alert and oriented to person, place, and time  Mental status is at baseline     Psychiatric:         Mood and Affect: Mood normal  Behavior: Behavior normal          Fetus confirmed to be zuleika breech presentation on TAUS    Cervix:   0/0/-4    Fetal heart rate:    120 bpm baseline, moderate variability, 15x15 accelerations, 1x variable deceleration    [place]:    acontractile    EFW: 41%    GBS: positive    Prenatal Labs:   Blood Type:   Lab Results   Component Value Date/Time    ABO Grouping O 10/09/2021 12:21 PM     , D (Rh type):   Lab Results   Component Value Date/Time    Rh Factor Positive 10/09/2021 12:21 PM     , Antibody Screen: No results found for: ANTIBODYSCR , HCT/HGB:   Lab Results   Component Value Date/Time    Hematocrit 36 6 12/18/2021 10:45 AM    Hemoglobin 12 2 12/18/2021 10:45 AM      , MCV:   Lab Results   Component Value Date/Time    MCV 95 12/18/2021 10:45 AM      , Platelets:   Lab Results   Component Value Date/Time    Platelets 240 01/62/2597 10:45 AM      , 1 hour Glucola:   Lab Results   Component Value Date/Time    Glucose 141 (H) 12/18/2021 10:45 AM   , 3 hour GTT:   Lab Results   Component Value Date/Time    Glucose, GTT - 3 Hour 87 01/20/2022 08:35 AM   , Varicella: No results found for: VARICELLAIGG    , Rubella:   Lab Results   Component Value Date/Time    Rubella IgG Quant >175 0 10/09/2021 12:00 PM        , VDRL/RPR:   Lab Results   Component Value Date/Time    RPR Non-Reactive 12/18/2021 10:45 AM      , Urine Culture/Screen:   Lab Results   Component Value Date/Time    Urine Culture 10,000-19,000 cfu/ml  12/18/2021 10:45 AM       , Urine Drug Screen: No results found for: AMPHETUR, BARBTUR, BDZUR, THCUR, COCAINEUR, METHADONEUR, OPIATEUR, PCPUR, MTHAMUR, ECSTASYUR, TRICYCLICSUR, Hep B:   Lab Results   Component Value Date/Time    Hepatitis B Surface Ag Non-reactive 10/09/2021 12:00 PM     , Hep C: No components found for: HEPCSAG, EXTHEPCSAG   , HIV:   Lab Results   Component Value Date/Time    HIV-1/HIV-2 Ab Non-Reactive 10/09/2021 12:00 PM     , Chlamydia: No results found for: EXTCHLAMYDIA  , Gonorrhea: Lab Results   Component Value Date/Time    N gonorrhoeae, DNA Probe Negative 2021 04:04 PM     , Group B Strep:  positive    Invasive Devices  Report    None                   Assessment/Plan     ASSESSMENT:  26yo  at 39w5d weeks gestation who is being admitted for ECV and possible 1LTCS vs  Induction of labor  PLAN:    - Admit  - CBC, RPR, Blood Type  - GBS positive status:  PCN for prophylaxis   - Analgesia and/or epidural at patient request  - To OR for external cephalic version    Discussed with Dr Navjot Campbell      This patient will be an INPATIENT  and I certify the anticipated length of stay is >2 Midnights      Naun Hill MD  3/8/2022  6:49 PM

## 2022-03-09 PROBLEM — Z98.891 STATUS POST PRIMARY LOW TRANSVERSE CESAREAN SECTION: Status: ACTIVE | Noted: 2022-03-08

## 2022-03-09 LAB
BASOPHILS # BLD AUTO: 0.04 THOUSANDS/ΜL (ref 0–0.1)
BASOPHILS NFR BLD AUTO: 0 % (ref 0–1)
EOSINOPHIL # BLD AUTO: 0.03 THOUSAND/ΜL (ref 0–0.61)
EOSINOPHIL NFR BLD AUTO: 0 % (ref 0–6)
ERYTHROCYTE [DISTWIDTH] IN BLOOD BY AUTOMATED COUNT: 13.6 % (ref 11.6–15.1)
HCT VFR BLD AUTO: 29.9 % (ref 34.8–46.1)
HGB BLD-MCNC: 10.3 G/DL (ref 11.5–15.4)
IMM GRANULOCYTES # BLD AUTO: 0.1 THOUSAND/UL (ref 0–0.2)
IMM GRANULOCYTES NFR BLD AUTO: 1 % (ref 0–2)
LYMPHOCYTES # BLD AUTO: 2.2 THOUSANDS/ΜL (ref 0.6–4.47)
LYMPHOCYTES NFR BLD AUTO: 13 % (ref 14–44)
MCH RBC QN AUTO: 29.3 PG (ref 26.8–34.3)
MCHC RBC AUTO-ENTMCNC: 34.4 G/DL (ref 31.4–37.4)
MCV RBC AUTO: 85 FL (ref 82–98)
MONOCYTES # BLD AUTO: 1.74 THOUSAND/ΜL (ref 0.17–1.22)
MONOCYTES NFR BLD AUTO: 10 % (ref 4–12)
NEUTROPHILS # BLD AUTO: 13.47 THOUSANDS/ΜL (ref 1.85–7.62)
NEUTS SEG NFR BLD AUTO: 76 % (ref 43–75)
NRBC BLD AUTO-RTO: 0 /100 WBCS
PLATELET # BLD AUTO: 274 THOUSANDS/UL (ref 149–390)
PMV BLD AUTO: 10.9 FL (ref 8.9–12.7)
RBC # BLD AUTO: 3.51 MILLION/UL (ref 3.81–5.12)
RPR SER QL: NORMAL
WBC # BLD AUTO: 17.58 THOUSAND/UL (ref 4.31–10.16)

## 2022-03-09 PROCEDURE — 99024 POSTOP FOLLOW-UP VISIT: CPT | Performed by: OBSTETRICS & GYNECOLOGY

## 2022-03-09 PROCEDURE — 85025 COMPLETE CBC W/AUTO DIFF WBC: CPT | Performed by: OBSTETRICS & GYNECOLOGY

## 2022-03-09 RX ORDER — OXYTOCIN/RINGER'S LACTATE 30/500 ML
62.5 PLASTIC BAG, INJECTION (ML) INTRAVENOUS ONCE
Status: DISCONTINUED | OUTPATIENT
Start: 2022-03-09 | End: 2022-03-11 | Stop reason: HOSPADM

## 2022-03-09 RX ORDER — DOCUSATE SODIUM 100 MG/1
100 CAPSULE, LIQUID FILLED ORAL 2 TIMES DAILY
Status: DISCONTINUED | OUTPATIENT
Start: 2022-03-09 | End: 2022-03-11 | Stop reason: HOSPADM

## 2022-03-09 RX ORDER — DIPHENHYDRAMINE HCL 25 MG
25 TABLET ORAL EVERY 6 HOURS PRN
Status: DISCONTINUED | OUTPATIENT
Start: 2022-03-09 | End: 2022-03-11 | Stop reason: HOSPADM

## 2022-03-09 RX ORDER — SIMETHICONE 80 MG
80 TABLET,CHEWABLE ORAL 4 TIMES DAILY PRN
Status: DISCONTINUED | OUTPATIENT
Start: 2022-03-09 | End: 2022-03-11 | Stop reason: HOSPADM

## 2022-03-09 RX ORDER — OXYCODONE HYDROCHLORIDE 5 MG/1
5 TABLET ORAL EVERY 4 HOURS PRN
Status: DISCONTINUED | OUTPATIENT
Start: 2022-03-09 | End: 2022-03-11 | Stop reason: HOSPADM

## 2022-03-09 RX ORDER — ONDANSETRON 2 MG/ML
4 INJECTION INTRAMUSCULAR; INTRAVENOUS EVERY 8 HOURS PRN
Status: DISCONTINUED | OUTPATIENT
Start: 2022-03-09 | End: 2022-03-11 | Stop reason: HOSPADM

## 2022-03-09 RX ORDER — OXYCODONE HYDROCHLORIDE 5 MG/1
10 TABLET ORAL EVERY 4 HOURS PRN
Status: DISCONTINUED | OUTPATIENT
Start: 2022-03-09 | End: 2022-03-11 | Stop reason: HOSPADM

## 2022-03-09 RX ORDER — ACETAMINOPHEN 325 MG/1
650 TABLET ORAL EVERY 4 HOURS PRN
Status: DISCONTINUED | OUTPATIENT
Start: 2022-03-09 | End: 2022-03-11 | Stop reason: HOSPADM

## 2022-03-09 RX ORDER — CALCIUM CARBONATE 200(500)MG
1000 TABLET,CHEWABLE ORAL 2 TIMES DAILY PRN
Status: DISCONTINUED | OUTPATIENT
Start: 2022-03-09 | End: 2022-03-11 | Stop reason: HOSPADM

## 2022-03-09 RX ORDER — DIAPER,BRIEF,INFANT-TODD,DISP
1 EACH MISCELLANEOUS DAILY PRN
Status: DISCONTINUED | OUTPATIENT
Start: 2022-03-09 | End: 2022-03-11 | Stop reason: HOSPADM

## 2022-03-09 RX ADMIN — SIMETHICONE 80 MG: 80 TABLET, CHEWABLE ORAL at 18:33

## 2022-03-09 RX ADMIN — KETOROLAC TROMETHAMINE 30 MG: 30 INJECTION, SOLUTION INTRAMUSCULAR at 06:19

## 2022-03-09 RX ADMIN — KETOROLAC TROMETHAMINE 30 MG: 30 INJECTION, SOLUTION INTRAMUSCULAR at 13:12

## 2022-03-09 RX ADMIN — ACETAMINOPHEN 650 MG: 325 TABLET, FILM COATED ORAL at 19:17

## 2022-03-09 RX ADMIN — ACETAMINOPHEN 650 MG: 325 TABLET, FILM COATED ORAL at 13:14

## 2022-03-09 RX ADMIN — KETOROLAC TROMETHAMINE 30 MG: 30 INJECTION, SOLUTION INTRAMUSCULAR at 18:33

## 2022-03-09 RX ADMIN — Medication 1 TABLET: at 08:26

## 2022-03-09 RX ADMIN — DOCUSATE SODIUM 100 MG: 100 CAPSULE ORAL at 18:32

## 2022-03-09 RX ADMIN — OXYCODONE HYDROCHLORIDE 5 MG: 5 TABLET ORAL at 08:26

## 2022-03-09 RX ADMIN — ACETAMINOPHEN 650 MG: 325 TABLET, FILM COATED ORAL at 03:14

## 2022-03-09 NOTE — LACTATION NOTE
This note was copied from a baby's chart  CONSULT - LACTATION  Baby Boy Mckinley Haynes) Sissy Metcalf 1 days male MRN: [id number]    [address] Room / Bed: (N)/ 322(N) Encounter: [id number]    Maternal Information     MOTHER:  Nessa Calabrese  Maternal Age: 31 y o    OB History: # 1 - Date: None, Sex: None, Weight: None, GA: None, Delivery: None, Apgar1: None, Apgar5: None, Living: None, Birth Comments: None   Previouse breast reduction surgery? No    Lactation history:   Has patient previously breast fed: No   How long had patient previously breast fed:     Previous breast feeding complications:       Past Surgical History:   Procedure Laterality Date    CARDIAC SURGERY      age 2   [facility] CHOLECYSTECTOMY          Birth information:  YOB: 2023   Time of birth: 9:02 PM   Sex: male   Delivery type: , Low Transverse   Birth Weight: 3120 g (6 lb 14 1 oz)   Percent of Weight Change: 0%     Gestational Age: 38w11d   [unfilled]    Assessment     Breast and nipple assessment: normal assessment     Assessment: suck issue (disorganized )    Feeding assessment: latch difficulty (short bursts of sucking )  LATCH:  Latch: Repeated attempts, hold nipple in mouth, stimulate to suck   Audible Swallowing: A few with stimulation   Type of Nipple: Everted (After stimulation)   Comfort (Breast/Nipple): Soft/non-tender   Hold (Positioning): Full assist, staff holds infant at breast   LATCH Score: 6          Feeding recommendations:  breast feed on demand     Met with mother  Provided mother with Ready, Set, Baby booklet  Discussed Skin to Skin contact an benefits to mom and baby  Talked about the delay of the first bath until baby has adjusted  Spoke about the benefits of rooming in  Feeding on cue and what that means for recognizing infant's hunger  Avoidance of pacifiers for the first month discussed   Talked about exclusive breastfeeding for the first 6 months  Positioning and latch reviewed as well as showing images of other feeding positions  Discussed the properties of a good latch in any position  Reviewed hand/manual expression  Discussed s/s that baby is getting enough milk and some s/s that breastfeeding dyad may need further help  Gave information on common concerns, what to expect the first few weeks after delivery, preparing for other caregivers, and how partners can help  Resources for support also provided  Information on hand expression given  Discussed benefits of knowing how to manually express breast including stimulating milk supply, softening nipple for latch and evacuating breast in the event of engorgement  Discussed 2nd night syndrome and ways to calm infant  Hand out given  Provided DC booklet at this time, enc family to review and prepare questions for day of DC  Assisted parents to place baby skin to skin in football and laid back holds  Discussed importance of alignment of baby's ear, shoulder, and hip in any preferred position  Worked on supporting baby at breast level and beginning the feed with baby's nose arriving at the nipple  Then, using areolar compression while guiding baby chin-forward to the breast to achieve a deep, comfortable latch  Baby gapes, tongue to roof of mouth  Relaxes and latches for a few sucks after jaw massage and suck training techniques  Baby is sleepy at this time, but enc Mom to continue holds as practiced and offer hand expressed colostrum throughout attempt  Mom ordered medela pump two weeks ago, enc to contact [employer] via text and follow up with lactation if needed        Sejal Luque RN 3/9/2022 5:11 PM

## 2022-03-09 NOTE — PROGRESS NOTES
Progress Note - OB/GYN   Fritz Reid 31 y o  female MRN: [id number]  Unit/Bed#: -01 Encounter: [id number]    Assessment:  POD#1 s/p primary low transverse  section for breech presentation after a failed ECV, in a stat fashion due to cat 2 tracing, stable    Plan:  1) Post-op care  Encourage ambulation   Encourage breastfeeding  Continue current meds   Hgb 11 7 --> follow up AM CBC    2) [place] catheter   UOP 0 7, to be removed this morning, follow up void trial    3) DVT ppx: SCDs    4) Disposition: anticipate discharge POD#2vs3    Subjective/Objective     Subjective:     Pain: yes, improved with meds  Tolerating PO: yes  Voiding: yes, for void trial today  Flatus: no  BM: no  Ambulating: yes  Breastfeeding: Breastfeeding  Chest pain: no  Shortness of breath: no  Leg pain: no    Objective:     Vitals:  Vitals:    22 0040 22 0140 22 0240 22 0340   BP: 93/52 97/56 90/54 92/54   BP Location: Right arm Left arm Left arm Left arm   Pulse: 95 (!) 112 101 104   Resp: 16 16 16 16   Temp: 98 9 °F (37 2 °C) 98 7 °F (37 1 °C) 98 4 °F (36 9 °C) 98 4 °F (36 9 °C)   TempSrc: Oral Oral Oral Oral   SpO2: 96% 97% 98% 98%   Weight:       Height:           Physical Exam:   GEN:appears well, alert and oriented x 3, pleasant and cooperative   CV: RRR, no m/r/g  Lungs: CTA b/l, no w/r/r  ABDOMEN: soft, moderate tenderness throughout abdomen,   Uterine fundus firm and non-tender, at the umbilicus, Incision C/D/I  EXTREMITIES: non-tender, no edema       Lab Results   Component Value Date    WBC 10 18 (H) 2022    HGB 11 7 2022    HCT 34 5 (L) 2022    MCV 85 2022     2022           Trinity Duong MD, PGY-1  Obstetrics & Gynecology  22  6:11 AM

## 2022-03-09 NOTE — ANESTHESIA PREPROCEDURE EVALUATION
Procedure:  VERSION EXTERNAL CEPHALIC (N/A Abdomen)    Relevant Problems   GYN   (+) 39 weeks gestation of pregnancy   (+) Encounter for supervision of normal first pregnancy in third trimester      NEURO/PSYCH   (+) History of congenital heart defect      History of congenital heart defect     ECHO: IMPRESSIONS:   1  Normal four chamber intracardiac anatomy   2  Normal biventricular systolic function   3  All four valves appear normal in structure and function   4  Normal origin of both left and right coronary arteries   5  No shunts identified   6  There is a small echodense structure located just adjacent to the branch pulmonary arteries that likely represents a PDA closure device  No residual PDA flow noted  No obstruction to flow in the aorta or branch pulmonary arteries  Physical Exam    Airway    Mallampati score: II  TM Distance: >3 FB  Neck ROM: full     Dental       Cardiovascular      Pulmonary      Other Findings        Anesthesia Plan  ASA Score- 2     Anesthesia Type- epidural with ASA Monitors  Additional Monitors:   Airway Plan:           Plan Factors-    Chart reviewed  Induction-     Postoperative Plan-     Informed Consent- Anesthetic plan and risks discussed with patient  I personally reviewed this patient with the CRNA  Discussed and agreed on the Anesthesia Plan with the CRNA  Mike Hicks

## 2022-03-09 NOTE — QUICK NOTE
At bedside with Malik Tim and her  to discuss external cephalic version vs primary  section for malpresentation  Reviewed at 39 weeks, risk of delivery at this gestational age vs allowing for spontaneous labor low  In setting of decreased fetal movement and spontaneous deceleration, strongly recommend moving forward with delivery  Discussed the risks of version  including fetal heart rate changes that usually stabilize when the procedure is discontinued  Although the incidence of more serious fetal complications is low, there may be a risk of PROM, placental bleeding or fetal distress that may necessitate an emergency CS  I reviewed the version procedure which includes having an IV started and getting blood work (CBC and type and screen)  Then terbutaline 0 25 mg is given to relax the uterus and the version is done under US visualization  A spinal and possibly epidural will be for relaxation of the uterus  Benefits of a version is an increased chance that the baby will be vertex for an induction  Regarding Nessa's specific case, reviewed concern for spontaneous deceleration x2, unknown cause, risk fetal distress necessitating emergent  section  Also reviewed increased risk damage to bowel, bladder, blood vessels, nerves, infection with STAT  section vs  Planned  Cervix closed, aware induction may be quite a long process  At this point she is interested in pursuing external cephalic version and induction of labor  She strongly desires a vaginal delivery

## 2022-03-09 NOTE — PROCEDURES
ECV started by Dr Paige Mejía & Dr Jordin Church (forward roll)     Patient scanned by Dr Manoj Adam for fetal positioning/heart rate  FHR reassuring at this time   ECV continued by Dr Paige Mejía & Dr Jordin Church (forward roll)      Patient scanned by Dr Manoj Adam for fetal positioning/heart rate  FHR reassuring at this time   ECV continued by Dr Paige Mejía & Dr Jordin Church (backward roll)      Patient scanned for fetal positioning, external ultrasound applied, FHR 58 at this time  Plan to proceed with STAT  section per Dr Paige Mejía

## 2022-03-09 NOTE — ANESTHESIA PROCEDURE NOTES
Epidural Block    Patient location during procedure: ED  Start time: 3/8/2022 8:52 PM  Reason for block: procedure for pain, at surgeon's request, post-op pain management and primary anesthetic  Staffing  Performed: Anesthesiologist   Anesthesiologist: Rosalino rGider MD  Resident/CRNA: Abelino Ball CRNA  Preanesthetic Checklist  Completed: patient identified, IV checked, site marked, risks and benefits discussed, surgical consent, monitors and equipment checked, pre-op evaluation and timeout performed  Epidural  Patient position: sitting  Prep: ChloraPrep  Patient monitoring: heart rate, continuous pulse ox and frequent blood pressure checks  Approach: midline  Location: lumbar  Injection technique: TIA saline  Needle  Needle type: Tuohy   Needle gauge: 18 G  Catheter type: side hole  Catheter size: 18 G  Catheter at skin depth: 10 cm  Catheter securement method: clear occlusive dressing  Test dose: negativelidocaine 1 5% with epinephrine 1:200,000 test dose, 3 mL  Assessment  Sensory level: [id number] aspiration for CSF, negative aspiration for heme and no paresthesia on injection  patient tolerated the procedure well with no immediate complications

## 2022-03-09 NOTE — PROCEDURES
External cephalic version     Date/Time 3/8/2022 9:49 PM     Performed by  Ollie Houser MD     Authorized by Ollie Houser MD      Universal Protocol   Procedure performed by:  Consent: Verbal consent obtained  Written consent obtained  Risks and benefits: risks, benefits and alternatives were discussed  Consent given by: patient  Time out: Immediately prior to procedure a "time out" was called to verify the correct patient, procedure, equipment, support staff and site/side marked as required  Patient understanding: patient states understanding of the procedure being performed  Patient consent: the patient's understanding of the procedure matches consent given  Procedure consent: procedure consent matches procedure scheduled  Relevant documents: relevant documents present and verified  Test results: test results available and properly labeled  Patient identity confirmed: verbally with patient        Local anesthesia used: no     Anesthesia   Local anesthesia used: no     Sedation   Patient sedated: spinal anesthesia  Procedure Details   Procedure Notes: SURGERY DATE: 3/8/22    Description: External Cephalic Version  A single intrauterine pregnancy in complete breech presentation    Pre-operative Diagnosis: IUP at 39w5d with complete breech prensentation    Post-operative Diagnosis: Same, fetal bradycardia    Procedure: ECV    Complications: fetal bradycardia    Procedure in Detail: The patient was brought to L&D where a reactive fetal heart tracing was obtained, FHT in 140's and reassuring  She was taken to the OR and given combined spinal epidural  The patient was given 1 dose of subcutaneous terbutaline at 2051  A beside ultrasound was performed which revealed single IUP and complete breech presentation  There was noted to be adequate fluid  Using manual pressure, the breech was manipulated in a forward roll fashion at 2055  Fetal heart tones were checked and noted to be reassuring   At 2057 a backwards roll was attempted  Fetal heart tones were checked and noted to be in the 50's  External cephalic version was abandoned and decision was made to perform stat

## 2022-03-09 NOTE — OP NOTE
OPERATIVE REPORT  PATIENT NAME: Henrique Ziegler    :  1995  MRN: [id number]  Pt Location: [facility] L&D OR ROOM 02    SURGERY DATE: 3/8/2022    Procedure Note:    Pre-Operative Diagnosis:   1  Single intrauterine pregnancy at 39w5d   2  Breech presentation  3  Failed external cephalic version  4  Fetal bradycardia    Post-Operative Diagnosis: Same, status post delivery    Procedure: Primary low transverse  section    Attending: Speedy Sequeira MD    Resident: Angie Hair MD    Anesthesia: epidural    Specimens:   1  Arterial & Venous Cord Gases  2  Cord Blood  3  Segment of Umbilical Cord (for storage)  4  Placenta to pathology    Umbilical Cord Venous Blood Gas:  Results from last 7 days   Lab Units 22  2105   PH COV  7 332   PCO2 COV mm HG 46 3*   HCO3 COV mmol/L 24 0   BASE EXC COV mmol/L -2 2*   O2 CT CD VB mL/dL 12 7   O2 HGB, VENOUS CORD % 69 8     Umbilical Cord Arterial Blood Gas:         QBL:  349 mL    Complications: None    Findings:  Viable male weighing 6lbs 14 1oz;  Apgar scores of 8 at one minute and 9 at five minutes  Meconium-stained amniotic fluid  Normal-appearing uterus, tubes, and ovaries  Normal-appearing placenta, meconium stained, with 3-vessel cord central insertion    Antibiotics:  2 g Ancef  5 million units penicillin for GBS prophylaxis    Indications:  Henrique Ziegler is a 31year old  at 44 5/7 weeks who was sent from the office for audible deceleration in the office  Upon arrival she was noted to have a spontaneous deceleration to the 100s which did recover  Decision made to proceed with delivery  She was noted to be breech presentation  After thorough counseling she elected to proceed with external cephalic version in hopes with proceeding with induction of labor once vertex presentation  Penicillin was given for GBS prophylaxis  Operative Procedure:    Patient was taken to the operating room where consent was ensured the patient was properly identified  epidural anesthesia was obtained without difficulty  She was then placed in the dorsal supine position with left lateral tilt  SCDs placed for VTE prophylaxis  Vaginal prep performed with chlorhexadine  Canchola was inserted in sterile fashion  Fetal heart tone was noted to be reassuring the upon evaluation in the 120s  External cephalic version attempted, see separate operative report for details  Briefly, terbutaline was administered at 20:51  Initial attempt with forward roll performed at 20:54  Heart rate by bedside ultrasound noted to be reassuring at 20:55  As second attempt at forward roll (counter clockwise) was unsuccessful, after verifying fetal heart rate reasusring at 20:56, attempted for backwards roll (clockwise) at 20:57  Shortly after, fetal heart rate observed to be slow  Fetal monitor placed, confirmed fetal heart rate in the 50s  Decision made to proceed with STAT  section for indication of fetal bradycardia, failed external cephalic version  Betadine splash performed at 20:59  Patient draped in sterile fashion  Time-out was performed  Pfannenstiel incision was made with a knife at 21:00 and carried down through the underlying subcutaneous layers to the level of the fascia which was nicked in the midline  We proceeded in a Jerome-García fashion  The rectus muscles were then bluntly  in the midline  Gentle sweep of the abdomen and fundus noted no adhesions  The peritoneum was placed on stretch  Another sweep of the abdomen and fundus noted minimal adhesions  Bladder blade was inserted  New knife was used to create the low transverse hysterotomy  Amnion was entered bluntly and meconium-stained fluid appreciated  The hysterotomy was bluntly extended in the cephalad-caudad directions with the surgeon's hands    The surgeon's hand was inserted through the hysterotomy and the fetal buttocks was elevated to the level of the hysterotomy in an atraumatic fashion  Legs were delivered using Pinard maneuver  Gentle downward and outward traction was applied until the  was delivered to the level of the axilla  The left upper extremity was delivered atraumatically in a downward sweeping motion  The  was rotated and the contralateral upper extremity was delivered in a similar fashion  Odxkxpw-Kizm-Umuuoh maneuver was used to delivery the aftercoming head   delivered at 21:02   was noted to be vigorous on the surgical field  Delayed cord clamping was performed for 30 seconds  The cord was then doubly clamped and cut and the  handed off to awaiting NICU staff  Arterial and venous cord gases cord blood and segment of umbilical cord were obtained for evaluation  The placenta delivered spontaneously with the uterine fundal massage  Placenta was noted to have a three-vessel cord and was meconium stained  The uterus was then exteriorized and several passes performed with a moist lap sponge clearing the uterus of clot and debris  Good uterine tone was noted at this time  The uterine incision was then closed with a running lock suture of 0 Vicryl on a CTX needle  A 2nd imbricating layer was performed with an 0 Monocryl on a CTX needle  The hysterotomy was inspected and noted to be hemostatic  Posterior cul-de-sac was cleansed of clot and debris  The uterus was returned to the abdomen  The pericolic gutters were inspected and cleansed of clots and debris  The rectus muscles were inspected and noted to be hemostatic  The fascia was inspected and no buttonholes appreciated  The fascia was then closed with a running stitch of 0 PDS  The subcutaneous tissue was reapproximated with 2-0 Vicryl in an interrupted fashion  The skin was closed with 3 0 Monocryl on a PS 2 needle  Good hemostasis was appreciated  Steri-Strips and benzoin were applied  Telfa and ABD dressing applied    Decision was made at that time to end the procedure  Patient was cleansed and transferred to recovery bed in stable condition  Abdominal binder applied  Sharp, instrument, and sponge counts reported correct x2  Both mother and  stable in immediate postpartum period  Fundal pressure applied at end of case with small amount lochia  Fundus firm at umbilicus  Mother transferred to recovery bed in stable condition  I was present and participated throughout the entire procedure  Judson Barillas Pin, [address] [facility]  3/8/2022 10:03 PM

## 2022-03-09 NOTE — ANESTHESIA POSTPROCEDURE EVALUATION
Post-Op Assessment Note    CV Status:  Stable  Pain Score: 0    Pain management: adequate     Mental Status:  Alert and sleepy   Hydration Status:  Euvolemic   PONV Controlled:  Controlled   Airway Patency:  Patent      Post Op Vitals Reviewed: Yes      Staff: CRNA         No complications documented      BP   103/50   Temp   97   Pulse 110   Resp   18   SpO2   99

## 2022-03-09 NOTE — PLAN OF CARE
Problem: PAIN - ADULT  Goal: Verbalizes/displays adequate comfort level or baseline comfort level  Description: Interventions:  - Encourage patient to monitor pain and request assistance  - Assess pain using appropriate pain scale  - Administer analgesics based on type and severity of pain and evaluate response  - Implement non-pharmacological measures as appropriate and evaluate response  - Consider cultural and social influences on pain and pain management  - Notify physician/advanced practitioner if interventions unsuccessful or patient reports new pain  Outcome: Progressing     Problem: INFECTION - ADULT  Goal: Absence or prevention of progression during hospitalization  Description: INTERVENTIONS:  - Assess and monitor for signs and symptoms of infection  - Monitor lab/diagnostic results  - Monitor all insertion sites, i e  indwelling lines, tubes, and drains  - Monitor endotracheal if appropriate and nasal secretions for changes in amount and color  - [place] appropriate cooling/warming therapies per order  - Administer medications as ordered  - Instruct and encourage patient and family to use good hand hygiene technique  - Identify and instruct in appropriate isolation precautions for identified infection/condition  Outcome: Progressing  Goal: Absence of fever/infection during neutropenic period  Description: INTERVENTIONS:  - Monitor WBC    Outcome: Progressing     Problem: SAFETY ADULT  Goal: Patient will remain free of falls  Description: INTERVENTIONS:  - Educate patient/family on patient safety including physical limitations  - Instruct patient to call for assistance with activity   - Consult OT/PT to assist with strengthening/mobility   - Keep Call bell within reach  - Keep bed low and locked with side rails adjusted as appropriate  - Keep care items and personal belongings within reach  - Initiate and maintain comfort rounds  - Make Fall Risk Sign visible to staff    - Apply yellow socks and bracelet for high fall risk patients  - Consider moving patient to room near nurses station  Outcome: Progressing  Goal: Maintain or return to baseline ADL function  Description: INTERVENTIONS:  -  Assess patient's ability to carry out ADLs; assess patient's baseline for ADL function and identify physical deficits which impact ability to perform ADLs (bathing, care of mouth/teeth, toileting, grooming, dressing, etc )  - Assess/evaluate cause of self-care deficits   - Assess range of motion  - Assess patient's mobility; develop plan if impaired  - Assess patient's need for assistive devices and provide as appropriate  - Encourage maximum independence but intervene and supervise when necessary  - Involve family in performance of ADLs  - Assess for home care needs following discharge   - Consider OT consult to assist with ADL evaluation and planning for discharge  - Provide patient education as appropriate  Outcome: Progressing  Goal: Maintains/Returns to pre admission functional level  Description: INTERVENTIONS:  - Perform BMAT or MOVE assessment daily    - Set and communicate daily mobility goal to care team and patient/family/caregiver  - Collaborate with rehabilitation services on mobility goals if consulted    - Out of bed for toileting  - Record patient progress and toleration of activity level   Outcome: Progressing     Problem: Knowledge Deficit  Goal: Patient/family/caregiver demonstrates understanding of disease process, treatment plan, medications, and discharge instructions  Description: Complete learning assessment and assess knowledge base    Interventions:  - Provide teaching at level of understanding  - Provide teaching via preferred learning methods  Outcome: Progressing     Problem: DISCHARGE PLANNING  Goal: Discharge to home or other facility with appropriate resources  Description: INTERVENTIONS:  - Identify barriers to discharge w/patient and caregiver  - Arrange for needed discharge resources and transportation as appropriate  - Identify discharge learning needs (meds, wound care, etc )  - Arrange for interpretive services to assist at discharge as needed  - Refer to Case Management Department for coordinating discharge planning if the patient needs post-hospital services based on physician/advanced practitioner order or complex needs related to functional status, cognitive ability, or social support system  Outcome: Progressing     Problem: BIRTH - VAGINAL/ SECTION  Goal: Fetal and maternal status remain reassuring during the birth process  Description: INTERVENTIONS:  - Monitor vital signs  - Monitor fetal heart rate  - Monitor uterine activity  - Monitor labor progression (vaginal delivery)  - DVT prophylaxis  - Antibiotic prophylaxis  Outcome: Progressing  Goal: Emotionally satisfying birthing experience for mother/fetus  Description: Interventions:  - Assess, plan, implement and evaluate the nursing care given to the patient in labor  - Advocate the philosophy that each childbirth experience is a unique experience and support the family's chosen level of involvement and control during the labor process   - Actively participate in both the patient's and family's teaching of the birth process  - Consider cultural, Anglican and age-specific factors and plan care for the patient in labor  Outcome: Progressing

## 2022-03-09 NOTE — UTILIZATION REVIEW
Inpatient Admission Authorization Request   Notification of Maternity/Delivery &  Birth Information for Admission   SERVICING FACILITY:   [address]  Tax ID: [id number]  NPI: [id number]  Place of Service: Inpatient [facility]  [address]  Place of Service Code: 24     ATTENDING PROVIDER:  Attending Name and NPI#: Nohemy Mendiola Md [[id number]]  Address: Aria Mayfield  [address]  Phone: [phone]     UTILIZATION REVIEW CONTACT:  Cooper Baig, Utilization Review Supervisor  [facility] Utilization Review Department  Phone: [phone]  Fax [phone]  Email: [email or web address]     PHYSICIAN ADVISORY SERVICES:  FOR [id number] REVIEW - MEDICAL NECESSITY DENIAL  Phone: [phone]  Fax: [phone]  Email: [email or web address]     TYPE OF REQUEST:  Inpatient Status     ADMISSION INFORMATION:  ADMISSION DATE/TIME: 3/8/22  6:48 PM  PATIENT DIAGNOSIS CODE/DESCRIPTION:  Decreased fetal movement [O36 8190]  39 weeks gestation of pregnancy [Z3A 39]  Encounter for  delivery without indication [O82] The primary encounter diagnosis was 39 weeks gestation of pregnancy  A diagnosis of Breech presentation was also pertinent to this visit  1  39 weeks gestation of pregnancy    2  Breech presentation      DISCHARGE DATE/TIME: No discharge date for patient encounter     MOTHER AND  INFORMATION:  Mother: Henrique Ziegler 1995   Delivering clinician:    OB History        1    Para        Term                AB        Living           SAB        IAB        Ectopic        Multiple        Live Births                    Name & MRN:   Information for the patient's :  Franklin Barragan [[id number]]      Delivery Information:  Sex: male  Delivered 3/8/2022 9:02 PM by , Low Transverse; Gestational Age: 38w11d     Measurements:  Weight: 6 lb 14 1 oz (3120 g); Height: 18"    APGAR 1 minute 5 minutes 10 minutes   Totals: 8 9       Birth Information: 31 y o  female MRN: [id number] Unit/Bed#: -01 Estimated Date of Delivery: 3/10/22  Birthweight: No birth weight on file  Gestational Age: <None> Delivery Type: , Low Transverse    IMPORTANT INFORMATION:  Please contact the [facility] directly with any questions or concerns regarding this request  Department voicemails are confidential     Send requests for admission clinical reviews, concurrent reviews, approvals, and administrative denials due to lack of clinical to fax [phone]

## 2022-03-10 PROCEDURE — 99024 POSTOP FOLLOW-UP VISIT: CPT | Performed by: STUDENT IN AN ORGANIZED HEALTH CARE EDUCATION/TRAINING PROGRAM

## 2022-03-10 RX ADMIN — ACETAMINOPHEN 650 MG: 325 TABLET, FILM COATED ORAL at 15:31

## 2022-03-10 RX ADMIN — IBUPROFEN 600 MG: 600 TABLET ORAL at 18:18

## 2022-03-10 RX ADMIN — ACETAMINOPHEN 650 MG: 325 TABLET, FILM COATED ORAL at 23:42

## 2022-03-10 RX ADMIN — IBUPROFEN 600 MG: 600 TABLET ORAL at 11:58

## 2022-03-10 RX ADMIN — ACETAMINOPHEN 650 MG: 325 TABLET, FILM COATED ORAL at 08:48

## 2022-03-10 RX ADMIN — KETOROLAC TROMETHAMINE 30 MG: 30 INJECTION, SOLUTION INTRAMUSCULAR at 00:17

## 2022-03-10 RX ADMIN — ACETAMINOPHEN 650 MG: 325 TABLET, FILM COATED ORAL at 00:17

## 2022-03-10 RX ADMIN — DOCUSATE SODIUM 100 MG: 100 CAPSULE ORAL at 18:18

## 2022-03-10 RX ADMIN — DOCUSATE SODIUM 100 MG: 100 CAPSULE ORAL at 08:48

## 2022-03-10 NOTE — PLAN OF CARE
Problem: PAIN - ADULT  Goal: Verbalizes/displays adequate comfort level or baseline comfort level  Description: Interventions:  - Encourage patient to monitor pain and request assistance  - Assess pain using appropriate pain scale  - Administer analgesics based on type and severity of pain and evaluate response  - Implement non-pharmacological measures as appropriate and evaluate response  - Consider cultural and social influences on pain and pain management  - Notify physician/advanced practitioner if interventions unsuccessful or patient reports new pain  Outcome: Progressing     Problem: INFECTION - ADULT  Goal: Absence or prevention of progression during hospitalization  Description: INTERVENTIONS:  - Assess and monitor for signs and symptoms of infection  - Monitor lab/diagnostic results  - Monitor all insertion sites, i e  indwelling lines, tubes, and drains  - Monitor endotracheal if appropriate and nasal secretions for changes in amount and color  - [place] appropriate cooling/warming therapies per order  - Administer medications as ordered  - Instruct and encourage patient and family to use good hand hygiene technique  - Identify and instruct in appropriate isolation precautions for identified infection/condition  Outcome: Progressing  Goal: Absence of fever/infection during neutropenic period  Description: INTERVENTIONS:  - Monitor WBC    Outcome: Progressing     Problem: SAFETY ADULT  Goal: Patient will remain free of falls  Description: INTERVENTIONS:  - Educate patient/family on patient safety including physical limitations  - Instruct patient to call for assistance with activity   - Consult OT/PT to assist with strengthening/mobility   - Keep Call bell within reach  - Keep bed low and locked with side rails adjusted as appropriate  - Keep care items and personal belongings within reach  - Initiate and maintain comfort rounds  - Make Fall Risk Sign visible to staff  - Apply yellow socks and bracelet for high fall risk patients  - Consider moving patient to room near nurses station  Outcome: Progressing  Goal: Maintain or return to baseline ADL function  Description: INTERVENTIONS:  -  Assess patient's ability to carry out ADLs; assess patient's baseline for ADL function and identify physical deficits which impact ability to perform ADLs (bathing, care of mouth/teeth, toileting, grooming, dressing, etc )  - Assess/evaluate cause of self-care deficits   - Assess range of motion  - Assess patient's mobility; develop plan if impaired  - Assess patient's need for assistive devices and provide as appropriate  - Encourage maximum independence but intervene and supervise when necessary  - Involve family in performance of ADLs  - Assess for home care needs following discharge   - Consider OT consult to assist with ADL evaluation and planning for discharge  - Provide patient education as appropriate  Outcome: Progressing  Goal: Maintains/Returns to pre admission functional level  Description: INTERVENTIONS:  - Perform BMAT or MOVE assessment daily    - Set and communicate daily mobility goal to care team and patient/family/caregiver  - Collaborate with rehabilitation services on mobility goals if consulted  - Out of bed for toileting  - Record patient progress and toleration of activity level   Outcome: Progressing     Problem: Knowledge Deficit  Goal: Patient/family/caregiver demonstrates understanding of disease process, treatment plan, medications, and discharge instructions  Description: Complete learning assessment and assess knowledge base    Interventions:  - Provide teaching at level of understanding  - Provide teaching via preferred learning methods  Outcome: Progressing     Problem: DISCHARGE PLANNING  Goal: Discharge to home or other facility with appropriate resources  Description: INTERVENTIONS:  - Identify barriers to discharge w/patient and caregiver  - Arrange for needed discharge resources and transportation as appropriate  - Identify discharge learning needs (meds, wound care, etc )  - Arrange for interpretive services to assist at discharge as needed  - Refer to Case Management Department for coordinating discharge planning if the patient needs post-hospital services based on physician/advanced practitioner order or complex needs related to functional status, cognitive ability, or social support system  Outcome: Progressing     Problem: BIRTH - VAGINAL/ SECTION  Goal: Fetal and maternal status remain reassuring during the birth process  Description: INTERVENTIONS:  - Monitor vital signs  - Monitor fetal heart rate  - Monitor uterine activity  - Monitor labor progression (vaginal delivery)  - DVT prophylaxis  - Antibiotic prophylaxis  Outcome: Completed  Goal: Emotionally satisfying birthing experience for mother/fetus  Description: Interventions:  - Assess, plan, implement and evaluate the nursing care given to the patient in labor  - Advocate the philosophy that each childbirth experience is a unique experience and support the family's chosen level of involvement and control during the labor process   - Actively participate in both the patient's and family's teaching of the birth process  - Consider cultural, [place] and age-specific factors and plan care for the patient in labor  Outcome: Completed     Problem: POSTPARTUM  Goal: Experiences normal postpartum course  Description: INTERVENTIONS:  - Monitor maternal vital signs  - Assess uterine involution and lochia  Outcome: Progressing  Goal: Appropriate maternal -  bonding  Description: INTERVENTIONS:  - Identify family support  - Assess for appropriate maternal/infant bonding   -Encourage maternal/infant bonding opportunities  - Referral to  or  as needed  Outcome: Progressing  Goal: Establishment of infant feeding pattern  Description: INTERVENTIONS:  - Assess breast/bottle feeding  - Refer to lactation as needed  Outcome: Progressing  Goal: Incision(s), wounds(s) or drain site(s) healing without S/S of infection  Description: INTERVENTIONS  - Assess and document dressing, incision, wound bed, drain sites and surrounding tissue  - Provide patient and family education  - Perform skin care/dressing changes every  Outcome: Progressing

## 2022-03-10 NOTE — LACTATION NOTE
This note was copied from a baby's chart  Follow up Lactation: Mom called requesting help with pump  Mom states she began pumping in the early morning due to one of her early hand expressions she did not achieve expression of colostrum  Education on how colostrum responds better to hand, s2s and latch  Education on pumping to assist with stimulation of the breast      Wilfrid Young is demonstrating early feeding cues  Assisted with positioning Cornelio to the right breast in laid back position  Active coordinated sucks achieved  Breast compressions were demonstrated with teach back to assist with milk transfer  Once Cornelio demonstrated fatigue, Hand expression techniques were reviewed  Mom expressed 15 mls of colostrum  Demonstration and teach back of cup and syringe feeding  Encouraged mom to pump after hand expression for stimulation, watch for early feeding cues, and call lactation for positioning assistance  Mom states she emailed [employer] and will reach out again if no response shortly about personal pump being delivered to house  Provided education on alignment of nose to breast; bring baby to breast and not breast to baby; support head with opp  Hand in cross cradle; use pillows to lift baby to be belly to belly; ear, shoulder, hip alignment; Support mother's back and place self in comfortable position to support bringing baby to the breast  Shoulders should be down and away from ears  Instructions given on pumping  Discussed when to start, frequency, different pumps available versus manual expression  Discussed hygiene of hands and supplies as well as assembly, placement of flanges, size of flanged, preparing the breast and cycles and suction settings on pump  Demonstrated use of hand pump  Discussed labeling of milk, storage, and preparation of stored milk  Pumping:   - When pumping, begin in stimulation mode (high cycle, low vacuum) until milk begins to express   Change pump to expression mode (low cycle, high vacuum)  Use hands on pumping techniques to assist with milk transfer  When milk stops expressing, change back to stimulation mode  When milk begins to flow, change to expression mode  You make cycle pump up to three times in a pumping session  Information on hand expression given  Discussed benefits of knowing how to manually express breast including stimulating milk supply, softening nipple for latch and evacuating breast in the event of engorgement

## 2022-03-10 NOTE — PROGRESS NOTES
Progress Note - OB/GYN   Earlean Divers 31 y o  female MRN: [id number]  Unit/Bed#: -01 Encounter: [id number]    Assessment:  POD#2 s/p primary low transverse  section for breech presentation after a failed ECV, in a stat fashion due to cat 2 tracing, stable    Plan:  1) Post-op care  Encourage ambulation   Encourage breastfeeding  Continue current meds   Hgb 11 7 --> 10 3    2) [place] catheter   Removed, voiding spontaneously    3) DVT ppx: SCDs    4) Disposition: anticipate discharge POD#2vs3    Subjective/Objective     Subjective:     Pain: yes, improved with meds  Tolerating PO: yes  Voiding: yes  Flatus: yes  BM: no  Ambulating: yes  Breastfeeding: Breastfeeding  Chest pain: no  Shortness of breath: no  Leg pain: no    Objective:     Vitals:  Vitals:    22 1618 22 2036 22 2346 03/10/22 0432   BP: 95/58 98/63 104/63 101/60   BP Location: Left arm Left arm Right arm Left arm   Pulse: 105 91 91 87   Resp: 18 18 18 18   Temp: 98 °F (36 7 °C) 98 1 °F (36 7 °C) 97 8 °F (36 6 °C) 97 5 °F (36 4 °C)   TempSrc: Oral Oral Oral Oral   SpO2: 99% 98% 98% 99%   Weight:       Height:           Physical Exam:   GEN:appears well, alert and oriented x 3, pleasant and cooperative   CV: RRR, no m/r/g  Lungs: CTA b/l, no w/r/r  ABDOMEN: soft, moderate tenderness throughout abdomen,   Uterine fundus firm and non-tender, at the umbilicus, Incision C/D/I  EXTREMITIES: non-tender, no edema       Lab Results   Component Value Date    WBC 17 58 (H) 2022    HGB 10 3 (L) 2022    HCT 29 9 (L) 2022    MCV 85 2022     2022           Farnaz Barrera MD, PGY-1  Obstetrics & Gynecology  03/10/22  6:39 AM

## 2022-03-11 VITALS
RESPIRATION RATE: 20 BRPM | WEIGHT: 152.4 LBS | OXYGEN SATURATION: 97 % | DIASTOLIC BLOOD PRESSURE: 65 MMHG | HEART RATE: 85 BPM | BODY MASS INDEX: 29.92 KG/M2 | SYSTOLIC BLOOD PRESSURE: 107 MMHG | HEIGHT: 60 IN | TEMPERATURE: 98.4 F

## 2022-03-11 PROCEDURE — 99024 POSTOP FOLLOW-UP VISIT: CPT | Performed by: STUDENT IN AN ORGANIZED HEALTH CARE EDUCATION/TRAINING PROGRAM

## 2022-03-11 RX ORDER — OXYCODONE HYDROCHLORIDE 5 MG/1
5 TABLET ORAL EVERY 4 HOURS PRN
Qty: 7 TABLET | Refills: 0 | Status: SHIPPED | OUTPATIENT
Start: 2022-03-11 | End: 2022-04-04 | Stop reason: ALTCHOICE

## 2022-03-11 RX ORDER — IBUPROFEN 600 MG/1
600 TABLET ORAL EVERY 6 HOURS
Qty: 30 TABLET | Refills: 0 | Status: SHIPPED | OUTPATIENT
Start: 2022-03-11 | End: 2022-04-04 | Stop reason: ALTCHOICE

## 2022-03-11 RX ORDER — ACETAMINOPHEN 325 MG/1
650 TABLET ORAL EVERY 4 HOURS PRN
Qty: 30 TABLET | Refills: 0 | Status: SHIPPED | OUTPATIENT
Start: 2022-03-11

## 2022-03-11 RX ORDER — ACETAMINOPHEN AND CODEINE PHOSPHATE 120; 12 MG/5ML; MG/5ML
1 SOLUTION ORAL DAILY
Qty: 90 TABLET | Refills: 0 | Status: SHIPPED | OUTPATIENT
Start: 2022-04-09 | End: 2022-04-04 | Stop reason: SDUPTHER

## 2022-03-11 RX ADMIN — ACETAMINOPHEN 650 MG: 325 TABLET, FILM COATED ORAL at 11:40

## 2022-03-11 RX ADMIN — IBUPROFEN 600 MG: 600 TABLET ORAL at 12:29

## 2022-03-11 RX ADMIN — IBUPROFEN 600 MG: 600 TABLET ORAL at 01:00

## 2022-03-11 RX ADMIN — IBUPROFEN 600 MG: 600 TABLET ORAL at 06:57

## 2022-03-11 RX ADMIN — ACETAMINOPHEN 650 MG: 325 TABLET, FILM COATED ORAL at 06:46

## 2022-03-11 NOTE — PLAN OF CARE
Problem: PAIN - ADULT  Goal: Verbalizes/displays adequate comfort level or baseline comfort level  Description: Interventions:  - Encourage patient to monitor pain and request assistance  - Assess pain using appropriate pain scale  - Administer analgesics based on type and severity of pain and evaluate response  - Implement non-pharmacological measures as appropriate and evaluate response  - Consider cultural and social influences on pain and pain management  - Notify physician/advanced practitioner if interventions unsuccessful or patient reports new pain  Outcome: Progressing     Problem: INFECTION - ADULT  Goal: Absence or prevention of progression during hospitalization  Description: INTERVENTIONS:  - Assess and monitor for signs and symptoms of infection  - Monitor lab/diagnostic results  - Monitor all insertion sites, i e  indwelling lines, tubes, and drains  - Monitor endotracheal if appropriate and nasal secretions for changes in amount and color  - [place] appropriate cooling/warming therapies per order  - Administer medications as ordered  - Instruct and encourage patient and family to use good hand hygiene technique  - Identify and instruct in appropriate isolation precautions for identified infection/condition  Outcome: Progressing  Goal: Absence of fever/infection during neutropenic period  Description: INTERVENTIONS:  - Monitor WBC    Outcome: Progressing     Problem: SAFETY ADULT  Goal: Patient will remain free of falls  Description: INTERVENTIONS:  - Educate patient/family on patient safety including physical limitations  - Instruct patient to call for assistance with activity   - Consult OT/PT to assist with strengthening/mobility   - Keep Call bell within reach  - Keep bed low and locked with side rails adjusted as appropriate  - Keep care items and personal belongings within reach  - Initiate and maintain comfort rounds  - Make Fall Risk Sign visible to staff  - Apply yellow socks and bracelet for high fall risk patients  - Consider moving patient to room near nurses station  Outcome: Progressing  Goal: Maintain or return to baseline ADL function  Description: INTERVENTIONS:  -  Assess patient's ability to carry out ADLs; assess patient's baseline for ADL function and identify physical deficits which impact ability to perform ADLs (bathing, care of mouth/teeth, toileting, grooming, dressing, etc )  - Assess/evaluate cause of self-care deficits   - Assess range of motion  - Assess patient's mobility; develop plan if impaired  - Assess patient's need for assistive devices and provide as appropriate  - Encourage maximum independence but intervene and supervise when necessary  - Involve family in performance of ADLs  - Assess for home care needs following discharge   - Consider OT consult to assist with ADL evaluation and planning for discharge  - Provide patient education as appropriate  Outcome: Progressing  Goal: Maintains/Returns to pre admission functional level  Description: INTERVENTIONS:  - Perform BMAT or MOVE assessment daily    - Set and communicate daily mobility goal to care team and patient/family/caregiver  - Collaborate with rehabilitation services on mobility goals if consulted  - Out of bed for toileting  - Record patient progress and toleration of activity level   Outcome: Progressing     Problem: Knowledge Deficit  Goal: Patient/family/caregiver demonstrates understanding of disease process, treatment plan, medications, and discharge instructions  Description: Complete learning assessment and assess knowledge base    Interventions:  - Provide teaching at level of understanding  - Provide teaching via preferred learning methods  Outcome: Progressing     Problem: DISCHARGE PLANNING  Goal: Discharge to home or other facility with appropriate resources  Description: INTERVENTIONS:  - Identify barriers to discharge w/patient and caregiver  - Arrange for needed discharge resources and transportation as appropriate  - Identify discharge learning needs (meds, wound care, etc )  - Arrange for interpretive services to assist at discharge as needed  - Refer to Case Management Department for coordinating discharge planning if the patient needs post-hospital services based on physician/advanced practitioner order or complex needs related to functional status, cognitive ability, or social support system  Outcome: Progressing     Problem: POSTPARTUM  Goal: Experiences normal postpartum course  Description: INTERVENTIONS:  - Monitor maternal vital signs  - Assess uterine involution and lochia  Outcome: Progressing  Goal: Appropriate maternal -  bonding  Description: INTERVENTIONS:  - Identify family support  - Assess for appropriate maternal/infant bonding   -Encourage maternal/infant bonding opportunities  - Referral to  or  as needed  Outcome: Progressing  Goal: Establishment of infant feeding pattern  Description: INTERVENTIONS:  - Assess breast/bottle feeding  - Refer to lactation as needed  Outcome: Progressing  Goal: Incision(s), wounds(s) or drain site(s) healing without S/S of infection  Description: INTERVENTIONS  - Assess and document dressing, incision, wound bed, drain sites and surrounding tissue  - Provide patient and family education  - Perform skin care/dressing changes every  Outcome: Progressing

## 2022-03-11 NOTE — LACTATION NOTE
This note was copied from a baby's chart  Follow up lactation: mom called for help with latch  Education on HE; positioning; s2s  Baby latched deeply to left breast - active, coordinated suckling for 20 min  After tired out, HE +++ on right breast - Fed baby another 15 ml via cup/syringe  Mom is waiting for [employer] to answer call  Mom called and sat on hold for over 1 hr to get madelin pineda  Encouraged to attempt again, send email and speak to lactation prior to d/c if [employer] does not call back

## 2022-03-11 NOTE — PROGRESS NOTES
Progress Note - OB/GYN   Ellie Stafford 31 y o  female MRN: [id number]  Unit/Bed#:  322-01 Encounter: [id number]    Assessment:  POD#3 s/p primary low transverse  section for breech presentation after a failed ECV, in a stat fashion due to cat 2 tracing, stable    Plan:  1) Post-op care  Encourage ambulation   Encourage breastfeeding  Continue current meds   Hgb 11 7 --> 10 3    2) [place] catheter   Removed, voiding spontaneously    3) DVT ppx: SCDs    4) Contraception   Micronor    5) Disposition: anticipate discharge POD#2vs3    Subjective/Objective     Subjective:     Pain: yes, improved with meds  Tolerating PO: yes  Voiding: yes  Flatus: yes  BM: no  Ambulating: yes  Breastfeeding: Breastfeeding  Chest pain: no  Shortness of breath: no  Leg pain: no    Objective:     Vitals:  Vitals:    03/10/22 1215 03/10/22 1611 03/10/22 2037 22 0000   BP: 93/57 91/52 101/52 96/54   BP Location: Left arm   Left arm   Pulse: 84 89 91 81   Resp: 18 18 18 18   Temp: 98 4 °F (36 9 °C) 98 6 °F (37 °C) 98 1 °F (36 7 °C) 97 7 °F (36 5 °C)   TempSrc: Oral Oral Oral Oral   SpO2:  96% 97% 95%   Weight:       Height:           Physical Exam:   GEN:appears well, alert and oriented x 3, pleasant and cooperative   CV: RRR, no m/r/g  Lungs: CTA b/l, no w/r/r  ABDOMEN: soft, moderate tenderness throughout abdomen,   Uterine fundus firm and non-tender, at the umbilicus, Incision C/D/I  EXTREMITIES: non-tender, no edema       Lab Results   Component Value Date    WBC 17 58 (H) 2022    HGB 10 3 (L) 2022    HCT 29 9 (L) 2022    MCV 85 2022     2022           Sharon Hernandez MD, PGY-1  Obstetrics & Gynecology  22  6:46 AM

## 2022-03-11 NOTE — LACTATION NOTE
This note was copied from a baby's chart  Patient has previously received DC booklet and states it was reviewed with her  Follow up teaching provided about breast care, importance of demand feedings and when to call Ped  She states baby is latching well and enc her to call for any latching support as needed PTD  She has no further questions or concerns at this time  Enc her to contact [phone] for support after DC  Mom has baby positioned properly in football hold  reinforced importance of alignment of baby's ear, shoulder, and hip in any preferred position  Worked on supporting baby at breast level and beginning the feed with baby's nose arriving at the nipple  Then, using areolar compression while guiding baby chin-forward (head tilted back) to the breast to achieve a deep, comfortable latch  Baby is sleepy but gapes with encouragement, and latches very well at this time with sustained, rhythmic sucking  Mom reports comfort with latch, enc her to cont to call for support as needed throughout her stay and to contact [facility] for needs after DC

## 2022-03-11 NOTE — PLAN OF CARE
Problem: PAIN - ADULT  Goal: Verbalizes/displays adequate comfort level or baseline comfort level  Description: Interventions:  - Encourage patient to monitor pain and request assistance  - Assess pain using appropriate pain scale  - Administer analgesics based on type and severity of pain and evaluate response  - Implement non-pharmacological measures as appropriate and evaluate response  - Consider cultural and social influences on pain and pain management  - Notify physician/advanced practitioner if interventions unsuccessful or patient reports new pain  Outcome: Progressing     Problem: INFECTION - ADULT  Goal: Absence or prevention of progression during hospitalization  Description: INTERVENTIONS:  - Assess and monitor for signs and symptoms of infection  - Monitor lab/diagnostic results  - Monitor all insertion sites, i e  indwelling lines, tubes, and drains  - Monitor endotracheal if appropriate and nasal secretions for changes in amount and color  - [place] appropriate cooling/warming therapies per order  - Administer medications as ordered  - Instruct and encourage patient and family to use good hand hygiene technique  - Identify and instruct in appropriate isolation precautions for identified infection/condition  Outcome: Progressing  Goal: Absence of fever/infection during neutropenic period  Description: INTERVENTIONS:  - Monitor WBC    Outcome: Progressing     Problem: SAFETY ADULT  Goal: Patient will remain free of falls  Description: INTERVENTIONS:  - Educate patient/family on patient safety including physical limitations  - Instruct patient to call for assistance with activity   - Consult OT/PT to assist with strengthening/mobility   - Keep Call bell within reach  - Keep bed low and locked with side rails adjusted as appropriate  - Keep care items and personal belongings within reach  - Initiate and maintain comfort rounds  - Make Fall Risk Sign visible to staff  - Apply yellow socks and bracelet for high fall risk patients  - Consider moving patient to room near nurses station  Outcome: Progressing  Goal: Maintain or return to baseline ADL function  Description: INTERVENTIONS:  -  Assess patient's ability to carry out ADLs; assess patient's baseline for ADL function and identify physical deficits which impact ability to perform ADLs (bathing, care of mouth/teeth, toileting, grooming, dressing, etc )  - Assess/evaluate cause of self-care deficits   - Assess range of motion  - Assess patient's mobility; develop plan if impaired  - Assess patient's need for assistive devices and provide as appropriate  - Encourage maximum independence but intervene and supervise when necessary  - Involve family in performance of ADLs  - Assess for home care needs following discharge   - Consider OT consult to assist with ADL evaluation and planning for discharge  - Provide patient education as appropriate  Outcome: Progressing  Goal: Maintains/Returns to pre admission functional level  Description: INTERVENTIONS:  - Perform BMAT or MOVE assessment daily    - Set and communicate daily mobility goal to care team and patient/family/caregiver  - Collaborate with rehabilitation services on mobility goals if consulted  - Out of bed for toileting  - Record patient progress and toleration of activity level   Outcome: Progressing     Problem: Knowledge Deficit  Goal: Patient/family/caregiver demonstrates understanding of disease process, treatment plan, medications, and discharge instructions  Description: Complete learning assessment and assess knowledge base    Interventions:  - Provide teaching at level of understanding  - Provide teaching via preferred learning methods  Outcome: Progressing     Problem: DISCHARGE PLANNING  Goal: Discharge to home or other facility with appropriate resources  Description: INTERVENTIONS:  - Identify barriers to discharge w/patient and caregiver  - Arrange for needed discharge resources and transportation as appropriate  - Identify discharge learning needs (meds, wound care, etc )  - Arrange for interpretive services to assist at discharge as needed  - Refer to Case Management Department for coordinating discharge planning if the patient needs post-hospital services based on physician/advanced practitioner order or complex needs related to functional status, cognitive ability, or social support system  Outcome: Progressing     Problem: POSTPARTUM  Goal: Experiences normal postpartum course  Description: INTERVENTIONS:  - Monitor maternal vital signs  - Assess uterine involution and lochia  Outcome: Progressing  Goal: Appropriate maternal -  bonding  Description: INTERVENTIONS:  - Identify family support  - Assess for appropriate maternal/infant bonding   -Encourage maternal/infant bonding opportunities  - Referral to  or  as needed  Outcome: Progressing  Goal: Establishment of infant feeding pattern  Description: INTERVENTIONS:  - Assess breast/bottle feeding  - Refer to lactation as needed  Outcome: Progressing  Goal: Incision(s), wounds(s) or drain site(s) healing without S/S of infection  Description: INTERVENTIONS  - Assess and document dressing, incision, wound bed, drain sites and surrounding tissue  - Provide patient and family education  - Perform skin care/dressing changes every  Outcome: Progressing

## 2022-03-15 ENCOUNTER — POSTPARTUM VISIT (OUTPATIENT)
Dept: OBGYN CLINIC | Age: 27
End: 2022-03-15

## 2022-03-15 VITALS
WEIGHT: 143 LBS | SYSTOLIC BLOOD PRESSURE: 108 MMHG | BODY MASS INDEX: 28.07 KG/M2 | HEIGHT: 60 IN | DIASTOLIC BLOOD PRESSURE: 66 MMHG

## 2022-03-15 DIAGNOSIS — Z98.891 STATUS POST PRIMARY LOW TRANSVERSE CESAREAN SECTION: Primary | ICD-10-CM

## 2022-03-15 PROCEDURE — 99024 POSTOP FOLLOW-UP VISIT: CPT | Performed by: STUDENT IN AN ORGANIZED HEALTH CARE EDUCATION/TRAINING PROGRAM

## 2022-03-15 NOTE — PROGRESS NOTES
Murphy Juan  1995    S:  31 y o  female here for incision check  She is s/p  (fetal intolerance) on 3/8/22  Gender: male   Apgars: 8/9  Weight: 6#14oz    She is Breastfeeding without problems  She is eating normally, denies constipation, diarrhea, urinary dysfunction  She denies postpartum blues/depression  Her EPDS is 1         O:   /66 (BP Location: Right arm, Patient Position: Sitting, Cuff Size: Standard)   Ht 5' (1 524 m)   Wt 64 9 kg (143 lb)   LMP 2021   Breastfeeding Yes   BMI 27 93 kg/m²   She appears well and in no distress  Abdomen is soft and nontender, incision c/d/i  External genitals are normal    A/P:  Incision check     She will return in 2 weeks for routine PP visit

## 2022-03-20 ENCOUNTER — NURSE TRIAGE (OUTPATIENT)
Dept: OTHER | Facility: OTHER | Age: 27
End: 2022-03-20

## 2022-03-20 ENCOUNTER — TELEPHONE (OUTPATIENT)
Dept: LABOR AND DELIVERY | Facility: HOSPITAL | Age: 27
End: 2022-03-20

## 2022-03-21 NOTE — TELEPHONE ENCOUNTER
Regarding: Had c-setion, having abdminal pain   ----- Message from Neftali Waldron sent at 3/20/2022  8:55 PM EDT -----  '' My wife had a  3/8 and now his is having abdominal pain  ''

## 2022-03-21 NOTE — TELEPHONE ENCOUNTER
Pt Roz Colemichael 1995 postpartum  3/8/2022 started with right upper abd pain 1 1/2 hours ago 7/10 also has been having shoulder pain since surgery  she doesn't have her gallbladder  she took Motrin 600 mg 1 ago and Tylenol 2 hours ago didn't seem to help  9:30 PM  I would recommend rest and heat  9:39 PM  If it doesnt improve by tomorrow she should call the office  9:39 PM  Okay she did say she did a lot today  9:39 PM  Sounds right, thank you  9:40 PM    Answer Assessment - Initial Assessment Questions  1  LOCATION: "Where does it hurt?"       Right side below ribs   2  RADIATION: "Does the pain shoot anywhere else?" (e g , chest, back)      no  3  ONSET: "When did the pain begin?" (Minutes, hours or days ago)       1 1/2 hours ago  4  DELIVERY DATE: "When was your delivery date?" "Vaginal delivery or ?"      3/8/2022  5  ONSET: "Gradual or sudden onset?"      Sudden  6  PATTERN "Does the pain come and go, or has it been constant since it started?"  (Note: most serious pain is constant and it progresses)       constant  7  SEVERITY: "How bad is the pain?" "What does it keep you from doing?"      - MILD -  doesn't interfere with normal activities, abdomen soft and not tender to touch      - MODERATE - interferes with normal activities or awakens from sleep, tender to touch      - SEVERE - patient doesn't want to move, doubled over, abdomen rigid (R/O peritonitis)      Moderate  8  FEVER: "Do you have a fever?" If Yes, ask: "What is your temperature, how was it measured, and when did it start?"      no  9  VAGINAL BLEEDING - DISCHARGE: "Describe any vaginal bleeding or discharge you are having " (e g , amount; color; odor)      no  10   OTHER SYMPTOMS: "Has there been any vomiting, diarrhea, constipation, or urine problems?"        Shoulder pain    Protocols used: POSTPARTUM - ABDOMINAL PAIN-ADULT-

## 2022-03-30 PROBLEM — O35.2XX0 HEREDITARY DISEASE IN FAMILY POSSIBLY AFFECTING FETUS: Status: RESOLVED | Noted: 2021-10-28 | Resolved: 2022-03-30

## 2022-03-30 PROBLEM — Z34.03 ENCOUNTER FOR SUPERVISION OF NORMAL FIRST PREGNANCY IN THIRD TRIMESTER: Status: RESOLVED | Noted: 2021-10-25 | Resolved: 2022-03-30

## 2022-03-30 PROBLEM — E74.39 GLUCOSE INTOLERANCE: Status: RESOLVED | Noted: 2022-02-08 | Resolved: 2022-03-30

## 2022-03-30 PROBLEM — Z28.310 COVID-19 VACCINE SERIES DECLINED: Status: RESOLVED | Noted: 2021-10-25 | Resolved: 2022-03-30

## 2022-03-30 PROBLEM — Z28.21 INFLUENZA VACCINATION DECLINED: Status: RESOLVED | Noted: 2021-10-25 | Resolved: 2022-03-30

## 2022-03-30 PROBLEM — Z28.21 COVID-19 VACCINE SERIES DECLINED: Status: RESOLVED | Noted: 2021-10-25 | Resolved: 2022-03-30

## 2022-03-30 NOTE — PATIENT INSTRUCTIONS
Self Care After Delivery   AMBULATORY CARE:   The postpartum period  is the period of time from delivery to about 6 weeks  During this time you may experience many physical and emotional changes  It is important to understand what is normal and when you need to call your healthcare provider  It is also important to know how to care for yourself during this time  Call your local emergency number (911 in the [address] [facility]) for any of the following:   · You see or hear things that are not there, or have thoughts of harming yourself or your baby  · You soak through 1 pad in 15 minutes, have blurry vision, clammy or pale skin, and feel faint  · You faint or lose consciousness  · You have trouble breathing  · You cough up blood  · Your  incision comes apart  Seek care immediately if:   · Your heart is beating faster than usual     · You have a bad headache or changes in your vision  · Your episiotomy or  incision is red, swollen, bleeding, or draining pus  · You have severe abdominal pain  Call your doctor or obstetrician if:   · Your leg is painful, red, and larger than usual     · You soak through 1 or more pads in an hour, or pass blood clots larger than a quarter from your vagina  · You have a fever  · You have new or worsening pain in your abdomen or vagina  · You continue to have depression 1 to 2 weeks after you deliver  · You have trouble sleeping  · You have foul-smelling discharge from your vagina  · You have pain or burning when you urinate  · You do not have a bowel movement for 3 days or more  · You have nausea or are vomiting  · You have hard lumps or red streaks over your breasts  · You have cracked nipples or bleed from your nipples  · You have questions or concerns about your condition or care  Physical changes:   The following are normal changes after you give birth:  · Pain in the area between your anus and vagina    · Breast pain    · Constipation or hemorrhoids    · Hot or cold flashes    · Vaginal bleeding or discharge    · Mild to moderate abdominal cramping    · Difficulty controlling bowel movements or urine    Emotional changes:  A drop in hormone levels after you deliver may cause changes in your emotions  You may feel irritable, sad, or anxious  You may cry easily or for no reason  You may also feel depressed  Depression that continues can be a sign of postpartum depression, a condition that can be treated  Treatment may include talk therapy, medicines, or both  Healthcare providers will ask how you are feeling and if you have any depression  These talks can happen during appointments for your medical care and for your baby's care, such as well child visits  Providers can help you find ways to care for yourself and your baby  Talk to your providers about the following:  · When emotional changes or depression started, and if it is getting worse over time    · Problems you are having with daily activities, sleep, or caring for your baby    · If anything makes you feel worse, or makes you feel better    · Feeling that you are not bonding with your baby the way you want    · Any problems your baby has with sleeping or feeding    · Your baby is fussy or cries a lot    · Support you have from friends, family, or others    Breast care for breastfeeding mothers: You may have sore breasts for 3 to 6 days after you give birth  This happens as your milk begins to fill your breasts  You may also have sore breasts if you do not breastfeed frequently  Do the following to care for your breasts:  · Apply a moist, warm, compress to your breast as directed  This may help soothe your breasts  Make sure the washcloth is not too hot before you apply it to your breast     · Nurse your baby or pump your milk frequently  This may prevent clogged milk ducts  Ask your healthcare provider how often to nurse or pump      · Massage your breasts as directed  This may help increase your milk flow  Gently rub your breasts in a circular motion before you breastfeed  You may need to gently squeeze your breast or nipple to help release milk  You can also use a breast pump to help release milk from your breast     · Wash your breasts with warm water only  Do not put soap on your nipples  Soap may cause your nipples to become dry  · Apply lanolin cream to your nipples as directed  Lanolin cream may add moisture to your skin and prevent nipple dryness  Always  wash off lanolin cream with warm water before you breastfeed  · Place pads in your bra  Your nipples may leak milk when you are not breastfeeding  You can place pads inside of your bra to help prevent leaking onto your clothing  Ask your healthcare provider where to purchase bra pads  · Get breastfeeding support if needed  Healthcare providers can answer questions about breastfeeding and provide you with support  Ask your healthcare provider who you can contact if you need breastfeeding support  Breast care for non-breastfeeding mothers:  Milk will fill your breasts even if you bottle feed your baby  Do the following to help stop your milk from filling your breasts and causing pain:  · Wear a bra with support at all times  A sports bra or a tight-fitting bra will help stop your milk from coming in  · Apply ice on each breast for 15 to 20 minutes every hour or as directed  Use an ice pack, or put crushed ice in a plastic bag  Cover it with a towel before you apply it to your breast  Ice helps your milk ducts shrink  · Keep your breasts away from warm water  Warm water will make it easier for milk to fill your breasts  Stand with your breasts away from warm water in the shower  · Limit how much you touch your breasts  This will prevent them from filling with milk  Perineum care: Your perineum is the area between your rectum and vagina   It is normal to have swelling and pain in this area after you give birth  If you had an episiotomy, your healthcare provider may give you special instructions  · Clean your perineum after you use the bathroom  This may prevent infection and help with healing  Use a spray bottle with warm water to clean your perineum  You may also gently spray warm water against your perineum when you urinate  Always wipe front to back  · Take a sitz bath as directed  A sitz bath may help relieve swelling and pain  Fill your bath tub or bucket with water up to your hips and sit in the water  Use cold water for 2 days after you deliver  Then use warm water  Ask your healthcare provider for more information about a sitz bath  · Apply ice packs for the first 24 hours or as directed  Use a plastic glove filled with ice or buy an ice pack  Wrap the ice pack or plastic glove in a small towel or wash cloth  Place the ice pack on your perineum for 20 minutes at a time  · Sit on a donut-shaped pillow  This may relieve pressure on your perineum when you sit  · Use wipes that contain medicine or take pills as directed  Your healthcare provider may tell you to use witch hazel pads  You can place witch hazel pads in the refrigerator before you apply them to your perineum  Your provider may also tell you to take NSAIDs  Ask him or her how often to take pills or use the wipes  · Do not go swimming or take tub baths for 4 to 6 weeks or as directed  This will help prevent an infection in your vagina or uterus  Bowel and bladder care: It may take 3 to 5 days to have a bowel movement after you deliver your baby  You can do the following to prevent or manage constipation, and get control of your bowel or bladder:  · Take stool softeners as directed  A stool softener is medicine that will make your bowel movements softer  This may prevent or relieve constipation  A stool softener may also make bowel movements less painful  · Drink plenty of liquids    Ask how much liquid to drink each day and which liquids are best for you  Liquids may help prevent constipation  · Eat foods high in fiber  Examples include fruits, vegetables, grains, beans, and lentils  Ask your healthcare provider how much fiber you need each day  Fiber may prevent constipation  · Do Kegel exercises as directed  Kegel exercises will help strengthen the muscles that control bowel movements and urination  Ask your healthcare provider for more information on Kegel exercises  · Apply cold compresses or medicine to hemorrhoids as directed  This may relieve swelling and pain  Your healthcare provider may tell you to apply ice or wipes that contain medicine to your hemorrhoids  He or she may also tell you to use a sitz bath  Ask your provider for more information on how to manage hemorrhoids  Nutrition:  Good nutrition is important in the postpartum period  It will help you return to a healthy weight, increase your energy levels, and prevent constipation  It will also help you get enough nutrients and calories if you are going to breastfeed your baby  · Eat a variety of healthy foods  Healthy foods include fruits, vegetables, whole-grain breads, low-fat dairy products, beans, lean meats, and fish  You may need 500 to 700 extra calories each day if you breastfeed your baby  You may also need extra protein  · Limit foods with added sugar and high amounts of fat  These foods are high in calories and low in healthy nutrients  Read food labels so you know how much sugar and fat is in the food you want to eat  · Drink 8 to 10 glasses of water per day  Water will help you make plenty of milk for your baby  It will also help prevent constipation  Drink a glass of water every time you breastfeed your baby  · Take vitamins as directed  Ask your healthcare provider what vitamins you need  · Limit caffeine and alcohol if you are breastfeeding    Caffeine and alcohol can get into your breast milk  Caffeine and alcohol can make your baby fussy  They can also interfere with your baby's sleep  Ask your healthcare provider if you can drink alcohol or caffeine  Rest and sleep: You may feel very tired in the postpartum period  Enough sleep will help you heal and give you energy to care for your baby  The following may help you get sleep and rest:  · Nap when your baby naps  Your baby may nap several times during the day  Get rest during this time  · Limit visitors  Many people may want to see you and your baby  Ask friends or family to visit on different days  This will give you time to rest     · Do not plan too much for one day  Put off household chores so that you have time to rest  Gradually do more each day  · Ask for help from family, friends, or neighbors  Ask them to help you with laundry, cleaning, or errands  Also ask someone to watch the baby while you take a nap or relax  Ask your partner to help with the care of your baby  Pump some of your breast milk so your partner can feed your baby during the night  Exercise after delivery:  Wait until your healthcare provider says it is okay to exercise  Exercise can help you lose weight, increase your energy levels, and manage your mood  It can also prevent constipation and blood clots  Start with gentle exercises such as walking  Do more as you have more energy  You may need to avoid abdominal exercises for 1 to 2 weeks after you deliver  Talk to your healthcare provider about an exercise plan that is right for you  Sexual activity after delivery:   · Do not have sex until your healthcare provider says it is okay  You may need to wait 4 to 6 weeks before you have sex  This may prevent infection and allow time to heal     · Your menstrual cycle may begin as soon as 3 weeks after you deliver  Your period may be delayed if you breastfeed your baby  You can become pregnant before you get your first postpartum period   Talk to your healthcare provider about birth control that is right for you  Some types of birth control are not safe during breastfeeding  For support and more information:  Join a support group for new mothers  Ask for help from family and friends with chores, errands, and care of your baby  · Office of Women's Health,  Department of Health and Human Services  [address], [address] [facility] [place]  [address], [address] [facility] [place] [address]  Phone: [phone]  Web Address: www womenshealth gov    · [facility] [address] [facility] , [address]  [address] [facility] , [address] [facility]  Web Address: [employer] be  [email or web address]  [email or web address]    Follow up with your doctor or obstetrician as directed: You will need to follow up within 2 to 6 weeks of delivery  Write down your questions so you remember to ask them at your visits  © Copyright [employer] 2022 Information is for End User's use only and may not be sold, redistributed or otherwise used for commercial purposes  All illustrations and images included in CareNotes® are the copyrighted property of [employer]  or [address] [place], [place] [address]frances   The above information is an  only  It is not intended as medical advice for individual conditions or treatments  Talk to your doctor, nurse or pharmacist before following any medical regimen to see if it is safe and effective for you

## 2022-03-30 NOTE — PROGRESS NOTES
Postpartum Note  Washington Eldridge 31 y o  @ [id number]          Subjective: Pleasant 31 y o  here for postpartum exam  EDPS of 2  The baby and mom are doing well  100% Breastfeeding  Primary  done on  for fetal intolerance  No complaints offered  BCM method of choice is POP  Patient denies fever, pelvic pain or bleeding issues  She has been having some left lower quadrant/ groin pain for the past 2 weeks  She will continue to monitor but if it does not resolve she will have a pelvic ultrasound done  She denies GI or  issues associated with this  Past Medical History:   Diagnosis Date    Varicella     had vaccines     Past Surgical History:   Procedure Laterality Date    CARDIAC SURGERY      age 2   Marivel Frankel CHOLECYSTECTOMY  64    [place] ANTEPARTUM HEAD MANIPULATION N/A 3/8/2022    Procedure: VERSION EXTERNAL CEPHALIC;  Surgeon: Luz Gallagher MD;  [facility];  Service: Obstetrics    NH  DELIVERY ONLY N/A 3/8/2022    Procedure:  SECTION ();   Surgeon: Luz Gallagher MD;  [facility];  Service: Obstetrics     Patient Active Problem List    Diagnosis Date Noted    Status post primary low transverse  section 2022    History of congenital heart defect 10/25/2021    GBS bacteriuria 10/25/2021     No Known Allergies  OB History    Para Term  AB Living   1 1 1     1   SAB IAB Ectopic Multiple Live Births         0 1      # Outcome Date GA Lbr Tyrone/2nd Weight Sex Delivery Anes PTL Lv   1 Term 22 39w5d  3120 g (6 lb 14 1 oz) M CS-LTranv EPI, Spinal  BRIGHT           Meds:   Current Outpatient Medications:     acetaminophen (TYLENOL) 325 mg tablet, Take 2 tablets (650 mg total) by mouth every 4 (four) hours as needed for mild pain, headaches or fever, Disp: 30 tablet, Rfl: 0    ibuprofen (MOTRIN) 600 mg tablet, Take 1 tablet (600 mg total) by mouth every 6 (six) hours, Disp: 30 tablet, Rfl: 0    [START ON 2022] norethindrone (Ortho Micronor) 0 35 MG tablet, Take 1 tablet (0 35 mg total) by mouth daily, Disp: 90 tablet, Rfl: 0    oxyCODONE (ROXICODONE) 5 immediate release tablet, Take 1 tablet (5 mg total) by mouth every 4 (four) hours as needed for severe pain for up to 7 doses Max Daily Amount: 30 mg, Disp: 7 tablet, Rfl: 0    Prenatal Vit-Fe Fumarate-FA (PRENATAL PO), Take by mouth, Disp: , Rfl:     Vitals: Last menstrual period 2021, currently breastfeeding  ,There is no height or weight on file to calculate BMI  Review of Systems:  CONSTITUTIONALno weight loss, fever, night sweats and feels well  CVS: denies chest pain, chest pressure/discomfort, dyspnea, irregular heart beat, lower extremity edema and palpitations  RESP:no cough, shortness of breath, or wheezing  GI:Normal BM's, denies hematochezia, melena or pain  :Denies: dysuria, frequency/urgency, hematuria, heavy menses, pelvic pain  VAGINAL BLEEDING: occasional staining only    PHYSICAL EXAM:    GEN: [place] Gums appears well, alert and oriented x 3, pleasant and cooperative   NECK: supple, no thyromegaly  LUNGS: normal respiratory effort   ABDOMEN: soft, nondistended  EXTREMITIES: no edema  NEURO: no focal findings   BREASTS: bilaterally no masses, no nipple discharge, no skin changes, warm and soft bilaterally  GYN: external genitalia wnl, no lesions, no rashes, no erythema            Uterus: normal size, nontender, mobile            Cervix closed, no discharge            Adnexa: nontender, nonpalpable bilaterally  LOW TRANSVERSE INCISION WELL APPROXIMATED  NO OPEN AREAS  NO AREAS OF DRAINAGE  NO TENDERNESS  Assessment: Normal Postpartum Exam                           Problem List Items Addressed This Visit        Other    Status post primary low transverse  section      Other Visit Diagnoses     Postpartum exam    -  Primary            Plan:  Contraceptive method of choice is Micronor  Return for annual in 3 months    Corby Hennessy exercises

## 2022-04-04 ENCOUNTER — POSTPARTUM VISIT (OUTPATIENT)
Dept: OBGYN CLINIC | Facility: CLINIC | Age: 27
End: 2022-04-04

## 2022-04-04 VITALS
DIASTOLIC BLOOD PRESSURE: 68 MMHG | BODY MASS INDEX: 24.74 KG/M2 | SYSTOLIC BLOOD PRESSURE: 108 MMHG | WEIGHT: 126 LBS | HEIGHT: 60 IN

## 2022-04-04 DIAGNOSIS — Z98.891 STATUS POST PRIMARY LOW TRANSVERSE CESAREAN SECTION: ICD-10-CM

## 2022-04-04 DIAGNOSIS — R10.2 PELVIC PAIN: ICD-10-CM

## 2022-04-04 DIAGNOSIS — Z30.011 ENCOUNTER FOR INITIAL PRESCRIPTION OF CONTRACEPTIVE PILLS: ICD-10-CM

## 2022-04-04 DIAGNOSIS — Z30.41 SURVEILLANCE OF CONTRACEPTIVE PILL: ICD-10-CM

## 2022-04-04 PROCEDURE — 99024 POSTOP FOLLOW-UP VISIT: CPT | Performed by: NURSE PRACTITIONER

## 2022-04-04 RX ORDER — ACETAMINOPHEN AND CODEINE PHOSPHATE 120; 12 MG/5ML; MG/5ML
1 SOLUTION ORAL DAILY
Qty: 90 TABLET | Refills: 0 | Status: SHIPPED | OUTPATIENT
Start: 2022-04-09

## 2022-04-27 ENCOUNTER — TELEPHONE (OUTPATIENT)
Dept: PERINATAL CARE | Facility: CLINIC | Age: 27
End: 2022-04-27

## 2022-05-12 ENCOUNTER — TELEPHONE (OUTPATIENT)
Dept: PERINATAL CARE | Facility: CLINIC | Age: 27
End: 2022-05-12

## 2022-05-12 NOTE — TELEPHONE ENCOUNTER
Received request, by mail, for medical records from [employer]  Fax'd request to [facility], copy in chart

## 2022-06-17 ENCOUNTER — HOSPITAL ENCOUNTER (OUTPATIENT)
Dept: ULTRASOUND IMAGING | Facility: HOSPITAL | Age: 27
Discharge: HOME | End: 2022-06-17
Payer: COMMERCIAL

## 2022-06-17 DIAGNOSIS — R10.2 PELVIC PAIN: ICD-10-CM

## 2022-06-17 PROCEDURE — 76830 TRANSVAGINAL US NON-OB: CPT

## 2022-06-17 PROCEDURE — 76856 US EXAM PELVIC COMPLETE: CPT

## 2022-09-02 NOTE — PROGRESS NOTES
Problem   Encounter for Supervision of Normal First Pregnancy in Third Trimester    O+  + GBS in urine   Prenatal labs WNL    28 week labs, HGB 12 2 , abnormal 1 hr @ 141, Normal 3 hr   Declines Tdap, flu, &  COVID vaccines   28 week folder given at 30 5 week visit     BP & L&D forms returned  Delivery consent on file        Encounter for supervision of normal first pregnancy in third trimester  Katina Edmond is a 31 y o    38w4d who presents for routine PNV  Not interested at this time in induction, would hope for spontaneous labor   Declines cervical check today   28 week labs reviewed:   TWG 15 kg (33 lb)   Denies OB complaints  Good fetal movement  Denies contractions, cramping, leakage of fluid or vaginal bleeding  Reviewed FKCs     Advised to continue Perineal massage  Pregnancy Essential guide and Baby and Me web site recommended WORK COMP CALLING STATED THAT PROCEDURE IS AUTHORIZED ADVISED WE DO NOT TAKE VERBAL NEED TO FAX AUTH-HE WILL DO  PLEASE LOOK OUT FOR THE AUTH AND ADVISE WHEN SCHEUDLED

## 2022-11-28 ENCOUNTER — ANNUAL EXAM (OUTPATIENT)
Dept: OBGYN CLINIC | Facility: CLINIC | Age: 27
End: 2022-11-28

## 2022-11-28 VITALS
SYSTOLIC BLOOD PRESSURE: 92 MMHG | HEIGHT: 61 IN | BODY MASS INDEX: 24.58 KG/M2 | WEIGHT: 130.2 LBS | DIASTOLIC BLOOD PRESSURE: 66 MMHG

## 2022-11-28 DIAGNOSIS — Z30.41 SURVEILLANCE OF CONTRACEPTIVE PILL: ICD-10-CM

## 2022-11-28 DIAGNOSIS — Z01.419 ENCOUNTER FOR GYNECOLOGICAL EXAMINATION WITHOUT ABNORMAL FINDING: Primary | ICD-10-CM

## 2022-11-28 DIAGNOSIS — Z30.011 ENCOUNTER FOR INITIAL PRESCRIPTION OF CONTRACEPTIVE PILLS: ICD-10-CM

## 2022-11-28 RX ORDER — ACETAMINOPHEN AND CODEINE PHOSPHATE 120; 12 MG/5ML; MG/5ML
1 SOLUTION ORAL DAILY
Qty: 90 TABLET | Refills: 3 | Status: SHIPPED | OUTPATIENT
Start: 2022-11-28 | End: 2022-12-26

## 2022-11-28 RX ORDER — ACETAMINOPHEN AND CODEINE PHOSPHATE 120; 12 MG/5ML; MG/5ML
1 SOLUTION ORAL DAILY
Qty: 90 TABLET | Refills: 3 | Status: SHIPPED | OUTPATIENT
Start: 2022-11-28 | End: 2022-11-28 | Stop reason: SDUPTHER

## 2022-11-28 NOTE — PROGRESS NOTES
Problem List Items Addressed This Visit    None  Visit Diagnoses     Encounter for gynecological examination without abnormal finding    -  Primary    Surveillance of contraceptive pill        Encounter for initial prescription of contraceptive pills        Relevant Medications    norethindrone (Ortho Micronor) 0 35 MG tablet        Calcium/vit d/PNV inclusion in the diet discussed, call with any issues, SBE reinforced, all concerns addressed  Pleasant 28 y o  premenopausal female here for annual exam  She denies any issues with heavy bleeding or her menses  She reports regular monthly cycles that last 5-7 days  She is on Micronor  She is still breast-feeding  She denies history of abnormal pap smears  Last Pap was done on 2021 negative, pap NOT done today  She denies any vaginal issues  She denies pelvic pain or dyspareunia  She denies any issues with her current BCM  Sexually active without any concerns  Gardasils received per pt report  Past Medical History:   Diagnosis Date   • Varicella     had vaccines     Past Surgical History:   Procedure Laterality Date   • CARDIAC SURGERY      age 2   • CHOLECYSTECTOMY  2019   • [place] ANTEPARTUM HEAD MANIPULATION N/A 3/8/2022    Procedure: VERSION EXTERNAL CEPHALIC;  Surgeon: Smitha Garcia MD;  [facility];  Service: Obstetrics   • [place]  DELIVERY ONLY N/A 3/8/2022    Procedure:  SECTION ();   Surgeon: Smitha Garcia MD;  [facility];  Service: Obstetrics     Family History   Problem Relation Age of Onset   • No Known Problems Mother    • No Known Problems Father    • No Known Problems Sister    • No Known Problems Brother    • No Known Problems Maternal Grandfather    • Dementia Paternal Grandmother    • No Known Problems Paternal Grandfather      Social History     Tobacco Use   • Smoking status: Never   • Smokeless tobacco: Never   Vaping Use   • Vaping Use: Never used   Substance Use Topics   • Alcohol use: Not Currently Comment: none with pregnancy   • Drug use: Never     Comment: fob- marijuana in the past       Current Outpatient Medications:   •  Prenatal Vit-Fe Fumarate-FA (PRENATAL PO), Take by mouth, Disp: , Rfl:   •  norethindrone (Ortho Micronor) 0 35 MG tablet, Take 1 tablet (0 35 mg total) by mouth daily for 28 days, Disp: 90 tablet, Rfl: 3  Patient Active Problem List    Diagnosis Date Noted   • Status post primary low transverse  section 2022   • History of congenital heart defect 10/25/2021   • GBS bacteriuria 10/25/2021       No Known Allergies    OB History    Para Term  AB Living   1 1 1     1   SAB IAB Ectopic Multiple Live Births         0 1      # Outcome Date GA Lbr Tyrone/2nd Weight Sex Delivery Anes PTL Lv   1 Term 22 39w5d  3120 g (6 lb 14 1 oz) M CS-LTranv EPI, Spinal  BRIGHT     She works as a  for a charter school in the [facility]  Her son is 6 months old    Vitals:    22 1606   BP: 92/66   BP Location: Left arm   Patient Position: Sitting   Cuff Size: Standard   Weight: 59 1 kg (130 lb 3 2 oz)   Height: 5' 0 5" (1 537 m)     Body mass index is 25 01 kg/m²  Patient's last menstrual period was 2022  Review of Systems   Constitutional: Negative for chills, fatigue, fever and unexpected weight change  Respiratory: Negative for shortness of breath  Gastrointestinal: Negative for anal bleeding, blood in stool, constipation and diarrhea  Genitourinary: Negative for difficulty urinating, dysuria and hematuria  Physical Exam   Constitutional: She appears well-developed and well-nourished  No distress  HENT: atraumatic, EOMI  Head: Normocephalic  Neck: Normal range of motion  Neck supple  Pulmonary: Effort normal   Breasts: bilateral without masses, skin changes or nipple discharge  Bilaterally soft and warm to touch  No areas of erythema or pain  Abdominal: Soft  Pelvic exam was performed with patient supine   No labial fusion  There is no rash, tenderness, lesion or injury on the right labia  There is no rash, tenderness, lesion or injury on the left labia  Urethral meatus does not show any tenderness, inflammation or discharge  Palpation of midline bladder without pain or discomfort  Uterus is not deviated, not enlarged, not fixed and not tender  Cervix exhibits no motion tenderness, no discharge and no friability  Right adnexum displays no mass, no tenderness and no fullness  Left adnexum displays no mass, no tenderness and no fullness  No erythema or tenderness in the vagina  No foreign body in the vagina  No signs of injury around the vagina  No vaginal discharge found  No signs of injury around the vagina or anus  Perineum without lesions, signs of injury, erythema or swelling  Lymphadenopathy:        Right: No inguinal adenopathy present  Left: No inguinal adenopathy present

## 2022-11-28 NOTE — PATIENT INSTRUCTIONS
Birth Control Pills   WHAT YOU NEED TO KNOW:   What are birth control pills? Birth control pills are also called oral contraceptives, or the pill  It is medicine that helps prevent pregnancy by stopping ovulation  Ovulation is when the ovaries make and release an egg cell each month  If this egg gets fertilized by sperm, pregnancy occurs  You will need to take the pill at the same time every day  Your healthcare provider will tell you when to start taking the pill  You will also be told what to do if you miss a dose  Instructions will depend on the kind of birth control pills you are taking  What are the different kinds of birth control pills? Some kinds are taken for 21 days in a row, followed by 7 days of placebo (no hormones) pills  Other kinds are taken for 24 days followed by 4 days of placebos  Each kind has a certain amount of female hormones  Your provider will decide on the kind that is best for you based on your age and other health conditions  What may be done before I can start taking birth control pills? You need to see your healthcare provider to get a prescription  Any of the following may be done before your healthcare provider gives you a prescription:  Your healthcare provider will ask about diseases and illnesses you have had in the past  Your provider will check your risk for blood clots, heart conditions, or stroke  Tell your provider if you had gastric bypass surgery  This surgery can affect the way your body absorbs medicines such as birth control pills  Your provider will also check your blood pressure, and may do a breast and pelvic exam  A Pap smear may also be done during the pelvic exam  This is a test to make sure you do not have abnormal changes on your cervix  You may need other tests, such as a urine test to make sure you are not pregnant  Your provider will ask if you take any medicines and if you smoke   Smoking increases your risk for stroke, heart attack, or a blood clot in your lungs  If you smoke, you should not take certain kinds of birth control pills  What are the advantages of birth control pills? When birth control pills are used correctly, the chances of getting pregnant are very low  Birth control pills may help decrease bleeding and pain during your monthly period  They may also help prevent cancer of the uterus and ovaries  What are the disadvantages of birth control pills? You may have sudden changes in your mood or feelings while you take birth control pills  You may have nausea and a decreased sex drive  You may have an increased appetite and rapid weight gain  You may also have bleeding in between periods, less frequent periods, vaginal dryness, and breast pain  Birth control pills will not protect you from sexually transmitted infections  Rarely, some birth control pills can increase your risk for a blood clot  This may become life-threatening  What should I do if I decide I want to get pregnant? If you are planning to have a baby, ask your healthcare provider when you may stop taking your birth control pills  It may take some time for you to start ovulating again  Ask your healthcare provider for more information about pregnancy after birth control pills  When should I start taking birth control pills after I have a baby? If you are not breastfeeding, you may start taking birth control pills 3 weeks after you give birth  You may be able to take certain types of birth control pills if you are breastfeeding  These pills can be started from 6 weeks to 6 months after you give birth  Ask your healthcare provider for more information about when to start taking birth control pills after you give birth  What do I need to know about birth control pills and menopause? Talk with your healthcare provider if you want to take birth control pills around menopause  Around age 39, you will enter into perimenopause   This means your hormone levels are dropping and you are ovulating less often  You can still become pregnant during this time  The risk for problems, such as miscarriage, are higher if you become pregnant after age 39  Birth control pills will prevent pregnancy, and may also help prevent or relieve some signs and symptoms of menopause  Examples are hot flashes and mood swings  Your provider will do tests when you are around age 48  The tests may show that you are in menopause  If the tests do not show menopause for sure, you may be able to continue taking the pill up to age 54  The decision will depend on your health and if you have any medical conditions, such as a blood clot  Call your local emergency number (911 in the [address]) for any of the following: You have any of the following signs of a stroke:      Numbness or drooping on one side of your face     Weakness in an arm or leg    Confusion or difficulty speaking    Dizziness, a severe headache, or vision loss    You feel lightheaded, short of breath, and have chest pain  You cough up blood  When should I seek immediate care? Your arm or leg feels warm, tender, and painful  It may look swollen and red  You have severe pain, numbness, or swelling in your arms or legs  When should I call my doctor? You have forgotten to take a birth control pill  You have mood changes, such as depression, since starting birth control pills  You have nausea or are vomiting  You have severe abdominal pain  You missed a period and have questions or concerns about being pregnant  You still have bleeding 4 months after taking birth control pills correctly  You have questions or concerns about your condition or care  CARE AGREEMENT:   You have the right to help plan your care  Learn about your health condition and how it may be treated  Discuss treatment options with your healthcare providers to decide what care you want to receive  You always have the right to refuse treatment   The above information is an  only  It is not intended as medical advice for individual conditions or treatments  Talk to your doctor, nurse or pharmacist before following any medical regimen to see if it is safe and effective for you  © Copyright [facility] 2022 Information is for End User's use only and may not be sold, redistributed or otherwise used for commercial purposes   All illustrations and images included in CareNotes® are the copyrighted property of [employer]  or [address] [place] [address]

## 2023-05-18 ENCOUNTER — TELEPHONE (OUTPATIENT)
Age: 28
End: 2023-05-18

## 2023-05-18 NOTE — TELEPHONE ENCOUNTER
NP calling about brown spots that just appeared on her side, no hx of skin ca  Informed her of end of Nov timeframe for NP and to have evaluation by pcp and then contact the office if needs f/u

## 2023-06-06 ENCOUNTER — OFFICE VISIT (OUTPATIENT)
Dept: FAMILY MEDICINE CLINIC | Facility: CLINIC | Age: 28
End: 2023-06-06
Payer: COMMERCIAL

## 2023-06-06 VITALS
HEART RATE: 87 BPM | OXYGEN SATURATION: 99 % | BODY MASS INDEX: 22.66 KG/M2 | WEIGHT: 136 LBS | HEIGHT: 65 IN | SYSTOLIC BLOOD PRESSURE: 110 MMHG | DIASTOLIC BLOOD PRESSURE: 68 MMHG

## 2023-06-06 DIAGNOSIS — Z00.00 HEALTHCARE MAINTENANCE: ICD-10-CM

## 2023-06-06 DIAGNOSIS — R23.9 SKIN CHANGE: ICD-10-CM

## 2023-06-06 DIAGNOSIS — Z87.74 HISTORY OF CONGENITAL HEART DEFECT: ICD-10-CM

## 2023-06-06 DIAGNOSIS — Z86.2 HISTORY OF ANEMIA: ICD-10-CM

## 2023-06-06 DIAGNOSIS — Z76.89 ENCOUNTER TO ESTABLISH CARE: Primary | ICD-10-CM

## 2023-06-06 PROBLEM — Z80.3 FAMILY HISTORY OF BREAST CANCER: Status: ACTIVE | Noted: 2023-06-06

## 2023-06-06 PROCEDURE — 99204 OFFICE O/P NEW MOD 45 MIN: CPT

## 2023-06-06 NOTE — PROGRESS NOTES
Depression Screening and Follow-up Plan: Patient was screened for depression during today's encounter  They screened negative with a PHQ-2 score of 0  Assessment/Plan:         Problem List Items Addressed This Visit        Other    History of congenital heart defect     Stable , lost to follow-up with cardiology at age of 14         Relevant Orders    CBC and differential    Comprehensive metabolic panel    History of anemia     Continue with prenatal vitamins  Obtain blood work  Relevant Orders    Iron Panel (Includes Ferritin, Iron Sat%, Iron, and TIBC)   Other Visit Diagnoses     Encounter to establish care    -  Primary    Relevant Orders    CBC and differential    Comprehensive metabolic panel    TSH, 3rd generation with Free T4 reflex    Vitamin D 25 hydroxy    Healthcare maintenance        Relevant Orders    CBC and differential    Comprehensive metabolic panel    TSH, 3rd generation with Free T4 reflex    Vitamin D 25 hydroxy    Skin change        Relevant Orders    Ambulatory Referral to Dermatology    CBC and differential    Comprehensive metabolic panel    TSH, 3rd generation with Free T4 reflex    Vitamin D 25 hydroxy            Subjective:      Patient ID: Roshan Rodriguez is a 33 y o  female  Patient presents to the office in order to establish care  Has concern regarding changes on the left side of her chest        The following portions of the patient's history were reviewed and updated as appropriate:   Past Medical History:  She has a past medical history of Varicella  ,  _______________________________________________________________________  Medical Problems:  does not have any pertinent problems on file ,  _______________________________________________________________________  Past Surgical History:   has a past surgical history that includes Cholecystectomy ();  Cardiac surgery; pr external cephalic version w/wo tocolysis (N/A, 3/8/2022); and pr  delivery only (N/A, "3/8/2022)  ,  _______________________________________________________________________  Family History:  family history includes Dementia in her paternal grandmother; No Known Problems in her brother, father, maternal grandfather, mother, paternal grandfather, and sister  ,  _______________________________________________________________________  Social History:   reports that she has never smoked  She has never used smokeless tobacco  She reports that she does not currently use alcohol  She reports that she does not use drugs  ,  _______________________________________________________________________  Allergies:  has No Known Allergies     _______________________________________________________________________  Current Outpatient Medications   Medication Sig Dispense Refill   • Prenatal Vit-Fe Fumarate-FA (PRENATAL PO) Take by mouth       No current facility-administered medications for this visit      _______________________________________________________________________  Review of Systems   Constitutional: Negative for chills and fever  Respiratory: Negative for cough and shortness of breath  Cardiovascular: Negative for chest pain  Gastrointestinal: Negative for abdominal pain and vomiting  Genitourinary: Negative for dysuria  Musculoskeletal: Negative for arthralgias and back pain  Skin: Positive for color change  Neurological: Negative for headaches  All other systems reviewed and are negative  Objective:  Vitals:    06/06/23 1627   BP: 110/68   Pulse: 87   SpO2: 99%   Weight: 61 7 kg (136 lb)   Height: 5' 5\" (1 651 m)     Body mass index is 22 63 kg/m²  Physical Exam  Vitals and nursing note reviewed  Constitutional:       General: She is not in acute distress  Appearance: Normal appearance  She is not ill-appearing  Cardiovascular:      Rate and Rhythm: Normal rate and regular rhythm  Heart sounds: Normal heart sounds     Pulmonary:      Effort: Pulmonary effort is normal  " "     Breath sounds: Normal breath sounds  Musculoskeletal:         General: Normal range of motion  Skin:     General: Skin is warm and dry  Findings: Lesion present  Neurological:      Mental Status: She is alert and oriented to person, place, and time  Psychiatric:         Mood and Affect: Mood normal          Behavior: Behavior normal          Thought Content: Thought content normal          Judgment: Judgment normal                Portions of the above record have been created with voice recognition software  Occasional wrong word or \"sound alike\" substitution may have occurred due to the inherent limitations of voice recognition software  Read the chart carefully and recognize, using context, where substitution may have occurred    "

## 2023-06-10 ENCOUNTER — APPOINTMENT (OUTPATIENT)
Dept: LAB | Facility: HOSPITAL | Age: 28
End: 2023-06-10
Payer: COMMERCIAL

## 2023-06-10 DIAGNOSIS — Z86.2 HISTORY OF ANEMIA: ICD-10-CM

## 2023-06-10 DIAGNOSIS — R23.9 SKIN CHANGE: ICD-10-CM

## 2023-06-10 DIAGNOSIS — Z00.00 HEALTHCARE MAINTENANCE: ICD-10-CM

## 2023-06-10 DIAGNOSIS — Z76.89 ENCOUNTER TO ESTABLISH CARE: ICD-10-CM

## 2023-06-10 DIAGNOSIS — Z87.74 HISTORY OF CONGENITAL HEART DEFECT: ICD-10-CM

## 2023-06-10 LAB
25(OH)D3 SERPL-MCNC: 23.6 NG/ML (ref 30–100)
ALBUMIN SERPL BCP-MCNC: 4.7 G/DL (ref 3.5–5)
ALP SERPL-CCNC: 56 U/L (ref 34–104)
ALT SERPL W P-5'-P-CCNC: 13 U/L (ref 7–52)
ANION GAP SERPL CALCULATED.3IONS-SCNC: 7 MMOL/L (ref 4–13)
AST SERPL W P-5'-P-CCNC: 17 U/L (ref 13–39)
BASOPHILS # BLD AUTO: 0.03 THOUSANDS/ÂΜL (ref 0–0.1)
BASOPHILS NFR BLD AUTO: 1 % (ref 0–1)
BILIRUB SERPL-MCNC: 0.99 MG/DL (ref 0.2–1)
BUN SERPL-MCNC: 18 MG/DL (ref 5–25)
CALCIUM SERPL-MCNC: 8.9 MG/DL (ref 8.4–10.2)
CHLORIDE SERPL-SCNC: 102 MMOL/L (ref 96–108)
CO2 SERPL-SCNC: 26 MMOL/L (ref 21–32)
CREAT SERPL-MCNC: 0.61 MG/DL (ref 0.6–1.3)
EOSINOPHIL # BLD AUTO: 0.1 THOUSAND/ÂΜL (ref 0–0.61)
EOSINOPHIL NFR BLD AUTO: 2 % (ref 0–6)
ERYTHROCYTE [DISTWIDTH] IN BLOOD BY AUTOMATED COUNT: 12.4 % (ref 11.6–15.1)
FERRITIN SERPL-MCNC: 71 NG/ML (ref 11–307)
GFR SERPL CREATININE-BSD FRML MDRD: 122 ML/MIN/1.73SQ M
GLUCOSE P FAST SERPL-MCNC: 82 MG/DL (ref 65–99)
HCT VFR BLD AUTO: 42.2 % (ref 34.8–46.1)
HGB BLD-MCNC: 14.2 G/DL (ref 11.5–15.4)
IMM GRANULOCYTES # BLD AUTO: 0.01 THOUSAND/UL (ref 0–0.2)
IMM GRANULOCYTES NFR BLD AUTO: 0 % (ref 0–2)
IRON SATN MFR SERPL: 12 % (ref 15–50)
IRON SERPL-MCNC: 33 UG/DL (ref 50–170)
LYMPHOCYTES # BLD AUTO: 1.25 THOUSANDS/ÂΜL (ref 0.6–4.47)
LYMPHOCYTES NFR BLD AUTO: 28 % (ref 14–44)
MCH RBC QN AUTO: 31.1 PG (ref 26.8–34.3)
MCHC RBC AUTO-ENTMCNC: 33.6 G/DL (ref 31.4–37.4)
MCV RBC AUTO: 93 FL (ref 82–98)
MONOCYTES # BLD AUTO: 0.58 THOUSAND/ÂΜL (ref 0.17–1.22)
MONOCYTES NFR BLD AUTO: 13 % (ref 4–12)
NEUTROPHILS # BLD AUTO: 2.53 THOUSANDS/ÂΜL (ref 1.85–7.62)
NEUTS SEG NFR BLD AUTO: 56 % (ref 43–75)
NRBC BLD AUTO-RTO: 0 /100 WBCS
PLATELET # BLD AUTO: 252 THOUSANDS/UL (ref 149–390)
PMV BLD AUTO: 10.4 FL (ref 8.9–12.7)
POTASSIUM SERPL-SCNC: 3.8 MMOL/L (ref 3.5–5.3)
PROT SERPL-MCNC: 7.4 G/DL (ref 6.4–8.4)
RBC # BLD AUTO: 4.56 MILLION/UL (ref 3.81–5.12)
SODIUM SERPL-SCNC: 135 MMOL/L (ref 135–147)
TIBC SERPL-MCNC: 283 UG/DL (ref 250–450)
TSH SERPL DL<=0.05 MIU/L-ACNC: 1.99 UIU/ML (ref 0.45–4.5)
WBC # BLD AUTO: 4.5 THOUSAND/UL (ref 4.31–10.16)

## 2023-06-10 PROCEDURE — 84443 ASSAY THYROID STIM HORMONE: CPT

## 2023-06-10 PROCEDURE — 85025 COMPLETE CBC W/AUTO DIFF WBC: CPT

## 2023-06-10 PROCEDURE — 83540 ASSAY OF IRON: CPT

## 2023-06-10 PROCEDURE — 83550 IRON BINDING TEST: CPT

## 2023-06-10 PROCEDURE — 82306 VITAMIN D 25 HYDROXY: CPT

## 2023-06-10 PROCEDURE — 82728 ASSAY OF FERRITIN: CPT

## 2023-06-10 PROCEDURE — 80053 COMPREHEN METABOLIC PANEL: CPT

## 2023-06-10 PROCEDURE — 36415 COLL VENOUS BLD VENIPUNCTURE: CPT

## 2023-06-12 ENCOUNTER — TELEPHONE (OUTPATIENT)
Dept: OTHER | Facility: OTHER | Age: 28
End: 2023-06-12

## 2023-06-12 NOTE — TELEPHONE ENCOUNTER
Patient returned call for lab results and PCP recommendations for supplements  Please follow up with patient

## 2023-06-26 ENCOUNTER — OFFICE VISIT (OUTPATIENT)
Age: 28
End: 2023-06-26
Payer: COMMERCIAL

## 2023-06-26 VITALS — WEIGHT: 128 LBS | BODY MASS INDEX: 25.13 KG/M2 | HEIGHT: 60 IN

## 2023-06-26 DIAGNOSIS — R23.9 SKIN CHANGE: ICD-10-CM

## 2023-06-26 DIAGNOSIS — Z13.89 SCREENING FOR SKIN CONDITION: ICD-10-CM

## 2023-06-26 DIAGNOSIS — B36.0 TINEA VERSICOLOR: Primary | ICD-10-CM

## 2023-06-26 RX ORDER — IRON,CARBONYL/ASCORBIC ACID 65MG-125MG
TABLET, DELAYED RELEASE (ENTERIC COATED) ORAL
COMMUNITY

## 2023-06-26 RX ORDER — KETOCONAZOLE 20 MG/G
CREAM TOPICAL 2 TIMES DAILY
Qty: 15 G | Refills: 2 | Status: SHIPPED | OUTPATIENT
Start: 2023-06-26

## 2023-06-26 NOTE — PROGRESS NOTES
"[facility]     Patient Name: Serina Pedro  Encounter Date: June 26, 2023     Have you been cared for by a [place] [address] Dermatologist in the last 3 years and, if so, which description applies to you? NO  I am considered a \"new\" patient and must complete all patient intake questions  I am FEMALE/of child-bearing potential     REVIEW OF SYSTEMS:  Have you recently had or currently have any of the following? · Recent fever or chills? No  · Any non-healing wound? No  · Are you pregnant or planning to become pregnant? No  · Are you currently or planning to be nursing or breast feeding? YES, breastfeeding   PAST MEDICAL HISTORY:  Have you personally ever had or currently have any of the following? If \"YES,\" then please provide more detail  · Skin cancer (such as Melanoma, Basal Cell Carcinoma, Squamous Cell Carcinoma? No  · Tuberculosis, HIV/AIDS, Hepatitis B or C: No  · Systemic Immunosuppression such as Diabetes, Biologic or Immunotherapy, Chemotherapy, Organ Transplantation, Bone Marrow Transplantation No  · Radiation Treatment No   FAMILY HISTORY:  Any \"first degree relatives\" (parent, brother, sister, or child) with the following? • Skin Cancer, Pancreatic or Other Cancer? YES, Maternal Aunt - Breast Cancer   PATIENT EXPERIENCE:    • Do you want the Dermatologist to perform a COMPLETE skin exam today including a clinical examination under the \"bra and underwear\" areas? Yes  • If necessary, do we have your permission to call and leave a detailed message on your Preferred Phone number that includes your specific medical information?   Yes      No Known Allergies   Current Outpatient Medications:   •  Iron-Vitamin C (Vitron-C)  MG TABS, Take by mouth, Disp: , Rfl:   •  Prenatal Vit-Fe Fumarate-FA (PRENATAL PO), Take by mouth (Patient not taking: Reported on 6/26/2023), Disp: , Rfl:           • Whom besides the patient is providing clinical information about today's encounter?   o NO " ADDITIONAL HISTORIAN (patient alone provided history)    Physical Exam and Assessment/Plan by Diagnosis:    33Year Old Female presents today for a skin check  Concerned about two round spots that appear under her left breast for the last two months  TINEA VERSICOLOR    Physical Exam:  • Anatomic Location Affected:  Left Inframammary Area  • Morphological Description:  Tannish, scaling macules x2   • Pertinent Positives: KOH positive  • Pertinent Negatives: Additional History of Present Condition:  Two spots presents for two months  Assessment and Plan:  Based on a thorough discussion of this condition and the management approach to it (including a comprehensive discussion of the known risks, side effects and potential benefits of treatment), the patient (family) agrees to implement the following specific plan:  • Ketoconazole 2% Cream, Twice Daily to the affected area until gone  • If it spreads, the call and we will prescribe Ketoconazole Shampoo for the whole body  What is tinea versicolor? Tinea versicolor or pityriasis versicolor is a type of fungal infection on the skin due to a yeast that lives on all of us  It Is due an overgrowth of a type of yeast called Malassezia furfur, which feeds on oils in the skin and thrives in warm, humid environments  Anyone can develop tinea versicolor, but it is more common during the summer months and in tropical climates  Those who tend to sweat more heavily are also at higher risk  Although it is not considered infectious, multiple family members can be affected  - Teens and young adults are most susceptible due to having oily skin   - Affects people of all skin colors   - Weakened immune system predisposes to development     What are the clinical symptoms of tinea versicolor? The first sign of tinea versicolor is often spots on the skin  They can be lighter or darker than surrounding skin, with colors ranging from white, pink, tan, to brown  - The spots are dry, scaly, and sometimes itchy   - Can appear anywhere on the body, but more commonly over the neck, trunk, and arms   - Spots can grow together forming larger patches   - May disappear when temperature drops and return once it becomes warm again  - Pale spots can be confused with vitiligo    How do we diagnose tinea versicolor? Tinea versicolor is usually diagnosed with a history and physical examination  However, the following tests may be useful for confirmation when in doubt  - Wood lamp (black light) examination-- yellow-green glow may be observed in affected areas  - Microscopy using potassium hydroxide (KOH) to examine skin scrapings  - Fungal culture--this is usually reported to be negative, as it is quite difficult to persuade the yeasts to grow in a laboratory  - Skin biopsy--fungal elements may be seen within the outer cells of the skin (stratum corneum) on histopathology    How do we treat tinea versicolor? There are many different options to treat tinea versicolor  The treatment chosen may depend on how thick the spots have grown and how much of the body has been affected  Mild tinea versicolor can be treated with primarily topical antifungal agents  These include:  - Ketoconazole cream/shampoo  - Selenium sulfide   - Terbinafine gel   - Ciclopirox cream/solution   - Propylene glycol solution   - Sodium thiosulphate solution   Topical medications should be applied widely to affected areas before bedtime for between three days to two weeks depending on your dermatologists recommendation    - Use of medicated cleansers once or twice a month may prevent recurrence in those who have has multiple bouts of yeast overgrowth     For extensive skin involvement or after failure of topical medications, oral antifungal agents such as itraconazole and fluconazole can be used   Oral terbinafine used to treat dermatophyte infections is not effective against tinea versicolor    - Vigorous exercise an hour after taking the medication may help sweat it onto the skin surface and enhance clearance of the yeast      Scribe Attestation    I,:  Robin Nathan MA am acting as a scribe while in the presence of the attending physician :       I,:  aGdiel Parker MD personally performed the services described in this documentation    as scribed in my presence :

## 2023-06-26 NOTE — PATIENT INSTRUCTIONS
TINEA VERSICOLOR    Assessment and Plan:  Based on a thorough discussion of this condition and the management approach to it (including a comprehensive discussion of the known risks, side effects and potential benefits of treatment), the patient (family) agrees to implement the following specific plan:  Ketoconazole 2% Cream, Twice Daily to the affected area until gone  If it spreads, the call and we will prescribe Ketoconazole Shampoo for the whole body  What is tinea versicolor? Tinea versicolor or pityriasis versicolor is a type of fungal infection on the skin due to a yeast that lives on all of us  It Is due an overgrowth of a type of yeast called Malassezia furfur, which feeds on oils in the skin and thrives in warm, humid environments  Anyone can develop tinea versicolor, but it is more common during the summer months and in tropical climates  Those who tend to sweat more heavily are also at higher risk  Although it is not considered infectious, multiple family members can be affected  Teens and young adults are most susceptible due to having oily skin   Affects people of all skin colors   Weakened immune system predisposes to development     What are the clinical symptoms of tinea versicolor? The first sign of tinea versicolor is often spots on the skin  They can be lighter or darker than surrounding skin, with colors ranging from white, pink, tan, to brown  The spots are dry, scaly, and sometimes itchy   Can appear anywhere on the body, but more commonly over the neck, trunk, and arms   Spots can grow together forming larger patches   May disappear when temperature drops and return once it becomes warm again  Pale spots can be confused with vitiligo    How do we diagnose tinea versicolor? Tinea versicolor is usually diagnosed with a history and physical examination  However, the following tests may be useful for confirmation when in doubt    Wood lamp (black light) examination-- yellow-green glow may be observed in affected areas  Microscopy using potassium hydroxide (KOH) to examine skin scrapings  Fungal culture--this is usually reported to be negative, as it is quite difficult to persuade the yeasts to grow in a laboratory  Skin biopsy--fungal elements may be seen within the outer cells of the skin (stratum corneum) on histopathology    How do we treat tinea versicolor? There are many different options to treat tinea versicolor  The treatment chosen may depend on how thick the spots have grown and how much of the body has been affected  Mild tinea versicolor can be treated with primarily topical antifungal agents  These include:  Ketoconazole cream/shampoo  Selenium sulfide   Terbinafine gel   Ciclopirox cream/solution   Propylene glycol solution   Sodium thiosulphate solution   Topical medications should be applied widely to affected areas before bedtime for between three days to two weeks depending on your dermatologists recommendation  Use of medicated cleansers once or twice a month may prevent recurrence in those who have has multiple bouts of yeast overgrowth     For extensive skin involvement or after failure of topical medications, oral antifungal agents such as itraconazole and fluconazole can be used  Oral terbinafine used to treat dermatophyte infections is not effective against tinea versicolor     Vigorous exercise an hour after taking the medication may help sweat it onto the skin surface and enhance clearance of the yeast

## 2023-10-03 ENCOUNTER — ULTRASOUND (OUTPATIENT)
Dept: OBGYN CLINIC | Facility: CLINIC | Age: 28
End: 2023-10-03
Payer: COMMERCIAL

## 2023-10-03 VITALS — BODY MASS INDEX: 26.42 KG/M2 | SYSTOLIC BLOOD PRESSURE: 102 MMHG | DIASTOLIC BLOOD PRESSURE: 60 MMHG | WEIGHT: 136.4 LBS

## 2023-10-03 DIAGNOSIS — N91.1 AMENORRHEA, SECONDARY: Primary | ICD-10-CM

## 2023-10-03 PROCEDURE — 99213 OFFICE O/P EST LOW 20 MIN: CPT | Performed by: STUDENT IN AN ORGANIZED HEALTH CARE EDUCATION/TRAINING PROGRAM

## 2023-10-03 PROCEDURE — 76817 TRANSVAGINAL US OBSTETRIC: CPT | Performed by: STUDENT IN AN ORGANIZED HEALTH CARE EDUCATION/TRAINING PROGRAM

## 2023-10-04 ENCOUNTER — TELEPHONE (OUTPATIENT)
Facility: HOSPITAL | Age: 28
End: 2023-10-04

## 2023-10-04 NOTE — TELEPHONE ENCOUNTER
Called patient to schedule MFM appointment, based on referral issued to Maternal Fetal Medicine by [facility] office. Left voicemail requesting patient to call back and schedule appointment, with office number for return call [phone].

## 2023-10-10 ENCOUNTER — INITIAL PRENATAL (OUTPATIENT)
Dept: OBGYN CLINIC | Facility: CLINIC | Age: 28
End: 2023-10-10

## 2023-10-10 VITALS — BODY MASS INDEX: 26.7 KG/M2 | WEIGHT: 136 LBS | HEIGHT: 60 IN

## 2023-10-10 DIAGNOSIS — Z31.430 ENCOUNTER OF FEMALE FOR TESTING FOR GENETIC DISEASE CARRIER STATUS FOR PROCREATIVE MANAGEMENT: ICD-10-CM

## 2023-10-10 DIAGNOSIS — Z36.9 UNSPECIFIED ANTENATAL SCREENING: ICD-10-CM

## 2023-10-10 DIAGNOSIS — Z34.81 PRENATAL CARE, SUBSEQUENT PREGNANCY, FIRST TRIMESTER: Primary | ICD-10-CM

## 2023-10-10 PROCEDURE — OBC

## 2023-10-10 NOTE — PROGRESS NOTES
OB INTAKE INTERVIEW  Patient is 28 y.o.y.o. who presents for OB intake at 11wks  She is accompanied by herself during this encounter  The father of her baby Kayce Huitron) is involved in the pregnancy and is 39years old.   Last Menstrual Period: 2023  Ultrasound: Measured 10 weeks 3 days on 10/3/2023 by Dr Arturo Borges  Estimated Date of Delivery: 2024 confirmed by 10 week US    Signs/Symptoms of Pregnancy  Current pregnancy symptoms: mild nausea  Constipation no  Headaches no  Cramping/spotting no  PICA cravings no    Diabetes-  Body mass index is 26.56 kg/m². If patient has 1 or more, please order early 1 hour GTT  History of GDM no  BMI >35 no  History of PCOS or current metformin use no  History of LGA/macrosomic infant (4000g/9lbs) no    If patient has 2 or more, please order early 1 hour GTT  BMI>30 no  AMA no  First degree relative with type 2 diabetes no  History of chronic HTN, hyperlipidemia, elevated A1C no  High risk race (, , ,  or ) no    Hypertension- if you answer yes, please order preeclampsia labs (cbc, comprehensive metabolic panel, urine protein creatinine ratio, uric acid)  History of of chronic HTN no  History of gestational HTN no  History of preeclampsia, eclampsia, or HELLP syndrome no  History of diabetes no  History of lupus, autoimmune disease, kidney disease no    Thyroid- if yes order TSH with reflex T4  History of thyroid disease no    Bleeding Disorder or Hx of DVT-patient or first degree relative with history of. Order the following if not done previously.    (Factor V, antithrombin III, prothrombin gene mutation, protein C and S Ag, lupus anticoagulant, anticardiolipin, beta-2 glycoprotein)   no    OB/GYN-  History of abnormal pap smear no       Date of last pap smear 2021  History of HPV no  History of Herpes/HSV no  History of other STI (gonorrhea, chlamydia, trich) no  History of prior  no  History of prior  YESx1  History of  delivery prior to 36 weeks 6 days no  History of blood transfusion no  Ok for blood transfusion YES    Substance screening- if yes outside of tobacco for her or anyone in her home-order urine drug screen  History of tobacco use no  Currently using tobacco no  Currently using alcohol no  Presently using drugs no  Past drug use  no  IV drug use- no  Partner drug use no  Parent/Family drug use mom- clean for 24 yrs    MRSA Screening-   Does the pt have a hx of MRSA? no  If yes- please follow MRSA protocol and obtain a nasal swab for MRSA culture    Immunizations:  Influenza vaccine given this season NO  Discussed Tdap vaccine YES  Discussed COVID Vaccine NO    Genetic/MFM-  Do you or your partner have a history of any of the following in yourselves or first degree relatives? Cystic fibrosis no  Spinal muscular atrophy no  Hemoglobinopathy/Sickle Cell/Thalassemia no  Fragile X Intellectual Disability no    If yes, discuss Carrier Screening and recommend consultation with MFM/genetic counseling and place specific [facility] Referral for. If no, discuss Carrier Screening being completed once in a lifetime as a standard of care lab test along with Nuchal Ultrasound.  Place orders for [id number] and [id number] along with M Referral.  Patient interested YES    Appointment at [facility] made YES    Interview education  [facility] Pregnancy Essentials Book reviewed, discussed and attached to their AVS YES    Nurse/Family Partnership- patient may qualify NO; referral placed NO    Prenatal lab work scripts YES  Extra labs ordered:  carrier    Aspirin/Preeclampsia Screen    Risk Level Risk Factor Recommendation   LOW Prior Uncomplicated full-term delivery YES No Aspirin recommendation        MODERATE Nulliparity no Recommend low-dose aspirin if     BMI>30 no 2 or more moderate risk factors    Family History Preeclampsia (mother/sister) no     35yr old or greater no      or Low Socioeconomic no IVF Pregnancy  no     Personal History Risks (low birth weight, prior adverse preg outcome, >10yr preg interval) no         HIGH History of Preeclampsia no Recommend low-dose aspirin if     Multifetal gestation no 1 or more high risk factors    Chronic HTN no     Type 1 or 2 Diabetes no     Renal Disease no     Autoimmune Disease  no      Contraindications to ASA therapy:  NSAID/ ASA allergy: no  Nasal polyps: no  Asthma with history of ASA induced bronchospasm: no  Relative contraindications:  History of GI bleed: no  Active peptic ulcer disease: no  Severe hepatic dysfunction: no    Patient should be recommended to take ASA 162mg during this pregnancy from 12-36wks to lower her risk of preeclampsia: NO      The patient has a history now or in prior pregnancy notable for:   pt born with heart defect - released at age 14 no follow up- saw high risk last pregnancy and cleared. hx csection. Details that I feel the provider should be aware of: Esther Sahu and Jose Perez are expecting their 2nd baby! Previously delviered with SLOGA. She is doing pretty good, some mild nausea issues. We discussed things to help with that. Advised B6 and unisom. 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 * new SBIRT screening not in system- read all 7 questions to patient and only Yes was her mom which was noted. Mom is 24 yrs clean no referral was entered at this time. PN1 visit scheduled. The patient was oriented to our practice, reviewed delivering physicians and [facility] for Delivery. All questions were answered.     Interviewed by: Nathaly Contreras MA

## 2023-10-10 NOTE — PATIENT INSTRUCTIONS
Congratulations!! Please review our Pregnancy Essential Guide and [facility] L&D Virtual tour from our [employer]. . [facility] Pregnancy Essentials Guide  [facility] ([address] [facility])     [id number] [facility] (Mr. Number)

## 2023-10-17 NOTE — PROGRESS NOTES
Patient presents for Pn1 visit.   LMP-23  MELODIE-24  GA-12w6d    Urine-  Last pap- 21 neg  GC Swab collected today. Pn1 labs not completed  MFM Scheduled   Blue folder given at today's visit and thoroughly reviewed. No LOF, bleeding, cramping or discharge. Nauseous but no vomiting    No questions or concerns for today's visit.

## 2023-10-18 ENCOUNTER — INITIAL PRENATAL (OUTPATIENT)
Dept: OBGYN CLINIC | Facility: CLINIC | Age: 28
End: 2023-10-18

## 2023-10-18 VITALS — DIASTOLIC BLOOD PRESSURE: 64 MMHG | WEIGHT: 138 LBS | SYSTOLIC BLOOD PRESSURE: 110 MMHG | BODY MASS INDEX: 26.95 KG/M2

## 2023-10-18 DIAGNOSIS — Z98.891 STATUS POST PRIMARY LOW TRANSVERSE CESAREAN SECTION: ICD-10-CM

## 2023-10-18 DIAGNOSIS — Z34.81 PRENATAL CARE, SUBSEQUENT PREGNANCY, FIRST TRIMESTER: Primary | ICD-10-CM

## 2023-10-18 DIAGNOSIS — Z3A.12 12 WEEKS GESTATION OF PREGNANCY: ICD-10-CM

## 2023-10-18 DIAGNOSIS — Z87.74 HISTORY OF CONGENITAL HEART DEFECT: ICD-10-CM

## 2023-10-18 PROBLEM — R82.71 GBS BACTERIURIA: Status: RESOLVED | Noted: 2021-10-25 | Resolved: 2023-10-18

## 2023-10-18 LAB
SL AMB  POCT GLUCOSE, UA: NORMAL
SL AMB POCT URINE PROTEIN: NORMAL

## 2023-10-18 PROCEDURE — 87591 N.GONORRHOEAE DNA AMP PROB: CPT | Performed by: PHYSICIAN ASSISTANT

## 2023-10-18 PROCEDURE — 87491 CHLMYD TRACH DNA AMP PROBE: CPT | Performed by: PHYSICIAN ASSISTANT

## 2023-10-18 NOTE — PROGRESS NOTES
33 y.o.  at [id number] [place] [address] with Estimated Date of Delivery: 23 noted. by LMP 23 consistent w/ 1st trimester US. She reports some mild nausea/vomiting and bleeding/cramping. Prenatal labs: reminded to complete    Genetic screening: Scheduled with MFM    OB history: hx LTCS for fetal bradycardia and failed ECV for breech presentation; personal hx of congenital hear defect - PDA     GYN history: Last pap smear 2021 negative. No history of STDs. Past medical history: hx of PDA- repaired at age 2; cleared by cardiology in early teens    Past surgical history: Hx LTCS, cholecystectomy; PDA repair    Social history: Denies tobacco, alcohol, or illicit drug use. Family history:   DVT/PE: none  Cancer: none  Heart disease: none  Stroke: none    Physical exam:     Fetal heart tones: 160    Patient appears well and is not in distress  Neck is supple without masses  Breasts are symmetrical without mass, tenderness, nipple discharge, skin changes or adenopathy. Abdomen is soft and nontender without masses. External genitals are normal without lesions or rashes. Vagina is normal without discharge or bleeding. Cervix is normal without discharge or lesion. Uterus is normal for gestational age. Adnexa are normal, nontender, without palpable mass. Discussed as well during this visit was diet, prenatal vitamins, prenatal visits, lab testing, breast feeding, vaccinations, maternal fetal medicine consultations, and lifestyle.       ASSESSMENT/PLAN   Problem List Items Addressed This Visit       History of congenital heart defect     Pt with hx of PDA repair at age 2         Status post primary low transverse  section     Prefers TOLAC         12 weeks gestation of pregnancy     Continue PNV  Complete lab work  Keep MFM early NT as scheduled          Other Visit Diagnoses       Prenatal care, subsequent pregnancy, first trimester    -  Primary    Relevant Orders    POCT urine dip (Completed)    Chlamydia/GC amplified DNA by PCR            1 - Gonorrhea and Chlamydia cultures obtained today  2 - Pap smear was not done today- due in the postpartum  3 - Prenatal labs - reminded to complete   4 - Genetic screening scheduled   5 - RTO in 4 weeks for routine PN visit    Blue packet given and reviewed     All questions were answered & Augusto Felton expressed understanding.

## 2023-10-19 ENCOUNTER — TELEMEDICINE (OUTPATIENT)
Dept: PERINATAL CARE | Facility: CLINIC | Age: 28
End: 2023-10-19

## 2023-10-19 DIAGNOSIS — Z31.5 ENCOUNTER FOR PROCREATIVE GENETIC COUNSELING: ICD-10-CM

## 2023-10-19 DIAGNOSIS — Z82.79 PARENT OF CHILD WITH CHROMOSOME ABNORMALITY: Primary | ICD-10-CM

## 2023-10-19 DIAGNOSIS — O35.2XX0 HEREDITARY DISEASE IN FAMILY POSSIBLY AFFECTING FETUS, AFFECTING MANAGEMENT OF MOTHER IN PREGNANCY, SINGLE OR UNSPECIFIED FETUS: ICD-10-CM

## 2023-10-19 DIAGNOSIS — Z36.0 ENCOUNTER FOR ANTENATAL SCREENING FOR CHROMOSOMAL ANOMALIES: ICD-10-CM

## 2023-10-19 PROCEDURE — NC001 PR NO CHARGE: Performed by: GENETIC COUNSELOR, MS

## 2023-10-20 LAB
C TRACH DNA SPEC QL NAA+PROBE: NEGATIVE
N GONORRHOEA DNA SPEC QL NAA+PROBE: NEGATIVE

## 2023-10-23 ENCOUNTER — TELEPHONE (OUTPATIENT)
Dept: PERINATAL CARE | Facility: CLINIC | Age: 28
End: 2023-10-23

## 2023-10-23 NOTE — TELEPHONE ENCOUNTER
Called patient and confirmed date of birth. Reviewed that out of pocket cost for the MaterniTGenome is $500 which will still go towards her deductible if she goes through the self pay. She stated that she called her insurance  and they said it should be covered as long as it's coded as being done due to the history of a previous child with a chromosome difference. Confirmed her current insurance information in her MyChart is correct. Patient agreeable to me placing order and will have her blood drawn tomorrow at [facility] after her NT scan.

## 2023-10-24 ENCOUNTER — ROUTINE PRENATAL (OUTPATIENT)
Dept: PERINATAL CARE | Facility: OTHER | Age: 28
End: 2023-10-24
Payer: COMMERCIAL

## 2023-10-24 VITALS
HEART RATE: 99 BPM | BODY MASS INDEX: 26.31 KG/M2 | HEIGHT: 60 IN | DIASTOLIC BLOOD PRESSURE: 60 MMHG | WEIGHT: 134 LBS | SYSTOLIC BLOOD PRESSURE: 90 MMHG

## 2023-10-24 DIAGNOSIS — Z36.82 ENCOUNTER FOR (NT) NUCHAL TRANSLUCENCY SCAN: Primary | ICD-10-CM

## 2023-10-24 DIAGNOSIS — Z3A.13 13 WEEKS GESTATION OF PREGNANCY: ICD-10-CM

## 2023-10-24 DIAGNOSIS — Z98.891 STATUS POST PRIMARY LOW TRANSVERSE CESAREAN SECTION: ICD-10-CM

## 2023-10-24 DIAGNOSIS — Z87.74 HISTORY OF CONGENITAL HEART DEFECT: ICD-10-CM

## 2023-10-24 DIAGNOSIS — N91.1 AMENORRHEA, SECONDARY: ICD-10-CM

## 2023-10-24 PROCEDURE — 76801 OB US < 14 WKS SINGLE FETUS: CPT | Performed by: STUDENT IN AN ORGANIZED HEALTH CARE EDUCATION/TRAINING PROGRAM

## 2023-10-24 PROCEDURE — 76813 OB US NUCHAL MEAS 1 GEST: CPT | Performed by: STUDENT IN AN ORGANIZED HEALTH CARE EDUCATION/TRAINING PROGRAM

## 2023-10-24 PROCEDURE — 99214 OFFICE O/P EST MOD 30 MIN: CPT | Performed by: STUDENT IN AN ORGANIZED HEALTH CARE EDUCATION/TRAINING PROGRAM

## 2023-10-24 PROCEDURE — 36415 COLL VENOUS BLD VENIPUNCTURE: CPT | Performed by: STUDENT IN AN ORGANIZED HEALTH CARE EDUCATION/TRAINING PROGRAM

## 2023-10-24 NOTE — LETTER
October 24, 2023     Elly Anderson, [address] [place], [place] [address]    Patient: Jose Mendenhall   YOB: 1995   Date of Visit: 10/24/2023       Dear Dr. Rekha Yi: Thank you for referring Jose Mendenhall to me for evaluation. Below are my notes for this consultation. If you have questions, please do not hesitate to call me. I look forward to following your patient along with you. Sincerely,        Williams Ceja MD        CC: No Recipients    Williams Ceja MD  10/24/2023  9:13 AM  Sign when Signing Visit  [address]: Ms. Rizwana Galvan was seen today for nuchal translucency ultrasound. See ultrasound report under "OB Procedures" tab. Physical Exam  Constitutional:       General: She is not in acute distress. Appearance: Normal appearance. HENT:      Head: Normocephalic and atraumatic. Eyes:      Extraocular Movements: Extraocular movements intact. Cardiovascular:      Rate and Rhythm: Normal rate. Pulmonary:      Effort: Pulmonary effort is normal. No respiratory distress. Skin:     Findings: No erythema or rash. Neurological:      Mental Status: She is alert and oriented to person, place, and time. Psychiatric:         Mood and Affect: Mood normal.         Behavior: Behavior normal.         Please don't hesitate to contact our office with any concerns or questions.   -Williams Ceja MD

## 2023-10-24 NOTE — PROGRESS NOTES
2 tubes of blood drawn using speckle tops (same tubes as used with [facility]) and sent foe [facility] per [address] [facility].

## 2023-10-24 NOTE — PROGRESS NOTES
Genetic Counseling   High-Risk Gestation Note    Appointment Date:  10/19/23  Referred By: Jeanine Nino,*  YOB: 1995  Partner:  Preston Richardson  Indication for Visit:  personal and/or family history of chromosomal abnormality:  son with 11p15.1-p12 deletion (WAGR syndrome)  Pregnancy History:   Estimated Date of Delivery: 24  Estimated Gestational Age: 13w0d    Virtual Regular Visit    Verification of patient location:    Patient is located at Home in the following state in which I hold an active license [place]      Assessment/Plan:    Problem List Items Addressed This Visit    None  Visit Diagnoses       Parent of child with chromosome abnormality    -  Primary    Hereditary disease in family possibly affecting fetus, affecting management of mother in pregnancy, single or unspecified fetus        Encounter for  screening for chromosomal anomalies        Encounter for procreative genetic counseling                     Reason for visit is   Chief Complaint   Patient presents with    Virtual Regular Visit          Encounter provider Roxanne Tan    Provider located at [address] [address] [phone]      Recent Visits  Date Type Provider Dept   10/23/23 Telephone [facility]   10/19/23 Telemedicine [facility] [address] [place] recent visits within past 7 days and meeting all other requirements  Future Appointments  No visits were found meeting these conditions. Showing future appointments within next 150 days and meeting all other requirements       The patient was identified by name and date of birth. Rafaela Goodwin was informed that this is a telemedicine visit and that the visit is being conducted through the [employer]. She agrees to proceed. .  My office door was closed. No one else was in the room.   She acknowledged consent and understanding of No privacy and security of the video platform. The patient has agreed to participate and understands they can discontinue the visit at any time. Bartolo Snell is a 33 y.o. female who presented for genetic counseling to discuss the above indication. The couple's son Luis was born with a twisted raphe and failed his new born hearing screen (bilaterally). At 6 weeks his pediatrician noticed a red reflex difference on an eye exam and referred him to ophthalmology with [facility] for further evaluation. He was diagnosed with bilateral aniridia and left sided cataract at about two months of age. Ophthalmology ordered genetic testing of the PAX6 gene with exome-wide CNV detection through [facility] due to the concern for WAGR syndrome. 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 People with WAGR syndrome have a 45 to 60 percent chance of developing Wilms tumor, thus Luis was referred to radiology and oncology for monitoring. Nessa's son is currently 21 months of age and has developmental delays but is getting physical, occupational and vision therapy. He follows with genetics, oncology, urology, and ophthalmology. Parental testing for Raul So and Damian Romano for the known deletion was negative, thus confirming that their son has a de bryant deletion. We discussed that the risk to the current pregnancy is then likely to be less than 1%. We briefly reviewed the patient's age related risk for chromosome abnormalities. The risk of Down syndrome at age 27 at delivery is 1/840, and the risk for any chromosomal abnormality at this age is 1/395.   The differences between full chromosome aneuploidies and copy number variants (microdeletions and microduplications typically less than 7Mb in size) was also discussed. We reviewed that copy number variants occur in about 0.4% of all pregnancies. Therefore, for patients under age 39 the risk for copy number variants is higher than the risk for Down syndrome. We discussed the available prenatal testing and screening options for the familial deletion and other chromosome abnormalities. The risks, benefits, and limitations of amniocentesis were discussed with the patient. Amniocentesis is performed starting at 16 weeks gestation, using direct real time ultrasound visualization to avoid both the fetus and the placenta. Once amniotic fluid is withdrawn, laboratory analysis is performed and amniotic fluid alpha-fetoprotein, as well as chromosome and/or microarray analysis is undertaken. The deletion the patient's son has would be picked up by microarray. The risk of genetic amniocentesis includes, but is not limited to less than 1 in 300 pregnancy loss rate or  delivery rate if 23 weeks or greater, infection, bleeding, rupture of membranes, failure of cells to grow, karyotype error, laboratory error, etc.  Occasionally a repeat amniocentesis is necessary due to cell culture failure. Chromosome/microarray analysis from amniocentesis is 99.9% accurate and alpha-fetoprotein analysis can detect approximately 95% of open neural tube defects. Chorionic villus sampling (CVS) is another diagnostic testing option that is available earlier than amniocentesis, between 10-14 weeks gestation. Like amniocentesis, CVS is 99% accurate for detecting chromosomal problems. Unlike amniocentesis, CVS cannot detect alpha-fetoprotein levels in order to determine the risk for open neural tube defects. MSAFP testing would need to be performed at 15-20 weeks gestation for this purpose. The risk of CVS includes, but is not limited to, less than a 1 in 300 risk for pregnancy loss.   There is also a 1% risk for maternal cell contamination and cell culture failure, in which case the CVS would need to be followed-up with amniocentesis. We reviewed the testing option of cell free fetal DNA screening (also known as noninvasive prenatal testing or NIPT). We discussed that it is a serum test to identify fragments of placental DNA in maternal blood. We reviewed the benefits and limitations of cell free fetal DNA screening in detecting Down syndrome, Trisomy 13, Trisomy 25 and sex chromosome aneuploidies. [address] [place] [address] offers a more in depth NIPS screen called MaterniTGenome which examines DNA from all chromosomes and can detect deletions or duplications larger than 7Mb. As such, it would be able to screen for the same or similar deletion that the patient's son has. We discussed the benefits and limitations of this screening and that diagnostic testing is recommended for any abnormal results. As cell free fetal DNA screening does not detect open neural tube defects, MSAFP screening is available at 15-20 weeks gestation. Sequential screening consists of first trimester measurement of nuchal translucency combined with first trimester biochemical analysis, as well as second trimester biochemical analysis. It is able to detect approximately 95% of pregnancies in which the fetus has Down syndrome, 90% of pregnancies in which the fetus has trisomy 25 and 80% of pregnancies which the fetus has an open neural tube defect. It can also indirectly identify other chromosomal abnormalities, copy number variants, genetic syndromes, or adverse pregnancy outcomes if serum analyte levels are abnormal.    We discussed the availability of an ultrasound between 11-14 weeks gestation to measure the nuchal translucency (NT), which can assess for chromosome abnormalities, cardiac defects, and other adverse pregnancy outcomes. We reviewed that level II anatomy ultrasound is typically performed at approximately 20 weeks gestation.   Level II ultrasound evaluation is between 60-80% accurate in detecting major physical birth defects and variations in fetal development that may be associated with chromosome/genetic abnormalities. Level II ultrasound evaluation is not able to detect all birth defects or health problems. Patient has a congenital heart defect (see below) thus the benefits and limitations of fetal echocardiogram were discussed. This ultrasound is typically performed between 22-24 weeks gestation. After discussing the available prenatal screening and testing options Ame elected to pursue cell free fetal DNA screening with [employer]. We discussed that out of pocket cost if not covered by insurance is $500 which she was agreeable to. Financial assistance and payment plans are available. Ame will have her blood drawn at the time of her NT ultrasound. Results take approximately 7-10 days. She was informed that the results will disclose the fetal sex and will be available in her MyChart to review. The patient declined CVS and amniocentesis secondary to procedural related complications. She may reconsider diagnostic testing should the cell free fetal DNA screening come back abnormal.  Ame is also planning on pursuing NT ultrasound, MSAFP screening, Level II ultrasound, and fetal echocardiogram at the appropriate times. Histories for the patient and her partner's family were taken during our session. Ame has a personal diagnosis of a patent ductus arteriosus which required a coil placement. She has not required annual cardiology follow up since age 14. We discussed that various research studies in the literature indicate a recurrence risk for the pregnancy of about 3-15% though we discussed it is likely to be closer to 3-5%. Ame did have a fetal echocardiogram in her prior pregnancy which was within normal limits.   Further review of family history for the patient and her partner was noncontributory. The family history was not significant for other genetic diseases or disorders, intellectual disability, birth defects, fetal loss, or consanguinity. Patient reports being of [place] /[place] descent and that her  is of  /Chinese descent. She denies either of them having known Ashkenazi Manda[place] ancestry. The benefits and limitations of Cystic fibrosis (CF) (already ordered), Spinal muscular atrophy (SMA) (already ordered), hemoglobinopathy, and expanded carrier screening was discussed. The patient was unsure of pursuing carrier screening at this time and elected to further consider the options ([employer] message sent). Should she decide to have any of the testing performed she will contact our office or her OB office. Lastly, we discussed the fact that everyone in the general population regardless of age, family history, or medical background has approximately a 3-5% risk of having a child with some type of congenital anomaly, genetic disease or intellectual disability. Currently there are no tests available to rule out all birth defects or health problems. Christopher Valadez was provided with our contact information. I encouraged her to call with any questions or concerns. Plan/Tests Ordered:  1) Patient declined CVS, amniocentesis, and sequential screen. 2) Patient elected MaterniTGenome - to be drawn at time of NT ultrasound. 3) NT ultrasound scheduled for 10/24/23. 4) MSAFP screening at 15-20 weeks gestation - to be ordered by patient OB office. 5) Level II anatomy ultrasound at approximately 20 weeks gestation. 6) Fetal echocardiogram at 22-24 weeks gestation.       HPI     Past Medical History:   Diagnosis Date    Heart valve disease     congenital patent ductus arteriosus    Varicella     had vaccines       Past Surgical History:   Procedure Laterality Date    CARDIAC SURGERY      age 2    CHOLECYSTECTOMY  64    [place]  DELIVERY ONLY N/A 2022 Procedure:  SECTION (); Surgeon: Chris Bates MD;  Location: [facility] ;  Service: Obstetrics    ME EXTERNAL CEPHALIC VERSION W/WO TOCOLYSIS N/A 2022    Procedure: VERSION EXTERNAL CEPHALIC;  Surgeon: Chris Bates MD;  Location: [facility] ;  Service: Obstetrics       Current Outpatient Medications   Medication Sig Dispense Refill    Iron-Vitamin C (Vitron-C)  MG TABS Take by mouth      Prenatal Vit-Fe Fumarate-FA (PRENATAL PO) Take by mouth       No current facility-administered medications for this visit. No Known Allergies    Review of Systems    Video Exam    There were no vitals filed for this visit.     Physical Exam     Visit Time  Total Visit Duration: 55 minutes

## 2023-10-24 NOTE — PROGRESS NOTES
[address]: Ms. Ed Nichole was seen today for nuchal translucency ultrasound. See ultrasound report under "OB Procedures" tab. Physical Exam  Constitutional:       General: She is not in acute distress. Appearance: Normal appearance. HENT:      Head: Normocephalic and atraumatic. Eyes:      Extraocular Movements: Extraocular movements intact. Cardiovascular:      Rate and Rhythm: Normal rate. Pulmonary:      Effort: Pulmonary effort is normal. No respiratory distress. Skin:     Findings: No erythema or rash. Neurological:      Mental Status: She is alert and oriented to person, place, and time. Psychiatric:         Mood and Affect: Mood normal.         Behavior: Behavior normal.         Please don't hesitate to contact our office with any concerns or questions.   -Jerrod Case MD

## 2023-11-01 ENCOUNTER — PATIENT MESSAGE (OUTPATIENT)
Dept: OBGYN CLINIC | Facility: CLINIC | Age: 28
End: 2023-11-01

## 2023-11-11 ENCOUNTER — APPOINTMENT (OUTPATIENT)
Dept: LAB | Facility: HOSPITAL | Age: 28
End: 2023-11-11
Payer: COMMERCIAL

## 2023-11-11 DIAGNOSIS — Z34.81 PRENATAL CARE, SUBSEQUENT PREGNANCY, FIRST TRIMESTER: Primary | ICD-10-CM

## 2023-11-11 DIAGNOSIS — Z31.430 ENCOUNTER OF FEMALE FOR TESTING FOR GENETIC DISEASE CARRIER STATUS FOR PROCREATIVE MANAGEMENT: ICD-10-CM

## 2023-11-11 DIAGNOSIS — Z36.9 UNSPECIFIED ANTENATAL SCREENING: ICD-10-CM

## 2023-11-11 LAB
ABO GROUP BLD: NORMAL
AMORPH URATE CRY URNS QL MICRO: ABNORMAL
BACTERIA UR QL AUTO: ABNORMAL /HPF
BASOPHILS # BLD AUTO: 0.02 THOUSANDS/ÂΜL (ref 0–0.1)
BASOPHILS NFR BLD AUTO: 0 % (ref 0–1)
BILIRUB UR QL STRIP: NEGATIVE
BLD GP AB SCN SERPL QL: NEGATIVE
CLARITY UR: ABNORMAL
COLOR UR: ABNORMAL
EOSINOPHIL # BLD AUTO: 0.11 THOUSAND/ÂΜL (ref 0–0.61)
EOSINOPHIL NFR BLD AUTO: 2 % (ref 0–6)
ERYTHROCYTE [DISTWIDTH] IN BLOOD BY AUTOMATED COUNT: 12.2 % (ref 11.6–15.1)
GLUCOSE UR STRIP-MCNC: NEGATIVE MG/DL
HBV SURFACE AG SER QL: NORMAL
HCT VFR BLD AUTO: 37.7 % (ref 34.8–46.1)
HCV AB SER QL: NORMAL
HGB BLD-MCNC: 13.4 G/DL (ref 11.5–15.4)
HGB UR QL STRIP.AUTO: ABNORMAL
HIV 1+2 AB+HIV1 P24 AG SERPL QL IA: NORMAL
HIV 2 AB SERPL QL IA: NORMAL
HIV1 AB SERPL QL IA: NORMAL
HIV1 P24 AG SERPL QL IA: NORMAL
IMM GRANULOCYTES # BLD AUTO: 0.02 THOUSAND/UL (ref 0–0.2)
IMM GRANULOCYTES NFR BLD AUTO: 0 % (ref 0–2)
KETONES UR STRIP-MCNC: NEGATIVE MG/DL
LEUKOCYTE ESTERASE UR QL STRIP: NEGATIVE
LYMPHOCYTES # BLD AUTO: 1.43 THOUSANDS/ÂΜL (ref 0.6–4.47)
LYMPHOCYTES NFR BLD AUTO: 21 % (ref 14–44)
MCH RBC QN AUTO: 32 PG (ref 26.8–34.3)
MCHC RBC AUTO-ENTMCNC: 35.5 G/DL (ref 31.4–37.4)
MCV RBC AUTO: 90 FL (ref 82–98)
MONOCYTES # BLD AUTO: 0.36 THOUSAND/ÂΜL (ref 0.17–1.22)
MONOCYTES NFR BLD AUTO: 5 % (ref 4–12)
MUCOUS THREADS UR QL AUTO: ABNORMAL
NEUTROPHILS # BLD AUTO: 4.79 THOUSANDS/ÂΜL (ref 1.85–7.62)
NEUTS SEG NFR BLD AUTO: 72 % (ref 43–75)
NITRITE UR QL STRIP: NEGATIVE
NON-SQ EPI CELLS URNS QL MICRO: ABNORMAL /HPF
NRBC BLD AUTO-RTO: 0 /100 WBCS
PH UR STRIP.AUTO: 7.5 [PH]
PLATELET # BLD AUTO: 257 THOUSANDS/UL (ref 149–390)
PMV BLD AUTO: 10.4 FL (ref 8.9–12.7)
PROT UR STRIP-MCNC: NEGATIVE MG/DL
RBC # BLD AUTO: 4.19 MILLION/UL (ref 3.81–5.12)
RBC #/AREA URNS AUTO: ABNORMAL /HPF
RH BLD: POSITIVE
RUBV IGG SERPL IA-ACNC: 35.1 IU/ML
SP GR UR STRIP.AUTO: 1.01 (ref 1–1.03)
SPECIMEN EXPIRATION DATE: NORMAL
TREPONEMA PALLIDUM IGG+IGM AB [PRESENCE] IN SERUM OR PLASMA BY IMMUNOASSAY: NORMAL
UROBILINOGEN UR STRIP-ACNC: <2 MG/DL
WBC # BLD AUTO: 6.73 THOUSAND/UL (ref 4.31–10.16)
WBC #/AREA URNS AUTO: ABNORMAL /HPF

## 2023-11-11 PROCEDURE — 87389 HIV-1 AG W/HIV-1&-2 AB AG IA: CPT

## 2023-11-11 PROCEDURE — 81001 URINALYSIS AUTO W/SCOPE: CPT

## 2023-11-11 PROCEDURE — 87086 URINE CULTURE/COLONY COUNT: CPT

## 2023-11-11 PROCEDURE — 86850 RBC ANTIBODY SCREEN: CPT

## 2023-11-11 PROCEDURE — 36415 COLL VENOUS BLD VENIPUNCTURE: CPT

## 2023-11-11 PROCEDURE — 86803 HEPATITIS C AB TEST: CPT

## 2023-11-11 PROCEDURE — 86900 BLOOD TYPING SEROLOGIC ABO: CPT

## 2023-11-11 PROCEDURE — 85025 COMPLETE CBC W/AUTO DIFF WBC: CPT

## 2023-11-11 PROCEDURE — 86762 RUBELLA ANTIBODY: CPT

## 2023-11-11 PROCEDURE — 86780 TREPONEMA PALLIDUM: CPT

## 2023-11-11 PROCEDURE — 86901 BLOOD TYPING SEROLOGIC RH(D): CPT

## 2023-11-11 PROCEDURE — 87340 HEPATITIS B SURFACE AG IA: CPT

## 2023-11-13 ENCOUNTER — ROUTINE PRENATAL (OUTPATIENT)
Dept: OBGYN CLINIC | Facility: CLINIC | Age: 28
End: 2023-11-13
Payer: COMMERCIAL

## 2023-11-13 VITALS — WEIGHT: 141 LBS | SYSTOLIC BLOOD PRESSURE: 98 MMHG | BODY MASS INDEX: 27.54 KG/M2 | DIASTOLIC BLOOD PRESSURE: 76 MMHG

## 2023-11-13 DIAGNOSIS — Z87.74 HISTORY OF CONGENITAL HEART DEFECT: ICD-10-CM

## 2023-11-13 DIAGNOSIS — Z36.9 ENCOUNTER FOR ANTENATAL SCREENING: ICD-10-CM

## 2023-11-13 DIAGNOSIS — Z33.1 INCIDENTAL PREGNANCY: ICD-10-CM

## 2023-11-13 DIAGNOSIS — Z3A.16 16 WEEKS GESTATION OF PREGNANCY: ICD-10-CM

## 2023-11-13 DIAGNOSIS — Z34.82 PRENATAL CARE, SUBSEQUENT PREGNANCY, SECOND TRIMESTER: Primary | ICD-10-CM

## 2023-11-13 DIAGNOSIS — Z98.891 STATUS POST PRIMARY LOW TRANSVERSE CESAREAN SECTION: ICD-10-CM

## 2023-11-13 LAB
BACTERIA UR CULT: NORMAL
SL AMB  POCT GLUCOSE, UA: NORMAL
SL AMB POCT URINE PROTEIN: NORMAL

## 2023-11-13 PROCEDURE — PNV: Performed by: PHYSICIAN ASSISTANT

## 2023-11-13 PROCEDURE — 81002 URINALYSIS NONAUTO W/O SCOPE: CPT | Performed by: PHYSICIAN ASSISTANT

## 2023-11-13 NOTE — PROGRESS NOTES
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 Normal echo in . F/u with cardiology scheduled 24  Fetal echo recommended at 22-24 weeks. Status post primary low transverse  section     Desires TOLAC. Level II US scheduled          16 weeks gestation of pregnancy     Felicia Bryan  is a 33 y.o. Gary Serrano @16w4d who presents for routine prenatal visit. C/o palpitations and mild SOB only when lying supine at night- resolves with sitting upright and/or rolling to left side. Normal exam. Reviewed common changes in pregnancy and discussed changes in sleep positioning. Encouraged adequate hydration. Has upcoming appt with cardiology and strict ER precautions for worsening/changing sx provided. No other OB complaints. NIPT - low risk   AFP - order provided   Level II - scheduled. Flu vaccine - declined      + flutters. Denies contractions, cramping, leakage of fluid or vaginal bleeding. Reviewed PTL precautions and reasons to call.             Other Visit Diagnoses       Prenatal care, subsequent pregnancy, second trimester    -  Primary    Relevant Orders    POCT urine dip (Completed)    Incidental pregnancy        Relevant Orders    Alpha fetoprotein, maternal    Encounter for  screening        Relevant Orders    Alpha fetoprotein, maternal

## 2023-11-13 NOTE — PROGRESS NOTES
Patient here for prenatal visit. She states she is having shortness of breath and heart palpitations the past 2 weeks. She also has nausea in the morning; denies spotting or cramping. She is not feeling movement yet. Order for AFP screen given today. Level II US scheduled for 12/12/23. Urine neg for protein and glucose. Flu vaccine offered; she declines.

## 2023-11-13 NOTE — ASSESSMENT & PLAN NOTE
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 Normal echo in 2021. F/u with cardiology scheduled 2/8/24  Fetal echo recommended at 22-24 weeks.

## 2023-11-13 NOTE — ASSESSMENT & PLAN NOTE
Raul So  is a 33 y.o.  @16w4d who presents for routine prenatal visit. C/o palpitations and mild SOB only when lying supine at night- resolves with sitting upright and/or rolling to left side. Normal exam. Reviewed common changes in pregnancy and discussed changes in sleep positioning. Encouraged adequate hydration. Has upcoming appt with cardiology and strict ER precautions for worsening/changing sx provided. No other OB complaints. NIPT - low risk   AFP - order provided   Level II - scheduled. Flu vaccine - declined      + flutters. Denies contractions, cramping, leakage of fluid or vaginal bleeding. Reviewed PTL precautions and reasons to call.

## 2023-12-07 ENCOUNTER — TELEPHONE (OUTPATIENT)
Dept: PERINATAL CARE | Facility: CLINIC | Age: 28
End: 2023-12-07

## 2023-12-10 PROBLEM — Z3A.20 20 WEEKS GESTATION OF PREGNANCY: Status: ACTIVE | Noted: 2023-10-18

## 2023-12-10 PROBLEM — Z34.90 SUPERVISION OF NORMAL PREGNANCY: Status: ACTIVE | Noted: 2023-12-10

## 2023-12-10 NOTE — ASSESSMENT & PLAN NOTE
Normal CBC 11/11/23; H/H 13.4/37.7  Will discontinue supplemental (extra) iron and taken daily PNV with iron.

## 2023-12-10 NOTE — ASSESSMENT & PLAN NOTE
Catherine Talbert is a 33 y.o. [facility] here for OB visit at 20w4d weeks. Arrived 6 minutes after appt start time. TWG  3.084 kg (6 lb 12.8 oz). No health concerns. Denies LOF, vaginal bleeding or contractions. AFP outstanding; encouraged to completed today. Aware of time constraints. Continue daily PNV   Influenza vaccine refused. Aware she may need antivirals if positive to prevent severe illness and possibly death  [facility] appt on 12/12/23  RTO in 4 weeks.

## 2023-12-11 ENCOUNTER — ROUTINE PRENATAL (OUTPATIENT)
Dept: OBGYN CLINIC | Facility: CLINIC | Age: 28
End: 2023-12-11
Payer: COMMERCIAL

## 2023-12-11 VITALS
BODY MASS INDEX: 28.03 KG/M2 | DIASTOLIC BLOOD PRESSURE: 56 MMHG | SYSTOLIC BLOOD PRESSURE: 100 MMHG | WEIGHT: 142.8 LBS | HEIGHT: 60 IN

## 2023-12-11 DIAGNOSIS — Z34.82 ENCOUNTER FOR SUPERVISION OF OTHER NORMAL PREGNANCY IN SECOND TRIMESTER: Primary | ICD-10-CM

## 2023-12-11 DIAGNOSIS — Z87.74 HISTORY OF CONGENITAL HEART DEFECT: ICD-10-CM

## 2023-12-11 DIAGNOSIS — O34.219 HISTORY OF CESAREAN DELIVERY, ANTEPARTUM: ICD-10-CM

## 2023-12-11 DIAGNOSIS — Z86.2 HISTORY OF ANEMIA: ICD-10-CM

## 2023-12-11 DIAGNOSIS — Z3A.20 20 WEEKS GESTATION OF PREGNANCY: ICD-10-CM

## 2023-12-11 PROBLEM — Z98.891 STATUS POST PRIMARY LOW TRANSVERSE CESAREAN SECTION: Status: RESOLVED | Noted: 2022-03-08 | Resolved: 2023-12-11

## 2023-12-11 LAB
SL AMB  POCT GLUCOSE, UA: NEGATIVE
SL AMB POCT URINE PROTEIN: NEGATIVE

## 2023-12-11 PROCEDURE — 81002 URINALYSIS NONAUTO W/O SCOPE: CPT | Performed by: NURSE PRACTITIONER

## 2023-12-11 PROCEDURE — PNV: Performed by: NURSE PRACTITIONER

## 2023-12-11 NOTE — PROGRESS NOTES
Problem   History of  Delivery, Antepartum   20 Weeks Gestation of Pregnancy    First OB labs - reminded to complete  Gc/chlamydia -complete  Pap - negative 2021  Blue folder - given    Genetic screening -   AFP -    28 week labs -  COVID vaccine -   TDAP -   Delivery consent -   Yellow folder -     Breast pump -   Pediatrician -   Perineal massage -   GBS -        History of Anemia   History of Congenital Heart Defect    PDA repaired in childhood. Echo  normal       20 weeks gestation of pregnancy  Hayder Dick is a 33 y.o. [facility] Mcburney here for OB visit at 20w4d weeks. Arrived 6 minutes after appt start time. TWG  3.084 kg (6 lb 12.8 oz). No health concerns. Denies LOF, vaginal bleeding or contractions. AFP outstanding; encouraged to completed today. Aware of time constraints. Continue daily PNV   Influenza vaccine refused. Aware she may need antivirals if positive to prevent severe illness and possibly death  MFM appt on 23  RTO in 4 weeks. History of congenital heart defect  Fetal echo around 22 weeks. Follow up with cardiology around 23. History of anemia  Normal CBC 23; H/H 13.4/37.7  Will discontinue supplemental (extra) iron and taken daily PNV with iron. History of  delivery, antepartum  TOLAC desired.

## 2023-12-13 ENCOUNTER — APPOINTMENT (OUTPATIENT)
Dept: LAB | Facility: HOSPITAL | Age: 28
End: 2023-12-13
Payer: COMMERCIAL

## 2023-12-13 DIAGNOSIS — Z36.9 ENCOUNTER FOR ANTENATAL SCREENING: ICD-10-CM

## 2023-12-13 DIAGNOSIS — Z33.1 INCIDENTAL PREGNANCY: ICD-10-CM

## 2023-12-13 PROBLEM — Z82.79 FAMILY HISTORY OF CONGENITAL OR GENETIC CONDITION: Status: ACTIVE | Noted: 2023-12-13

## 2023-12-13 PROBLEM — Z3A.21 21 WEEKS GESTATION OF PREGNANCY: Status: ACTIVE | Noted: 2023-10-18

## 2023-12-13 PROCEDURE — 36415 COLL VENOUS BLD VENIPUNCTURE: CPT

## 2023-12-13 PROCEDURE — 82105 ALPHA-FETOPROTEIN SERUM: CPT

## 2023-12-14 ENCOUNTER — ROUTINE PRENATAL (OUTPATIENT)
Dept: PERINATAL CARE | Facility: OTHER | Age: 28
End: 2023-12-14
Payer: COMMERCIAL

## 2023-12-14 VITALS
BODY MASS INDEX: 27.96 KG/M2 | WEIGHT: 142.4 LBS | HEIGHT: 60 IN | SYSTOLIC BLOOD PRESSURE: 90 MMHG | DIASTOLIC BLOOD PRESSURE: 60 MMHG | HEART RATE: 101 BPM

## 2023-12-14 DIAGNOSIS — Z36.86 ENCOUNTER FOR ANTENATAL SCREENING FOR CERVICAL LENGTH: ICD-10-CM

## 2023-12-14 DIAGNOSIS — Z36.3 ENCOUNTER FOR ANTENATAL SCREENING FOR MALFORMATIONS: ICD-10-CM

## 2023-12-14 DIAGNOSIS — Z87.74 HISTORY OF CONGENITAL HEART DEFECT: ICD-10-CM

## 2023-12-14 DIAGNOSIS — O34.219 HISTORY OF CESAREAN DELIVERY, ANTEPARTUM: Primary | ICD-10-CM

## 2023-12-14 DIAGNOSIS — Z3A.21 21 WEEKS GESTATION OF PREGNANCY: ICD-10-CM

## 2023-12-14 PROCEDURE — 99213 OFFICE O/P EST LOW 20 MIN: CPT | Performed by: OBSTETRICS & GYNECOLOGY

## 2023-12-14 PROCEDURE — 76817 TRANSVAGINAL US OBSTETRIC: CPT | Performed by: OBSTETRICS & GYNECOLOGY

## 2023-12-14 PROCEDURE — 76805 OB US >/= 14 WKS SNGL FETUS: CPT | Performed by: OBSTETRICS & GYNECOLOGY

## 2023-12-14 NOTE — PROGRESS NOTES
[address]: Courtney Mayfield was seen today at 21w0d for anatomic survey and cervical length screening ultrasound. See ultrasound report under "OB Procedures" tab.   Please don't hesitate to contact our office with any concerns or questions.  -Rosanna Leo MD

## 2023-12-14 NOTE — LETTER
December 14, 2023     Rahel Liriano PA-C  [address] [place]    Patient: Le Dent   YOB: 1995   Date of Visit: 12/14/2023       Dear Emily Gooden: Thank you for referring Le Dent to me for evaluation. Below are my notes for this consultation. If you have questions, please do not hesitate to call me. I look forward to following your patient along with you. Sincerely,        Jaxon Balderas MD        CC: No Recipients    Jaxon Balderas MD  12/14/2023  8:41 AM  Sign when Signing Visit  [address]: Le Dent was seen today at 21w0d for anatomic survey and cervical length screening ultrasound. See ultrasound report under "OB Procedures" tab.   Please don't hesitate to contact our office with any concerns or questions.  -Jaxon Balderas MD

## 2023-12-15 LAB
2ND TRIMESTER 4 SCREEN SERPL-IMP: NORMAL
AFP ADJ MOM SERPL: 1.4
AFP INTERP AMN-IMP: NORMAL
AFP INTERP SERPL-IMP: NORMAL
AFP INTERP SERPL-IMP: NORMAL
AFP SERPL-MCNC: 94 NG/ML
AGE AT DELIVERY: 30 YR
GA METHOD: NORMAL
GA: 20.9 WEEKS
IDDM PATIENT QL: NO
MULTIPLE PREGNANCY: NO
NEURAL TUBE DEFECT RISK FETUS: 3600 %

## 2023-12-29 ENCOUNTER — ROUTINE PRENATAL (OUTPATIENT)
Dept: PERINATAL CARE | Facility: OTHER | Age: 28
End: 2023-12-29
Payer: COMMERCIAL

## 2023-12-29 VITALS
SYSTOLIC BLOOD PRESSURE: 90 MMHG | BODY MASS INDEX: 27.8 KG/M2 | HEART RATE: 98 BPM | WEIGHT: 141.6 LBS | DIASTOLIC BLOOD PRESSURE: 50 MMHG | HEIGHT: 60 IN

## 2023-12-29 DIAGNOSIS — Z82.79 FAMILY HISTORY OF CONGENITAL OR GENETIC CONDITION: Primary | ICD-10-CM

## 2023-12-29 DIAGNOSIS — Z87.74 HISTORY OF CONGENITAL HEART DEFECT: ICD-10-CM

## 2023-12-29 DIAGNOSIS — Z3A.23 23 WEEKS GESTATION OF PREGNANCY: ICD-10-CM

## 2023-12-29 DIAGNOSIS — O34.219 HISTORY OF CESAREAN DELIVERY, ANTEPARTUM: ICD-10-CM

## 2023-12-29 PROCEDURE — 93325 DOPPLER ECHO COLOR FLOW MAPG: CPT | Performed by: OBSTETRICS & GYNECOLOGY

## 2023-12-29 PROCEDURE — 99213 OFFICE O/P EST LOW 20 MIN: CPT | Performed by: OBSTETRICS & GYNECOLOGY

## 2023-12-29 PROCEDURE — 76825 ECHO EXAM OF FETAL HEART: CPT | Performed by: OBSTETRICS & GYNECOLOGY

## 2023-12-29 PROCEDURE — 76827 ECHO EXAM OF FETAL HEART: CPT | Performed by: OBSTETRICS & GYNECOLOGY

## 2023-12-29 NOTE — LETTER
2023     Una Grissom DO  [address] [id number]    Patient: Nessa Calabrese   YOB: 1995   Date of Visit: 2023       Dear Dr. Grissom:    Thank you for referring Nessa Calabrese to me for evaluation. Below are my notes for this consultation.    If you have questions, please do not hesitate to call me. I look forward to following your patient along with you.         Sincerely,        Kathi Aponte MD        CC: No Recipients    Kathi Aponte MD  2023  9:14 PM  Sign when Signing Visit  Nessa Calabrese  has no complaints today at 23w1d. She reports fetal movements and does not report any vaginal bleeding or signs of labor.  Her recently completed fetal testing revealed a normal MaterniT Genome and MSAFP. She is here today for an ultrasound for fetal echo.    Problem list:  History of a prior  section  History of a prior child with PAX6/WAGR syndrome.  The deletion was determined to be Denovo and therefore recurrence risk for this pregnancy is estimated at less than 1%.  Mother has a history of a PDA repaired in childhood.  Echo  was normal.    Ultrasound findings:  The ultrasound today shows a normal fetal echo.  I stressed that today's echo is unable to detect whether or not this child's ductus arteriosus or fossa ovale will close normally as they are expected to be open in utero.     Pregnancy ultrasound has limitations and is unable to detect all forms of fetal congenital abnormalities.      Follow up recommended:   Recommend a follow-up ultrasound for growth in 13 weeks as she is considering a .    Pre visit time reviewing her records   5 minutes  Face to face time 5 minutes  Post visit time on documentation of note, updating her problem list, adding orders and prescriptions 5 minutes.  Procedures that were completed today were charged separately.   The level of decision making was straight forward.    Kathi Aponte MD

## 2023-12-30 NOTE — PROGRESS NOTES
Nessa Calabrese  has no complaints today at 23w1d. She reports fetal movements and does not report any vaginal bleeding or signs of labor.  Her recently completed fetal testing revealed a normal MaterniT Genome and MSAFP. She is here today for an ultrasound for fetal echo.    Problem list:  History of a prior  section  History of a prior child with PAX6/WAGR syndrome.  The deletion was determined to be Denovo and therefore recurrence risk for this pregnancy is estimated at less than 1%.  Mother has a history of a PDA repaired in childhood.  Echo  was normal.    Ultrasound findings:  The ultrasound today shows a normal fetal echo.  I stressed that today's echo is unable to detect whether or not this child's ductus arteriosus or fossa ovale will close normally as they are expected to be open in utero.     Pregnancy ultrasound has limitations and is unable to detect all forms of fetal congenital abnormalities.      Follow up recommended:   Recommend a follow-up ultrasound for growth in 13 weeks as she is considering a .    Pre visit time reviewing her records   5 minutes  Face to face time 5 minutes  Post visit time on documentation of note, updating her problem list, adding orders and prescriptions 5 minutes.  Procedures that were completed today were charged separately.   The level of decision making was straight forward.    Kathi Aponte MD

## 2024-01-08 PROBLEM — Z3A.24 24 WEEKS GESTATION OF PREGNANCY: Status: ACTIVE | Noted: 2023-10-18

## 2024-01-11 ENCOUNTER — TELEPHONE (OUTPATIENT)
Dept: OBGYN CLINIC | Facility: CLINIC | Age: 29
End: 2024-01-11

## 2024-01-11 PROBLEM — Z3A.25 25 WEEKS GESTATION OF PREGNANCY: Status: ACTIVE | Noted: 2023-10-18

## 2024-01-11 NOTE — TELEPHONE ENCOUNTER
Nausea, abdominal pain, upper belly pressure.     Morning reflux, taking b6/unisom for nausea. Tums PRN been feeling nausea today along with the RUQ pain/ pressure- denies HA, vision changes, BP unchecked will ask school nurse to check for her.     Denies vaginal bleeding, loss of fluid, contractions, baby hasn't been moving too much- she is going to sit and to a [facility] to make sure baby is doing alright, will call if she does not feel 10 kicks in 2 hours. She is also going to ask the nurse to take a BP will call if over 140/90.     Will reach out to on call provider for further advisement about RUQ pain/pressure

## 2024-01-11 NOTE — TELEPHONE ENCOUNTER
Spoke with patient-EC recommendations to continue to monitor call back with dfm or high bp rest and hydrate, patient verbalized understanding all questions answered.

## 2024-01-11 NOTE — TELEPHONE ENCOUNTER
Ms. Calabrese missed her OB appointment this morning.  She is a  and had an incident with one of her children and couldn't make it to the appointment.  Ms. Calabrese is calling because she is currently experiencing a lot of pressure in her upper belly and is concerned.  Please call to discuss.

## 2024-01-12 ENCOUNTER — ROUTINE PRENATAL (OUTPATIENT)
Dept: OBGYN CLINIC | Facility: MEDICAL CENTER | Age: 29
End: 2024-01-12
Payer: COMMERCIAL

## 2024-01-12 VITALS
SYSTOLIC BLOOD PRESSURE: 100 MMHG | HEIGHT: 61 IN | BODY MASS INDEX: 27.64 KG/M2 | DIASTOLIC BLOOD PRESSURE: 64 MMHG | WEIGHT: 146.4 LBS

## 2024-01-12 DIAGNOSIS — O34.219 HISTORY OF CESAREAN DELIVERY, ANTEPARTUM: ICD-10-CM

## 2024-01-12 DIAGNOSIS — Z34.82 ENCOUNTER FOR SUPERVISION OF OTHER NORMAL PREGNANCY IN SECOND TRIMESTER: Primary | ICD-10-CM

## 2024-01-12 DIAGNOSIS — Z3A.25 25 WEEKS GESTATION OF PREGNANCY: ICD-10-CM

## 2024-01-12 LAB
SL AMB  POCT GLUCOSE, UA: NEGATIVE
SL AMB POCT URINE PROTEIN: ABNORMAL

## 2024-01-12 PROCEDURE — PNV: Performed by: NURSE PRACTITIONER

## 2024-01-12 PROCEDURE — 81002 URINALYSIS NONAUTO W/O SCOPE: CPT | Performed by: NURSE PRACTITIONER

## 2024-01-12 NOTE — ASSESSMENT & PLAN NOTE
Nessa had genetic counseling on 10/19 for her son's PAX6 gene deletion/WAGR syndrome.  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     NIPT low risk

## 2024-01-12 NOTE — TELEPHONE ENCOUNTER
Spoke with patient- she was nervous about her kick counts. She thought she needed 20 kicks in 2 hours.. advised patient she only needed 10 and she has proper fetal movement which eased her mind.     RUQ pain resolved and she is feeling better.    Will send [facility] instructions to patient via mychart for reassurance.      All questions answered and patient verbalized understanding

## 2024-01-12 NOTE — ASSESSMENT & PLAN NOTE
Nessa Calabrese is a 28 y.o.  here for OB visit at 25w1d weeks. TWG 4.717 kg (10 lb 6.4 oz).  No health concerns.     Denies LOF, vaginal bleeding or contractions.   28 week labs ordered  Influenza vaccine refused and aware of recommendations  Covid-refused.  MFM appt 3/29/24  Pediatric CPR class recommended  RTO 4 weeks

## 2024-01-12 NOTE — PROGRESS NOTES
Problem   Family History of Congenital Or Genetic Condition    Nessa had genetic counseling on 10/19 for her son's PAX6 gene deletion/WAGR syndrome.  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    NIPT low risk      History of  Delivery, Antepartum   25 Weeks Gestation of Pregnancy    First OB labs - reminded to complete  Gc/chlamydia -complete  Pap - negative 2021  Blue folder - given    Genetic screening - low risk  AFP - neg   Influenza-refused  28 week labs -ordered 24  COVID vaccine -   TDAP -   Delivery consent -   Yellow folder -     Breast pump -   Pediatrician -   Perineal massage -   GBS -        History of Anemia     25 weeks gestation of pregnancy  Nessa Calabrese is a 28 y.o.  here for OB visit at 25w1d weeks. TWG 4.717 kg (10 lb 6.4 oz).  No health concerns.     Denies LOF, vaginal bleeding or contractions.   28 week labs ordered  Influenza vaccine refused and aware of recommendations  Covid-refused.  MFM appt 3/29/24  Pediatric CPR class recommended  RTO 4 weeks    History of  delivery, antepartum  Desires TOLAC    Family history of congenital or genetic condition  Nessa had genetic counseling on 10/19 for her son's PAX6 gene deletion/WAGR syndrome.  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     NIPT low risk     History of anemia  Compliant with PNV   CBC ordered with 28 week labs

## 2024-02-02 ENCOUNTER — HOSPITAL ENCOUNTER (OUTPATIENT)
Facility: HOSPITAL | Age: 29
Discharge: HOME | End: 2024-02-02
Attending: OBSTETRICS & GYNECOLOGY | Admitting: OBSTETRICS & GYNECOLOGY
Payer: COMMERCIAL

## 2024-02-02 ENCOUNTER — NURSE TRIAGE (OUTPATIENT)
Dept: OTHER | Facility: OTHER | Age: 29
End: 2024-02-02

## 2024-02-02 VITALS
HEART RATE: 108 BPM | BODY MASS INDEX: 28.66 KG/M2 | WEIGHT: 146 LBS | OXYGEN SATURATION: 97 % | RESPIRATION RATE: 18 BRPM | DIASTOLIC BLOOD PRESSURE: 52 MMHG | TEMPERATURE: 98.5 F | HEIGHT: 60 IN | SYSTOLIC BLOOD PRESSURE: 103 MMHG
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
## 2024-02-02 PROCEDURE — NC001 PR NO CHARGE: Performed by: OBSTETRICS & GYNECOLOGY

## 2024-02-02 PROCEDURE — 99214 OFFICE O/P EST MOD 30 MIN: CPT

## 2024-02-02 PROCEDURE — G0463 HOSPITAL OUTPT CLINIC VISIT: HCPCS
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
## 2024-02-05 ENCOUNTER — ROUTINE PRENATAL (OUTPATIENT)
Dept: OBGYN CLINIC | Facility: CLINIC | Age: 29
End: 2024-02-05

## 2024-02-05 VITALS — SYSTOLIC BLOOD PRESSURE: 110 MMHG | WEIGHT: 147.8 LBS | BODY MASS INDEX: 28.87 KG/M2 | DIASTOLIC BLOOD PRESSURE: 66 MMHG

## 2024-02-05 DIAGNOSIS — Z3A.28 28 WEEKS GESTATION OF PREGNANCY: Primary | ICD-10-CM

## 2024-02-05 DIAGNOSIS — O34.219 HISTORY OF CESAREAN DELIVERY, ANTEPARTUM: ICD-10-CM

## 2024-02-05 PROCEDURE — PNV: Performed by: STUDENT IN AN ORGANIZED HEALTH CARE EDUCATION/TRAINING PROGRAM

## 2024-02-05 NOTE — ASSESSMENT & PLAN NOTE
-precautions reviewed  -due to time constrictions, 3T folder given to Nessa today, will need to be signed  -prepregnancy BMI 26 with goal weight gain 15-25#: TWG = 11#

## 2024-02-05 NOTE — PROGRESS NOTES
Pt is here for routine ob visit   No concerns at this time  Unable to void   No LOF,VB,Contractions   +FM   28wk labs not completed   Declined flu vaccine   Tdap next visit   Yellow folder given to review and bring back   Breast Pump ordered   Peds Referral not needed

## 2024-02-05 NOTE — PROGRESS NOTES
28 y.o.  at 28w4d, here for routine OB visit. Feeling well overall and without concerns. Good FM. Denies LOF, VB, contractions.      Problem List Items Addressed This Visit          Other    28 weeks gestation of pregnancy - Primary     -precautions reviewed  -due to time constrictions, 3T folder given to Nessa today, will need to be signed  -prepregnancy BMI 26 with goal weight gain 15-25#: TWG = 11#         History of  delivery, antepartum     Desires TOLAC

## 2024-02-10 ENCOUNTER — APPOINTMENT (OUTPATIENT)
Dept: LAB | Facility: HOSPITAL | Age: 29
End: 2024-02-10
Payer: COMMERCIAL

## 2024-02-10 DIAGNOSIS — Z34.82 ENCOUNTER FOR SUPERVISION OF OTHER NORMAL PREGNANCY IN SECOND TRIMESTER: ICD-10-CM

## 2024-02-10 LAB
BASOPHILS # BLD AUTO: 0.02 THOUSANDS/ÂΜL (ref 0–0.1)
BASOPHILS NFR BLD AUTO: 0 % (ref 0–1)
EOSINOPHIL # BLD AUTO: 0.09 THOUSAND/ÂΜL (ref 0–0.61)
EOSINOPHIL NFR BLD AUTO: 1 % (ref 0–6)
ERYTHROCYTE [DISTWIDTH] IN BLOOD BY AUTOMATED COUNT: 12.7 % (ref 11.6–15.1)
GLUCOSE 1H P 50 G GLC PO SERPL-MCNC: 113 MG/DL (ref 40–134)
HCT VFR BLD AUTO: 31.2 % (ref 34.8–46.1)
HGB BLD-MCNC: 10.6 G/DL (ref 11.5–15.4)
IMM GRANULOCYTES # BLD AUTO: 0.05 THOUSAND/UL (ref 0–0.2)
IMM GRANULOCYTES NFR BLD AUTO: 1 % (ref 0–2)
LYMPHOCYTES # BLD AUTO: 1.19 THOUSANDS/ÂΜL (ref 0.6–4.47)
LYMPHOCYTES NFR BLD AUTO: 15 % (ref 14–44)
MCH RBC QN AUTO: 30.5 PG (ref 26.8–34.3)
MCHC RBC AUTO-ENTMCNC: 34 G/DL (ref 31.4–37.4)
MCV RBC AUTO: 90 FL (ref 82–98)
MONOCYTES # BLD AUTO: 0.44 THOUSAND/ÂΜL (ref 0.17–1.22)
MONOCYTES NFR BLD AUTO: 6 % (ref 4–12)
NEUTROPHILS # BLD AUTO: 5.99 THOUSANDS/ÂΜL (ref 1.85–7.62)
NEUTS SEG NFR BLD AUTO: 77 % (ref 43–75)
NRBC BLD AUTO-RTO: 0 /100 WBCS
PLATELET # BLD AUTO: 254 THOUSANDS/UL (ref 149–390)
PMV BLD AUTO: 10.3 FL (ref 8.9–12.7)
RBC # BLD AUTO: 3.47 MILLION/UL (ref 3.81–5.12)
WBC # BLD AUTO: 7.78 THOUSAND/UL (ref 4.31–10.16)

## 2024-02-10 PROCEDURE — 85025 COMPLETE CBC W/AUTO DIFF WBC: CPT

## 2024-02-10 PROCEDURE — 86780 TREPONEMA PALLIDUM: CPT

## 2024-02-10 PROCEDURE — 36415 COLL VENOUS BLD VENIPUNCTURE: CPT

## 2024-02-10 PROCEDURE — 82950 GLUCOSE TEST: CPT

## 2024-02-11 LAB — TREPONEMA PALLIDUM IGG+IGM AB [PRESENCE] IN SERUM OR PLASMA BY IMMUNOASSAY: NORMAL

## 2024-02-19 ENCOUNTER — TELEPHONE (OUTPATIENT)
Dept: OBGYN CLINIC | Facility: CLINIC | Age: 29
End: 2024-02-19

## 2024-02-19 NOTE — TELEPHONE ENCOUNTER
Breast pump ordered through [employer] per Patient request,  RX faxed on 2/19/2024.    [facility] Pregnancy And Lactation Text: There is insufficient data to evaluate drug-associated risk for major birth defects, miscarriage, or other adverse maternal or fetal outcomes.  There is a pregnancy registry that monitors pregnancy outcomes in pregnant persons exposed to the medication during pregnancy.  It is unknown if this medication is excreted in breast milk.  Do not breastfeed during treatment and for about 4 weeks after the last dose.

## 2024-02-20 ENCOUNTER — TELEPHONE (OUTPATIENT)
Dept: OBGYN CLINIC | Facility: CLINIC | Age: 29
End: 2024-02-20

## 2024-02-20 NOTE — TELEPHONE ENCOUNTER
Called patient and left a voicemail per communication consent on file checking in to be sure everything was ok as she missed her appointment today 2/20 at 3:30p. Asked her to call back or reschedule through [facility].
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
## 2024-02-22 ENCOUNTER — TELEPHONE (OUTPATIENT)
Dept: OBGYN CLINIC | Facility: CLINIC | Age: 29
End: 2024-02-22

## 2024-02-26 ENCOUNTER — TELEPHONE (OUTPATIENT)
Age: 29
End: 2024-02-26

## 2024-02-26 NOTE — TELEPHONE ENCOUNTER
Pt called to schedule. Pt missed the 30wk visit, her next visit is 32wk on 03/07. I could not find any availability close to 02/20. Pt is only available after 3:15 due to her work. Contacted practice for scheduling.

## 2024-02-28 ENCOUNTER — ROUTINE PRENATAL (OUTPATIENT)
Dept: OBGYN CLINIC | Facility: MEDICAL CENTER | Age: 29
End: 2024-02-28
Payer: COMMERCIAL

## 2024-02-28 VITALS
SYSTOLIC BLOOD PRESSURE: 100 MMHG | HEART RATE: 104 BPM | HEIGHT: 60 IN | WEIGHT: 151 LBS | BODY MASS INDEX: 29.64 KG/M2 | DIASTOLIC BLOOD PRESSURE: 68 MMHG

## 2024-02-28 DIAGNOSIS — Z3A.31 31 WEEKS GESTATION OF PREGNANCY: Primary | ICD-10-CM

## 2024-02-28 DIAGNOSIS — Z34.83 PRENATAL CARE, SUBSEQUENT PREGNANCY, THIRD TRIMESTER: ICD-10-CM

## 2024-02-28 DIAGNOSIS — Z82.79 FAMILY HISTORY OF CONGENITAL OR GENETIC CONDITION: ICD-10-CM

## 2024-02-28 DIAGNOSIS — O99.019 MATERNAL ANEMIA IN PREGNANCY, ANTEPARTUM: ICD-10-CM

## 2024-02-28 DIAGNOSIS — O34.219 HISTORY OF CESAREAN DELIVERY, ANTEPARTUM: ICD-10-CM

## 2024-02-28 LAB
SL AMB  POCT GLUCOSE, UA: NORMAL
SL AMB POCT URINE PROTEIN: NORMAL

## 2024-02-28 PROCEDURE — 81002 URINALYSIS NONAUTO W/O SCOPE: CPT | Performed by: CLINICAL NURSE SPECIALIST

## 2024-02-28 PROCEDURE — PNV: Performed by: CLINICAL NURSE SPECIALIST

## 2024-02-28 RX ORDER — PNV NO.95/FERROUS FUM/FOLIC AC 28MG-0.8MG
TABLET ORAL
COMMUNITY

## 2024-02-28 NOTE — ASSESSMENT & PLAN NOTE
Did start on oral iron replacement  Advised repeat BW after being on for about a month-orders placed

## 2024-02-28 NOTE — ASSESSMENT & PLAN NOTE
, 31w6d  No major complaints today.   Reports good FM  The patient was counseled about the labor process. She was counseled regarding potential reasons that she may need a  section including arrest of dilation/descent, non-reassuring fetal status, or worsening maternal status.      She was counseled on the risks of  including bleeding, infection, and injury to surrounding structures including the bowel and bladder. She was counseled that there are medical and surgical methods to manage excessive postpartum bleeding. She was counseled that in the event of excessive blood loss, she may require blood transfusion which includes a small risk of blood borne diseases such as hepatitis and HIV. The patient is OK with receiving a blood transfusion if necessary.  The patient had an opportunity to ask questions and signed consent.      She was counseled about the possible need for operative delivery using the vacuum or forceps and the indications for doing so. She was counseled that there is a small risk of  complications including intracranial hemorrhage, lacerations, nerve damage or fracture as well as the increased risk for more severe maternal laceration. The patient signed consent.     Pt returned delivery paperwork including birth plan. Reviewed with pt. No out of the ordinary requests. Discussed limitations regarding intermittent monitoring and will be assessed at time of labor.    Reviewed the benefits of skin-to-skin immediately after the baby is born  good for bonding  helps initiating breastfeeding if desired  keeps the baby warm  calms both mom and baby  helps to maintain the baby's blood sugar and helps to regulate heart rate and respirations.   Pt has peds chosen-  [facility].     We again reviewed the S/S PTL and importance of consistent/regular FM. Reviewed process for [facility] and encouraged pt to call with any decreases in fetal movement

## 2024-02-28 NOTE — ASSESSMENT & PLAN NOTE
Reviewed TOLAC vs Repeat C/S    Reviewed increased risk of uterine rupture (approx 1%) with TOLAC which could impact maternal and  outcomes including infection, excess bleeding, hysterectomy, and maternal or fetal death.     Successful  is associated with lower morbidity than repeat C/S and Recovery is shorter, while Rpt C/S is  associated with lower rates of maternal morbidity compared to  Unscussful     Overall success rate of  is 60-80% but is largely dependent upon the circumstances surrounding the time of delivery

## 2024-02-28 NOTE — PROGRESS NOTES
Nessa is a 28 y.o.  31w6d here for Routine Prenatal Visit (MELODIE /Blood type O+/Labs UTD/Del consent signed /Breast pump ordered/Peds not needed/Tdap declined /Rhogam not needed /Flu declined/RSV/Urine -/-/GBS //Denies LOF, VB, or CTX./Pt has +FM/Questions or conerns- /)    Prenatal Visit  Subjective:   Denies unusual vaginal discharge, LOF, VB, or ctx. Reports Fetus is active.     Objective:  Vitals:    24 1545   BP: 100/68   Pulse: 104     Pregravid Weight/BMI: 61.7 kg (136 lb) (BMI 26.56)  Current Weight: 68.5 kg (151 lb)   Total Weight Gain: 6.804 kg (15 lb)     OBGyn Exam  Physical Exam  Fetal Heart Rate: 145 , Fundal Height (cm): 30 cm    General: Not in acute distress and appearing well nourished and well groomed  Genitourinary:          Pelvic exam exam deferred   Cardiovascular:   Rate and Rhythm: Normal rate.   Pulmonary:    Effort: normal, not labored  Abdominal:  Abdomen is soft and gravid    Musculoskeletal: Active movement of all extremities, no gross limitations of ROM     Edema: None  Neurological:   Mental Status: She is alert and oriented to person, place, and time.   Skin: General: Skin is warm and dry.   Psychiatric:    Mood and Affect: Mood normal.      Behavior: Behavior normal  Assessment & Plan:  1. 31 weeks gestation of pregnancy  Assessment & Plan:  , 31w6d  No major complaints today.   Reports good FM  The patient was counseled about the labor process. She was counseled regarding potential reasons that she may need a  section including arrest of dilation/descent, non-reassuring fetal status, or worsening maternal status.      She was counseled on the risks of  including bleeding, infection, and injury to surrounding structures including the bowel and bladder. She was counseled that there are medical and surgical methods to manage excessive postpartum bleeding. She was counseled that in the event of excessive blood loss, she may require blood  transfusion which includes a small risk of blood borne diseases such as hepatitis and HIV. The patient is OK with receiving a blood transfusion if necessary.  The patient had an opportunity to ask questions and signed consent.      She was counseled about the possible need for operative delivery using the vacuum or forceps and the indications for doing so. She was counseled that there is a small risk of  complications including intracranial hemorrhage, lacerations, nerve damage or fracture as well as the increased risk for more severe maternal laceration. The patient signed consent.     Pt returned delivery paperwork including birth plan. Reviewed with pt. No out of the ordinary requests. Discussed limitations regarding intermittent monitoring and will be assessed at time of labor.    Reviewed the benefits of skin-to-skin immediately after the baby is born  good for bonding  helps initiating breastfeeding if desired  keeps the baby warm  calms both mom and baby  helps to maintain the baby's blood sugar and helps to regulate heart rate and respirations.   Pt has peds chosen-   [facility].     We again reviewed the S/S PTL and importance of consistent/regular FM. Reviewed process for FKC and encouraged pt to call with any decreases in fetal movement      2. History of  delivery, antepartum  Assessment & Plan:  Reviewed TOLAC vs Repeat C/S    Reviewed increased risk of uterine rupture (approx 1%) with TOLAC which could impact maternal and  outcomes including infection, excess bleeding, hysterectomy, and maternal or fetal death.     Successful  is associated with lower morbidity than repeat C/S and Recovery is shorter, while Rpt C/S is  associated with lower rates of maternal morbidity compared to  Unscussful     Overall success rate of  is 60-80% but is largely dependent upon the circumstances surrounding the time of delivery      3. Family history of congenital or genetic  condition    4. Maternal anemia in pregnancy, antepartum  Assessment & Plan:  Did start on oral iron replacement  Advised repeat BW after being on for about a month-orders placed    Orders:  -     CBC; Future  -     Ferritin; Future    5. Prenatal care, subsequent pregnancy, third trimester  -     POCT urine dip         HOPE Bradshaw  2/28/2024

## 2024-03-11 ENCOUNTER — ROUTINE PRENATAL (OUTPATIENT)
Dept: OBGYN CLINIC | Facility: CLINIC | Age: 29
End: 2024-03-11

## 2024-03-11 VITALS — DIASTOLIC BLOOD PRESSURE: 62 MMHG | WEIGHT: 154 LBS | BODY MASS INDEX: 30.08 KG/M2 | SYSTOLIC BLOOD PRESSURE: 100 MMHG

## 2024-03-11 DIAGNOSIS — Z34.83 ENCOUNTER FOR SUPERVISION OF OTHER NORMAL PREGNANCY, THIRD TRIMESTER: Primary | ICD-10-CM

## 2024-03-11 DIAGNOSIS — Z3A.33 33 WEEKS GESTATION OF PREGNANCY: ICD-10-CM

## 2024-03-11 DIAGNOSIS — O34.219 HISTORY OF CESAREAN DELIVERY, ANTEPARTUM: ICD-10-CM

## 2024-03-11 PROCEDURE — PNV: Performed by: STUDENT IN AN ORGANIZED HEALTH CARE EDUCATION/TRAINING PROGRAM

## 2024-03-11 NOTE — ASSESSMENT & PLAN NOTE
-precautions reviewed  -delivery consent previously signed, birth plan returned  -prepregnancy BMI 27 with goal weight gain 15-25#: TWG = 18#

## 2024-03-11 NOTE — PROGRESS NOTES
28 y.o.  at 33w4d, here for routine OB visit. Feeling well overall, though somewhat congested following URI.     Good FM. Denies LOF, VB, contractions.      Problem List Items Addressed This Visit          Other    History of  delivery, antepartum    33 weeks gestation of pregnancy     -precautions reviewed  -delivery consent previously signed, birth plan returned  -prepregnancy BMI 27 with goal weight gain 15-25#: TWG = 18#             Other Visit Diagnoses       Encounter for supervision of other normal pregnancy, third trimester    -  Primary

## 2024-03-11 NOTE — PROGRESS NOTES
Pt is here for routine ob visit   No concerns at this time  Unable to void  No LOF,VB,Contractions  +FM   Declined all vaccines   Delivery consent signed at previous visit

## 2024-03-20 ENCOUNTER — ROUTINE PRENATAL (OUTPATIENT)
Dept: OBGYN CLINIC | Facility: CLINIC | Age: 29
End: 2024-03-20
Payer: COMMERCIAL

## 2024-03-20 VITALS
SYSTOLIC BLOOD PRESSURE: 114 MMHG | HEART RATE: 107 BPM | WEIGHT: 158.2 LBS | OXYGEN SATURATION: 98 % | DIASTOLIC BLOOD PRESSURE: 70 MMHG | BODY MASS INDEX: 30.9 KG/M2

## 2024-03-20 DIAGNOSIS — Z34.83 ENCOUNTER FOR SUPERVISION OF OTHER NORMAL PREGNANCY, THIRD TRIMESTER: Primary | ICD-10-CM

## 2024-03-20 DIAGNOSIS — Z3A.34 34 WEEKS GESTATION OF PREGNANCY: ICD-10-CM

## 2024-03-20 DIAGNOSIS — O34.219 HISTORY OF CESAREAN DELIVERY, ANTEPARTUM: ICD-10-CM

## 2024-03-20 DIAGNOSIS — O99.019 MATERNAL ANEMIA IN PREGNANCY, ANTEPARTUM: ICD-10-CM

## 2024-03-20 LAB
SL AMB  POCT GLUCOSE, UA: NEGATIVE
SL AMB POCT URINE PROTEIN: NEGATIVE

## 2024-03-20 PROCEDURE — PNV: Performed by: OBSTETRICS & GYNECOLOGY

## 2024-03-20 PROCEDURE — 81002 URINALYSIS NONAUTO W/O SCOPE: CPT | Performed by: OBSTETRICS & GYNECOLOGY

## 2024-03-20 NOTE — ASSESSMENT & PLAN NOTE
Declined all vaccines.  Breast pump ordered.  Peds referral not needed as had pediatrician.  Kick counts and  labor precautions reviewed.

## 2024-03-20 NOTE — PROGRESS NOTES
Prenatal visit @ 34w6d.  Denies ctxs, VB, LOF.  Good FM.  Feels well overall.      Problem List Items Addressed This Visit       34 weeks gestation of pregnancy     Declined all vaccines.  Breast pump ordered.  Peds referral not needed as had pediatrician.  Kick counts and  labor precautions reviewed.          History of  delivery, antepartum     Plan for TOLAC.  Reviewed risks/benefits with patient again.    Has growth scan scheduled 3/29/2024.   Will need to discuss induction if/as needed closer to term.          Maternal anemia in pregnancy, antepartum     Taking oral iron.  Advised repeat CBC this or next week to ensure appropriate rise and allow time for IV iron infusions if not.   Patient states understanding.           Other Visit Diagnoses       Encounter for supervision of other normal pregnancy, third trimester    -  Primary    Relevant Orders    POCT urine dip (Completed)

## 2024-03-20 NOTE — ASSESSMENT & PLAN NOTE
Plan for TOLAC.  Reviewed risks/benefits with patient again.    Has growth scan scheduled 3/29/2024.   Will need to discuss induction if/as needed closer to term.

## 2024-03-20 NOTE — ASSESSMENT & PLAN NOTE
Taking oral iron.  Advised repeat CBC this or next week to ensure appropriate rise and allow time for IV iron infusions if not.   Patient states understanding.

## 2024-03-23 ENCOUNTER — LAB (OUTPATIENT)
Dept: LAB | Facility: HOSPITAL | Age: 29
End: 2024-03-23
Payer: COMMERCIAL

## 2024-03-23 DIAGNOSIS — O99.019 MATERNAL ANEMIA IN PREGNANCY, ANTEPARTUM: ICD-10-CM

## 2024-03-23 LAB
ERYTHROCYTE [DISTWIDTH] IN BLOOD BY AUTOMATED COUNT: 13.3 % (ref 11.6–15.1)
FERRITIN SERPL-MCNC: 11 NG/ML (ref 11–307)
HCT VFR BLD AUTO: 32.4 % (ref 34.8–46.1)
HGB BLD-MCNC: 10.5 G/DL (ref 11.5–15.4)
MCH RBC QN AUTO: 28.5 PG (ref 26.8–34.3)
MCHC RBC AUTO-ENTMCNC: 32.4 G/DL (ref 31.4–37.4)
MCV RBC AUTO: 88 FL (ref 82–98)
PLATELET # BLD AUTO: 288 THOUSANDS/UL (ref 149–390)
PMV BLD AUTO: 11.4 FL (ref 8.9–12.7)
RBC # BLD AUTO: 3.68 MILLION/UL (ref 3.81–5.12)
WBC # BLD AUTO: 8.09 THOUSAND/UL (ref 4.31–10.16)

## 2024-03-23 PROCEDURE — 82728 ASSAY OF FERRITIN: CPT

## 2024-03-23 PROCEDURE — 36415 COLL VENOUS BLD VENIPUNCTURE: CPT

## 2024-03-23 PROCEDURE — 85027 COMPLETE CBC AUTOMATED: CPT

## 2024-03-24 NOTE — RESULT ENCOUNTER NOTE
Cbc stable  Ferritin at lower end of normal   Continue with oral iron until approx 6 wks post delivery

## 2024-03-29 ENCOUNTER — ULTRASOUND (OUTPATIENT)
Dept: PERINATAL CARE | Facility: OTHER | Age: 29
End: 2024-03-29
Payer: COMMERCIAL

## 2024-03-29 VITALS
SYSTOLIC BLOOD PRESSURE: 114 MMHG | BODY MASS INDEX: 31.02 KG/M2 | DIASTOLIC BLOOD PRESSURE: 56 MMHG | WEIGHT: 158 LBS | HEIGHT: 60 IN | HEART RATE: 99 BPM

## 2024-03-29 DIAGNOSIS — O34.219 HISTORY OF CESAREAN DELIVERY, ANTEPARTUM: ICD-10-CM

## 2024-03-29 DIAGNOSIS — Z3A.36 36 WEEKS GESTATION OF PREGNANCY: Primary | ICD-10-CM

## 2024-03-29 DIAGNOSIS — Z87.74 HISTORY OF CONGENITAL HEART DEFECT: ICD-10-CM

## 2024-03-29 DIAGNOSIS — Z82.79 FAMILY HISTORY OF CONGENITAL OR GENETIC CONDITION: ICD-10-CM

## 2024-03-29 DIAGNOSIS — Z36.89 ENCOUNTER FOR ULTRASOUND TO CHECK FETAL GROWTH: ICD-10-CM

## 2024-03-29 PROCEDURE — 76816 OB US FOLLOW-UP PER FETUS: CPT | Performed by: STUDENT IN AN ORGANIZED HEALTH CARE EDUCATION/TRAINING PROGRAM

## 2024-03-29 NOTE — PROGRESS NOTES
This patient received  care under my supervision on 24 at 36w1d gestational age at [facility].  Consultation if performed is located within ultrasound report under OB procedures tab.  -Mahsa Sheldon MD

## 2024-04-03 ENCOUNTER — ROUTINE PRENATAL (OUTPATIENT)
Dept: OBGYN CLINIC | Facility: CLINIC | Age: 29
End: 2024-04-03
Payer: COMMERCIAL

## 2024-04-03 VITALS — WEIGHT: 164 LBS | BODY MASS INDEX: 32.03 KG/M2 | DIASTOLIC BLOOD PRESSURE: 60 MMHG | SYSTOLIC BLOOD PRESSURE: 112 MMHG

## 2024-04-03 DIAGNOSIS — Z34.83 ENCOUNTER FOR SUPERVISION OF OTHER NORMAL PREGNANCY, THIRD TRIMESTER: Primary | ICD-10-CM

## 2024-04-03 DIAGNOSIS — O99.019 MATERNAL ANEMIA IN PREGNANCY, ANTEPARTUM: ICD-10-CM

## 2024-04-03 DIAGNOSIS — O34.219 HISTORY OF CESAREAN DELIVERY, ANTEPARTUM: ICD-10-CM

## 2024-04-03 DIAGNOSIS — Z3A.36 36 WEEKS GESTATION OF PREGNANCY: ICD-10-CM

## 2024-04-03 LAB
SL AMB  POCT GLUCOSE, UA: NORMAL
SL AMB POCT URINE PROTEIN: NORMAL

## 2024-04-03 PROCEDURE — 87150 DNA/RNA AMPLIFIED PROBE: CPT | Performed by: PHYSICIAN ASSISTANT

## 2024-04-03 PROCEDURE — PNV: Performed by: PHYSICIAN ASSISTANT

## 2024-04-03 PROCEDURE — 81002 URINALYSIS NONAUTO W/O SCOPE: CPT | Performed by: PHYSICIAN ASSISTANT

## 2024-04-03 NOTE — ASSESSMENT & PLAN NOTE
Nessa  is a 28 y.o.  @36w6d who presents for routine prenatal visit.      Discussed ARRIVE trial and IOL options. Interested in post date IOL    Growth scan - EFW39% at 36 wks  GBS collected today  Consents and plan in media    Reports good fetal movement.  Denies LOF, vaginal bleeding, regular uterine contractions, cramping, headaches or visual changes.      Reviewed PTL/Labor precautions and FKC.

## 2024-04-03 NOTE — PROGRESS NOTES
Prenatal Visit   36w6d    PN1 completed  Nuchal completed   AFP completed  Level 2 completed  28 week labs completed   Consents signed prev  Breast pump ordered prev  Tdap UTD  GBS today     Denies LOF, VB, or CTX.  Pt has +FM  Patients urine is   Patient has no concerns today

## 2024-04-03 NOTE — PROGRESS NOTES
36 weeks gestation of pregnancy  Nessa  is a 28 y.o.  @36w6d who presents for routine prenatal visit.      Discussed ARRIVE trial and IOL options. Interested in post date IOL    Growth scan - EFW39% at 36 wks  GBS collected today  Consents and plan in media    Reports good fetal movement.  Denies LOF, vaginal bleeding, regular uterine contractions, cramping, headaches or visual changes.      Reviewed PTL/Labor precautions and FKC.        History of  delivery, antepartum  Desires TOLAC  EFW39% at 36 wks    Maternal anemia in pregnancy, antepartum  Taking PO iron     What Type Of Note Output Would You Prefer (Optional)?: Standard Output What Is The Reason For Today's Visit?: Full Body Skin Examination What Is The Reason For Today's Visit? (Being Monitored For X): the development of new lesions How Severe Are Your Spot(S)?: mild Additional History: Pt states she has one lesion of concern on her left forearm that is raised and scaly. Pt states the lesion has been present for roughly 6months. Pt has no other concerning lesions today.

## 2024-04-05 LAB — GP B STREP DNA SPEC QL NAA+PROBE: NEGATIVE

## 2024-04-10 ENCOUNTER — ROUTINE PRENATAL (OUTPATIENT)
Dept: OBGYN CLINIC | Facility: CLINIC | Age: 29
End: 2024-04-10
Payer: COMMERCIAL

## 2024-04-10 VITALS
DIASTOLIC BLOOD PRESSURE: 76 MMHG | WEIGHT: 163.2 LBS | SYSTOLIC BLOOD PRESSURE: 110 MMHG | BODY MASS INDEX: 31.87 KG/M2 | HEART RATE: 104 BPM

## 2024-04-10 DIAGNOSIS — Z34.83 PRENATAL CARE, SUBSEQUENT PREGNANCY, THIRD TRIMESTER: Primary | ICD-10-CM

## 2024-04-10 DIAGNOSIS — O99.019 MATERNAL ANEMIA IN PREGNANCY, ANTEPARTUM: ICD-10-CM

## 2024-04-10 DIAGNOSIS — O34.219 HISTORY OF CESAREAN DELIVERY, ANTEPARTUM: ICD-10-CM

## 2024-04-10 DIAGNOSIS — Z3A.37 37 WEEKS GESTATION OF PREGNANCY: ICD-10-CM

## 2024-04-10 LAB
SL AMB  POCT GLUCOSE, UA: NORMAL
SL AMB POCT URINE PROTEIN: NORMAL

## 2024-04-10 PROCEDURE — PNV: Performed by: PHYSICIAN ASSISTANT

## 2024-04-10 PROCEDURE — 81002 URINALYSIS NONAUTO W/O SCOPE: CPT | Performed by: PHYSICIAN ASSISTANT

## 2024-04-10 NOTE — PROGRESS NOTES
Patient here for prenatal visit.  She is having pelvic pressure; hand swelling and numbness in  her fingers; denies spotting or LOF.   She desires cervical check.  Good fetal movement.  Delivery consent signed.  GBS neg  Urine neg for protein and glucose.   Refill trintellix

## 2024-04-10 NOTE — PROGRESS NOTES
Problem List Items Addressed This Visit       37 weeks gestation of pregnancy     Nessa  is a 28 y.o.  @37w6d who presents for routine prenatal visit.      39 week EIOL discussed   Vaccines - declined   Delivery consent and birth plan, on file   GBS - neg   Plans to breastfeed. Pump - has   Perineal massage - encouraged, not yet performing   Ped - [facility]   TWG 12.3 kg (27 lb 3.2 oz)    Reports good fetal movement.  Denies LOF, vaginal bleeding, regular uterine contractions, cramping, headaches or visual changes.      Reviewed PTL/Labor precautions and FKC.             History of  delivery, antepartum     Due to breech presentation. Desires tolac.           Maternal anemia in pregnancy, antepartum     Other Visit Diagnoses       Prenatal care, subsequent pregnancy, third trimester    -  Primary    Relevant Orders    POCT urine dip

## 2024-04-10 NOTE — ASSESSMENT & PLAN NOTE
Nessa  is a 28 y.o.  @37w6d who presents for routine prenatal visit.      39 week EIOL discussed   Vaccines - declined   Delivery consent and birth plan, on file   GBS - neg   Plans to breastfeed. Pump - has   Perineal massage - encouraged, not yet performing   Ped - [facility]   TWG 12.3 kg (27 lb 3.2 oz)    Reports good fetal movement.  Denies LOF, vaginal bleeding, regular uterine contractions, cramping, headaches or visual changes.      Reviewed PTL/Labor precautions and FKC.

## 2024-04-17 ENCOUNTER — TELEPHONE (OUTPATIENT)
Dept: OBGYN CLINIC | Facility: CLINIC | Age: 29
End: 2024-04-17

## 2024-04-17 ENCOUNTER — ROUTINE PRENATAL (OUTPATIENT)
Dept: OBGYN CLINIC | Facility: CLINIC | Age: 29
End: 2024-04-17
Payer: COMMERCIAL

## 2024-04-17 VITALS — DIASTOLIC BLOOD PRESSURE: 80 MMHG | WEIGHT: 167 LBS | BODY MASS INDEX: 32.61 KG/M2 | SYSTOLIC BLOOD PRESSURE: 110 MMHG

## 2024-04-17 DIAGNOSIS — O34.219 HISTORY OF CESAREAN DELIVERY, ANTEPARTUM: ICD-10-CM

## 2024-04-17 DIAGNOSIS — Z34.83 PRENATAL CARE, SUBSEQUENT PREGNANCY, THIRD TRIMESTER: Primary | ICD-10-CM

## 2024-04-17 DIAGNOSIS — Z3A.38 38 WEEKS GESTATION OF PREGNANCY: ICD-10-CM

## 2024-04-17 LAB
SL AMB  POCT GLUCOSE, UA: NORMAL
SL AMB POCT URINE PROTEIN: NORMAL

## 2024-04-17 PROCEDURE — 81002 URINALYSIS NONAUTO W/O SCOPE: CPT | Performed by: PHYSICIAN ASSISTANT

## 2024-04-17 PROCEDURE — PNV: Performed by: PHYSICIAN ASSISTANT

## 2024-04-17 NOTE — PROGRESS NOTES
Prenatal Visit   38w6d    PN1 completed  Nuchal completed   AFP completed  Level 2 completed  28 week labs completed   Consents signed prev  Breast pump ordered prev  Tdap UTD  Cervix check today      Denies LOF, VB, or CTX.  Pt has +FM  Patients urine is   Patient has no concerns today

## 2024-04-17 NOTE — ASSESSMENT & PLAN NOTE
Nessa  is a 28 y.o.  @38w6d who presents for routine prenatal visit.      IOL discussed. Desires post date IOL. Request sent today. Consents signed. Cervix 0/50/-4. Plan with ripening    Growth scan - EFW39% at 36 wks   GBS negative  Delivery consents and plan in media    Reports good fetal movement.  Denies LOF, vaginal bleeding, regular uterine contractions, cramping, headaches or visual changes.      Reviewed PTL/Labor precautions and FKC.

## 2024-04-17 NOTE — PROGRESS NOTES
38 weeks gestation of pregnancy  Nessa  is a 28 y.o.  @38w6d who presents for routine prenatal visit.      IOL discussed. Desires post date IOL. Request sent today. Consents signed. Cervix 0/50/-4. Plan with ripening    Growth scan - EFW39% at 36 wks   GBS negative  Delivery consents and plan in media    Reports good fetal movement.  Denies LOF, vaginal bleeding, regular uterine contractions, cramping, headaches or visual changes.      Reviewed PTL/Labor precautions and FKC.        History of  delivery, antepartum  Desires TOLAC  IOL request sent today

## 2024-04-17 NOTE — TELEPHONE ENCOUNTER
----- Message from Mary Martinez PA-C sent at 4/17/2024  4:08 PM EDT -----  Regarding: IOL  Procedure to be scheduled (IOL or CS): IOL  MELODIE: Estimated Date of Delivery: 4/25/24  Indication for delivery: post date IOL  Requested date (s) of delivery: any time after MELODIE    If requested date is unavailable, is there a date by which the pt must be delivered? Early 41  Physician preference: n/a    If IOL, anticipated method: plan with ripening  If CS, with or without tubal: n/a         Detail Level: Detailed Topical Retinoid Pregnancy And Lactation Text: This medication is Pregnancy Category C. It is unknown if this medication is excreted in breast milk. High Dose Vitamin A Counseling: Side effects reviewed, pt to contact office should one occur. Azithromycin Counseling:  I discussed with the patient the risks of azithromycin including but not limited to GI upset, allergic reaction, drug rash, diarrhea, and yeast infections. Sarecycline Counseling: Patient advised regarding possible photosensitivity and discoloration of the teeth, skin, lips, tongue and gums.  Patient instructed to avoid sunlight, if possible.  When exposed to sunlight, patients should wear protective clothing, sunglasses, and sunscreen.  The patient was instructed to call the office immediately if the following severe adverse effects occur:  hearing changes, easy bruising/bleeding, severe headache, or vision changes.  The patient verbalized understanding of the proper use and possible adverse effects of sarecycline.  All of the patient's questions and concerns were addressed. Tetracycline Counseling: Patient counseled regarding possible photosensitivity and increased risk for sunburn.  Patient instructed to avoid sunlight, if possible.  When exposed to sunlight, patients should wear protective clothing, sunglasses, and sunscreen.  The patient was instructed to call the office immediately if the following severe adverse effects occur:  hearing changes, easy bruising/bleeding, severe headache, or vision changes.  The patient verbalized understanding of the proper use and possible adverse effects of tetracycline.  All of the patient's questions and concerns were addressed. Patient understands to avoid pregnancy while on therapy due to potential birth defects. Doxycycline Pregnancy And Lactation Text: This medication is Pregnancy Category D and not consider safe during pregnancy. It is also excreted in breast milk but is considered safe for shorter treatment courses. Isotretinoin Pregnancy And Lactation Text: This medication is Pregnancy Category X and is considered extremely dangerous during pregnancy. It is unknown if it is excreted in breast milk. Topical Clindamycin Pregnancy And Lactation Text: This medication is Pregnancy Category B and is considered safe during pregnancy. It is unknown if it is excreted in breast milk. Birth Control Pills Counseling: Birth Control Pill Counseling: I discussed with the patient the potential side effects of OCPs including but not limited to increased risk of stroke, heart attack, thrombophlebitis, deep venous thrombosis, hepatic adenomas, breast changes, GI upset, headaches, and depression.  The patient verbalized understanding of the proper use and possible adverse effects of OCPs. All of the patient's questions and concerns were addressed. Include Pregnancy/Lactation Warning?: No Dapsone Counseling: I discussed with the patient the risks of dapsone including but not limited to hemolytic anemia, agranulocytosis, rashes, methemoglobinemia, kidney failure, peripheral neuropathy, headaches, GI upset, and liver toxicity.  Patients who start dapsone require monitoring including baseline LFTs and weekly CBCs for the first month, then every month thereafter.  The patient verbalized understanding of the proper use and possible adverse effects of dapsone.  All of the patient's questions and concerns were addressed. Benzoyl Peroxide Counseling: Patient counseled that medicine may cause skin irritation and bleach clothing.  In the event of skin irritation, the patient was advised to reduce the amount of the drug applied or use it less frequently.   The patient verbalized understanding of the proper use and possible adverse effects of benzoyl peroxide.  All of the patient's questions and concerns were addressed. Detail Level: Zone Tazorac Counseling:  Patient advised that medication is irritating and drying.  Patient may need to apply sparingly and wash off after an hour before eventually leaving it on overnight.  The patient verbalized understanding of the proper use and possible adverse effects of tazorac.  All of the patient's questions and concerns were addressed. High Dose Vitamin A Pregnancy And Lactation Text: High dose vitamin A therapy is contraindicated during pregnancy and breast feeding. Topical Sulfur Applications Counseling: Topical Sulfur Counseling: Patient counseled that this medication may cause skin irritation or allergic reactions.  In the event of skin irritation, the patient was advised to reduce the amount of the drug applied or use it less frequently.   The patient verbalized understanding of the proper use and possible adverse effects of topical sulfur application.  All of the patient's questions and concerns were addressed. Azithromycin Pregnancy And Lactation Text: This medication is considered safe during pregnancy and is also secreted in breast milk. Sarecycline Pregnancy And Lactation Text: This medication is Pregnancy Category D and not consider safe during pregnancy. It is also excreted in breast milk. Birth Control Pills Pregnancy And Lactation Text: This medication should be avoided if pregnant and for the first 30 days post-partum. Erythromycin Counseling:  I discussed with the patient the risks of erythromycin including but not limited to GI upset, allergic reaction, drug rash, diarrhea, increase in liver enzymes, and yeast infections. Benzoyl Peroxide Pregnancy And Lactation Text: This medication is Pregnancy Category C. It is unknown if benzoyl peroxide is excreted in breast milk. Bactrim Pregnancy And Lactation Text: This medication is Pregnancy Category D and is known to cause fetal risk.  It is also excreted in breast milk. Minocycline Counseling: Patient advised regarding possible photosensitivity and discoloration of the teeth, skin, lips, tongue and gums.  Patient instructed to avoid sunlight, if possible.  When exposed to sunlight, patients should wear protective clothing, sunglasses, and sunscreen.  The patient was instructed to call the office immediately if the following severe adverse effects occur:  hearing changes, easy bruising/bleeding, severe headache, or vision changes.  The patient verbalized understanding of the proper use and possible adverse effects of minocycline.  All of the patient's questions and concerns were addressed. Dapsone Pregnancy And Lactation Text: This medication is Pregnancy Category C and is not considered safe during pregnancy or breast feeding. Erythromycin Pregnancy And Lactation Text: This medication is Pregnancy Category B and is considered safe during pregnancy. It is also excreted in breast milk. Tazorac Pregnancy And Lactation Text: This medication is not safe during pregnancy. It is unknown if this medication is excreted in breast milk. Bactrim Counseling:  I discussed with the patient the risks of sulfa antibiotics including but not limited to GI upset, allergic reaction, drug rash, diarrhea, dizziness, photosensitivity, and yeast infections.  Rarely, more serious reactions can occur including but not limited to aplastic anemia, agranulocytosis, methemoglobinemia, blood dyscrasias, liver or kidney failure, lung infiltrates or desquamative/blistering drug rashes. Spironolactone Counseling: Patient advised regarding risks of diarrhea, abdominal pain, hyperkalemia, birth defects (for female patients), liver toxicity and renal toxicity. The patient may need blood work to monitor liver and kidney function and potassium levels while on therapy. The patient verbalized understanding of the proper use and possible adverse effects of spironolactone.  All of the patient's questions and concerns were addressed. Topical Sulfur Applications Pregnancy And Lactation Text: This medication is Pregnancy Category C and has an unknown safety profile during pregnancy. It is unknown if this topical medication is excreted in breast milk. Winlevi Pregnancy And Lactation Text: This medication is considered safe during pregnancy and breastfeeding. Topical Retinoid counseling:  Patient advised to apply a pea-sized amount only at bedtime and wait 30 minutes after washing their face before applying.  If too drying, patient may add a non-comedogenic moisturizer. The patient verbalized understanding of the proper use and possible adverse effects of retinoids.  All of the patient's questions and concerns were addressed. Doxycycline Counseling:  Patient counseled regarding possible photosensitivity and increased risk for sunburn.  Patient instructed to avoid sunlight, if possible.  When exposed to sunlight, patients should wear protective clothing, sunglasses, and sunscreen.  The patient was instructed to call the office immediately if the following severe adverse effects occur:  hearing changes, easy bruising/bleeding, severe headache, or vision changes.  The patient verbalized understanding of the proper use and possible adverse effects of doxycycline.  All of the patient's questions and concerns were addressed. Topical Clindamycin Counseling: Patient counseled that this medication may cause skin irritation or allergic reactions.  In the event of skin irritation, the patient was advised to reduce the amount of the drug applied or use it less frequently.   The patient verbalized understanding of the proper use and possible adverse effects of clindamycin.  All of the patient's questions and concerns were addressed. Spironolactone Pregnancy And Lactation Text: This medication can cause feminization of the male fetus and should be avoided during pregnancy. The active metabolite is also found in breast milk. Winlevi Counseling:  I discussed with the patient the risks of topical clascoterone including but not limited to erythema, scaling, itching, and stinging. Patient voiced their understanding. Isotretinoin Counseling: Patient should get monthly blood tests, not donate blood, not drive at night if vision affected, not share medication, and not undergo elective surgery for 6 months after tx completed. Side effects reviewed, pt to contact office should one occur. Aklief Pregnancy And Lactation Text: It is unknown if this medication is safe to use during pregnancy.  It is unknown if this medication is excreted in breast milk.  Breastfeeding women should use the topical cream on the smallest area of the skin for the shortest time needed while breastfeeding.  Do not apply to nipple and areola. Azelaic Acid Counseling: Patient counseled that medicine may cause skin irritation and to avoid applying near the eyes.  In the event of skin irritation, the patient was advised to reduce the amount of the drug applied or use it less frequently.   The patient verbalized understanding of the proper use and possible adverse effects of azelaic acid.  All of the patient's questions and concerns were addressed. Azelaic Acid Pregnancy And Lactation Text: This medication is considered safe during pregnancy and breast feeding. Aklief counseling:  Patient advised to apply a pea-sized amount only at bedtime and wait 30 minutes after washing their face before applying.  If too drying, patient may add a non-comedogenic moisturizer.  The most commonly reported side effects including irritation, redness, scaling, dryness, stinging, burning, itching, and increased risk of sunburn.  The patient verbalized understanding of the proper use and possible adverse effects of retinoids.  All of the patient's questions and concerns were addressed. Detail Level: Generalized

## 2024-04-19 NOTE — TELEPHONE ENCOUNTER
Per Griselle on L&D no IOL pm spots available from now until 4/26 need to resend request on Monday 4/22

## 2024-04-22 NOTE — TELEPHONE ENCOUNTER
Patient would prefer to be after her due date and not the day before into 4/25. Patient aware I am unable to secure that date as of now- due to 7 day rule but will keept attempting as needed until a spot is secure.

## 2024-04-23 PROBLEM — Z3A.39 39 WEEKS GESTATION OF PREGNANCY: Status: ACTIVE | Noted: 2023-10-18

## 2024-04-23 NOTE — TELEPHONE ENCOUNTER
SCHEDULED 4/29/24@8-P to 4/30/24 tolac, IOL.     Patient notified of IOL date,time,and location. Advised patient she may eat a light breakfast/dinner prior to going to L&D. In the interim please report any vaginal bleeding,leakage of fluid,decreased fetal movement or contractions.Reviewed fetal kick counts. Advised to keep all upcoming prenatal visits.

## 2024-04-23 NOTE — ASSESSMENT & PLAN NOTE
-precautions reviewed  -GBS negative  -IOL scheduled 4/29   -Tdap/flu vaccines declined  -prepregnancy BMI 26 with goal weight gain 15-25#: TWG = 29#

## 2024-04-24 ENCOUNTER — ROUTINE PRENATAL (OUTPATIENT)
Dept: OBGYN CLINIC | Facility: CLINIC | Age: 29
End: 2024-04-24
Payer: COMMERCIAL

## 2024-04-24 VITALS — WEIGHT: 165 LBS | DIASTOLIC BLOOD PRESSURE: 82 MMHG | SYSTOLIC BLOOD PRESSURE: 112 MMHG | BODY MASS INDEX: 32.22 KG/M2

## 2024-04-24 DIAGNOSIS — O34.219 HISTORY OF CESAREAN DELIVERY, ANTEPARTUM: Primary | ICD-10-CM

## 2024-04-24 DIAGNOSIS — Z3A.39 39 WEEKS GESTATION OF PREGNANCY: ICD-10-CM

## 2024-04-24 LAB
SL AMB  POCT GLUCOSE, UA: NEGATIVE
SL AMB POCT URINE PROTEIN: NEGATIVE

## 2024-04-24 PROCEDURE — 81002 URINALYSIS NONAUTO W/O SCOPE: CPT | Performed by: STUDENT IN AN ORGANIZED HEALTH CARE EDUCATION/TRAINING PROGRAM

## 2024-04-24 PROCEDURE — PNV: Performed by: STUDENT IN AN ORGANIZED HEALTH CARE EDUCATION/TRAINING PROGRAM

## 2024-04-24 NOTE — PROGRESS NOTES
Pt is here for routine ob visit   No concerns at this time  Urine neg/neg   No LOF,VB,Contractions   +FM   UTD tdap  Delivery consent signed at previous visit   GBS negative   Induction scheduled for 4/29

## 2024-04-24 NOTE — PROGRESS NOTES
29 y.o.  at 39w6d, here for routine OB visit. Feeling well overall and without concerns. Good FM. Denies LOF, VB, contractions. No questions/concerns.     Problem List Items Addressed This Visit          Surgery/Wound/Pain    History of  delivery, antepartum - Primary       Obstetrics/Gynecology    39 weeks gestation of pregnancy     -precautions reviewed  -GBS negative  -IOL scheduled    -Tdap/flu vaccines declined  -prepregnancy BMI 26 with goal weight gain 15-25#: TWG = 29#

## 2024-04-29 ENCOUNTER — HOSPITAL ENCOUNTER (OUTPATIENT)
Dept: LABOR AND DELIVERY | Facility: HOSPITAL | Age: 29
Discharge: HOME | End: 2024-04-29
Payer: COMMERCIAL

## 2024-04-29 ENCOUNTER — HOSPITAL ENCOUNTER (INPATIENT)
Facility: HOSPITAL | Age: 29
LOS: 3 days | Discharge: HOME | End: 2024-05-02
Attending: OBSTETRICS & GYNECOLOGY | Admitting: OBSTETRICS & GYNECOLOGY
Payer: COMMERCIAL

## 2024-04-29 DIAGNOSIS — O34.219 VAGINAL BIRTH AFTER CESAREAN (VBAC): ICD-10-CM

## 2024-04-29 DIAGNOSIS — Z3A.39 39 WEEKS GESTATION OF PREGNANCY: Primary | ICD-10-CM

## 2024-04-29 PROBLEM — Z3A.40 40 WEEKS GESTATION OF PREGNANCY: Status: ACTIVE | Noted: 2023-10-18

## 2024-04-29 LAB
ABO GROUP BLD: NORMAL
BLD GP AB SCN SERPL QL: NEGATIVE
ERYTHROCYTE [DISTWIDTH] IN BLOOD BY AUTOMATED COUNT: 15.4 % (ref 11.6–15.1)
HCT VFR BLD AUTO: 32.1 % (ref 34.8–46.1)
HGB BLD-MCNC: 10.2 G/DL (ref 11.5–15.4)
HOLD SPECIMEN: NORMAL
MCH RBC QN AUTO: 27.3 PG (ref 26.8–34.3)
MCHC RBC AUTO-ENTMCNC: 31.8 G/DL (ref 31.4–37.4)
MCV RBC AUTO: 86 FL (ref 82–98)
PLATELET # BLD AUTO: 229 THOUSANDS/UL (ref 149–390)
PMV BLD AUTO: 12.6 FL (ref 8.9–12.7)
RBC # BLD AUTO: 3.73 MILLION/UL (ref 3.81–5.12)
RH BLD: POSITIVE
SPECIMEN EXPIRATION DATE: NORMAL
WBC # BLD AUTO: 6.9 THOUSAND/UL (ref 4.31–10.16)

## 2024-04-29 PROCEDURE — 86900 BLOOD TYPING SEROLOGIC ABO: CPT

## 2024-04-29 PROCEDURE — 86901 BLOOD TYPING SEROLOGIC RH(D): CPT

## 2024-04-29 PROCEDURE — NC001 PR NO CHARGE: Performed by: OBSTETRICS & GYNECOLOGY

## 2024-04-29 PROCEDURE — 4A1HXCZ MONITORING OF PRODUCTS OF CONCEPTION, CARDIAC RATE, EXTERNAL APPROACH: ICD-10-PCS | Performed by: OBSTETRICS & GYNECOLOGY

## 2024-04-29 PROCEDURE — 3E033VJ INTRODUCTION OF OTHER HORMONE INTO PERIPHERAL VEIN, PERCUTANEOUS APPROACH: ICD-10-PCS | Performed by: OBSTETRICS & GYNECOLOGY

## 2024-04-29 PROCEDURE — 85027 COMPLETE CBC AUTOMATED: CPT

## 2024-04-29 PROCEDURE — 86850 RBC ANTIBODY SCREEN: CPT

## 2024-04-29 RX ORDER — SODIUM CHLORIDE, SODIUM LACTATE, POTASSIUM CHLORIDE, CALCIUM CHLORIDE 600; 310; 30; 20 MG/100ML; MG/100ML; MG/100ML; MG/100ML
125 INJECTION, SOLUTION INTRAVENOUS CONTINUOUS
Status: DISCONTINUED | OUTPATIENT
Start: 2024-04-29 | End: 2024-05-02 | Stop reason: HOSPADM

## 2024-04-29 RX ADMIN — SODIUM CHLORIDE, SODIUM LACTATE, POTASSIUM CHLORIDE, AND CALCIUM CHLORIDE 125 ML/HR: .6; .31; .03; .02 INJECTION, SOLUTION INTRAVENOUS at 21:04

## 2024-04-30 ENCOUNTER — ANESTHESIA EVENT (INPATIENT)
Dept: ANESTHESIOLOGY | Facility: HOSPITAL | Age: 29
End: 2024-04-30
Payer: COMMERCIAL

## 2024-04-30 ENCOUNTER — ANESTHESIA (INPATIENT)
Dept: ANESTHESIOLOGY | Facility: HOSPITAL | Age: 29
End: 2024-04-30
Payer: COMMERCIAL

## 2024-04-30 PROBLEM — O34.219 VAGINAL BIRTH AFTER CESAREAN (VBAC): Status: ACTIVE | Noted: 2023-10-18

## 2024-04-30 LAB
BASE EXCESS BLDCOA CALC-SCNC: -11.2 MMOL/L (ref 3–11)
BASE EXCESS BLDCOV CALC-SCNC: -8.4 MMOL/L (ref 1–9)
HCO3 BLDCOA-SCNC: 18.6 MMOL/L (ref 17.3–27.3)
HCO3 BLDCOV-SCNC: 18.8 MMOL/L (ref 12.2–28.6)
O2 CT VFR BLDCOA CALC: 3.8 ML/DL
OXYHGB MFR BLDCOA: 16 %
OXYHGB MFR BLDCOV: 65.7 %
PCO2 BLDCOA: 56.8 MM[HG] (ref 30–60)
PCO2 BLDCOV: 44.3 MM HG (ref 27–43)
PH BLDCOA: 7.13 [PH] (ref 7.23–7.43)
PH BLDCOV: 7.25 [PH] (ref 7.19–7.49)
PO2 BLDCOA: 12.3 MM HG (ref 5–25)
PO2 BLDCOV: 29.4 MM HG (ref 15–45)
SAO2 % BLDCOV: 16.5 ML/DL

## 2024-04-30 PROCEDURE — 3E0E7GC INTRODUCTION OF OTHER THERAPEUTIC SUBSTANCE INTO PRODUCTS OF CONCEPTION, VIA NATURAL OR ARTIFICIAL OPENING: ICD-10-PCS | Performed by: OBSTETRICS & GYNECOLOGY

## 2024-04-30 PROCEDURE — 0UQMXZZ REPAIR VULVA, EXTERNAL APPROACH: ICD-10-PCS | Performed by: OBSTETRICS & GYNECOLOGY

## 2024-04-30 PROCEDURE — 88307 TISSUE EXAM BY PATHOLOGIST: CPT | Performed by: PATHOLOGY

## 2024-04-30 PROCEDURE — 10H07YZ INSERTION OF OTHER DEVICE INTO PRODUCTS OF CONCEPTION, VIA NATURAL OR ARTIFICIAL OPENING: ICD-10-PCS | Performed by: OBSTETRICS & GYNECOLOGY

## 2024-04-30 PROCEDURE — 82805 BLOOD GASES W/O2 SATURATION: CPT | Performed by: OBSTETRICS & GYNECOLOGY

## 2024-04-30 RX ORDER — IBUPROFEN 600 MG/1
600 TABLET ORAL EVERY 6 HOURS
Status: DISCONTINUED | OUTPATIENT
Start: 2024-04-30 | End: 2024-05-02 | Stop reason: HOSPADM

## 2024-04-30 RX ORDER — MORPHINE SULFATE 4 MG/ML
4 INJECTION, SOLUTION INTRAMUSCULAR; INTRAVENOUS ONCE
Status: COMPLETED | OUTPATIENT
Start: 2024-04-30 | End: 2024-04-30

## 2024-04-30 RX ORDER — FERROUS SULFATE 325(65) MG
325 TABLET ORAL
Status: DISCONTINUED | OUTPATIENT
Start: 2024-05-01 | End: 2024-05-02 | Stop reason: HOSPADM

## 2024-04-30 RX ORDER — OXYTOCIN/RINGER'S LACTATE 30/500 ML
1-30 PLASTIC BAG, INJECTION (ML) INTRAVENOUS
Status: DISCONTINUED | OUTPATIENT
Start: 2024-04-30 | End: 2024-05-02 | Stop reason: HOSPADM

## 2024-04-30 RX ORDER — SODIUM CHLORIDE, SODIUM LACTATE, POTASSIUM CHLORIDE, CALCIUM CHLORIDE 600; 310; 30; 20 MG/100ML; MG/100ML; MG/100ML; MG/100ML
100 INJECTION, SOLUTION INTRAVENOUS CONTINUOUS
Status: DISCONTINUED | OUTPATIENT
Start: 2024-04-30 | End: 2024-05-02 | Stop reason: HOSPADM

## 2024-04-30 RX ORDER — ACETAMINOPHEN 325 MG/1
975 TABLET ORAL EVERY 8 HOURS PRN
Status: DISCONTINUED | OUTPATIENT
Start: 2024-04-30 | End: 2024-04-30

## 2024-04-30 RX ORDER — BENZOCAINE/MENTHOL 6 MG-10 MG
1 LOZENGE MUCOUS MEMBRANE DAILY PRN
Status: DISCONTINUED | OUTPATIENT
Start: 2024-04-30 | End: 2024-05-02 | Stop reason: HOSPADM

## 2024-04-30 RX ORDER — PROMETHAZINE HYDROCHLORIDE 25 MG/ML
25 INJECTION, SOLUTION INTRAMUSCULAR; INTRAVENOUS ONCE
Status: COMPLETED | OUTPATIENT
Start: 2024-04-30 | End: 2024-04-30

## 2024-04-30 RX ORDER — POLYETHYLENE GLYCOL 3350 17 G/17G
17 POWDER, FOR SOLUTION ORAL DAILY PRN
Status: DISCONTINUED | OUTPATIENT
Start: 2024-04-30 | End: 2024-05-02 | Stop reason: HOSPADM

## 2024-04-30 RX ORDER — LIDOCAINE HYDROCHLORIDE AND EPINEPHRINE 15; 5 MG/ML; UG/ML
INJECTION, SOLUTION EPIDURAL
Status: COMPLETED | OUTPATIENT
Start: 2024-04-30 | End: 2024-04-30

## 2024-04-30 RX ORDER — ACETAMINOPHEN 325 MG/1
650 TABLET ORAL EVERY 4 HOURS PRN
Status: DISCONTINUED | OUTPATIENT
Start: 2024-04-30 | End: 2024-05-02 | Stop reason: HOSPADM

## 2024-04-30 RX ORDER — ONDANSETRON 2 MG/ML
4 INJECTION INTRAMUSCULAR; INTRAVENOUS EVERY 6 HOURS PRN
Status: DISCONTINUED | OUTPATIENT
Start: 2024-04-30 | End: 2024-05-02 | Stop reason: HOSPADM

## 2024-04-30 RX ORDER — SENNOSIDES 8.6 MG
1 TABLET ORAL DAILY PRN
Status: DISCONTINUED | OUTPATIENT
Start: 2024-04-30 | End: 2024-05-02 | Stop reason: HOSPADM

## 2024-04-30 RX ADMIN — ONDANSETRON 4 MG: 2 INJECTION INTRAMUSCULAR; INTRAVENOUS at 14:02

## 2024-04-30 RX ADMIN — ROPIVACAINE HYDROCHLORIDE: 2 INJECTION, SOLUTION EPIDURAL; INFILTRATION at 10:09

## 2024-04-30 RX ADMIN — MORPHINE SULFATE 4 MG: 4 INJECTION INTRAVENOUS at 02:09

## 2024-04-30 RX ADMIN — SODIUM CHLORIDE, SODIUM LACTATE, POTASSIUM CHLORIDE, AND CALCIUM CHLORIDE 125 ML/HR: .6; .31; .03; .02 INJECTION, SOLUTION INTRAVENOUS at 15:27

## 2024-04-30 RX ADMIN — OXYTOCIN 2 MILLI-UNITS/MIN: 10 INJECTION INTRAVENOUS at 05:50

## 2024-04-30 RX ADMIN — ACETAMINOPHEN 975 MG: 325 TABLET, FILM COATED ORAL at 13:58

## 2024-04-30 RX ADMIN — SODIUM CHLORIDE, SODIUM LACTATE, POTASSIUM CHLORIDE, AND CALCIUM CHLORIDE 500 ML: .6; .31; .03; .02 INJECTION, SOLUTION INTRAVENOUS at 14:08

## 2024-04-30 RX ADMIN — AMPICILLIN AND SULBACTAM 3 G: 10; 5 INJECTION, POWDER, FOR SOLUTION INTRAVENOUS at 21:15

## 2024-04-30 RX ADMIN — ROPIVACAINE HYDROCHLORIDE: 2 INJECTION, SOLUTION EPIDURAL; INFILTRATION at 17:44

## 2024-04-30 RX ADMIN — AMPICILLIN AND SULBACTAM 3 G: 10; 5 INJECTION, POWDER, FOR SOLUTION INTRAVENOUS at 15:26

## 2024-04-30 RX ADMIN — LIDOCAINE HYDROCHLORIDE AND EPINEPHRINE 2 ML: 15; 5 INJECTION, SOLUTION EPIDURAL at 09:31

## 2024-04-30 RX ADMIN — LIDOCAINE HYDROCHLORIDE AND EPINEPHRINE 3 ML: 15; 5 INJECTION, SOLUTION EPIDURAL at 09:25

## 2024-04-30 RX ADMIN — PROMETHAZINE HYDROCHLORIDE 25 MG: 25 INJECTION INTRAMUSCULAR; INTRAVENOUS at 02:10

## 2024-04-30 RX ADMIN — SODIUM CHLORIDE, SODIUM LACTATE, POTASSIUM CHLORIDE, AND CALCIUM CHLORIDE 125 ML/HR: .6; .31; .03; .02 INJECTION, SOLUTION INTRAVENOUS at 05:06

## 2024-04-30 NOTE — OB LABOR/OXYTOCIN SAFETY PROGRESS
Oxytocin Safety Progress Check Note - Nessa Calabrese 29 y.o. female MRN: [id number]    Unit/Bed#: -01 Encounter: [id number]    Dose (usman-units/min) Oxytocin: 10 usman-units/min  Contraction Frequency (minutes): 3-4  Contraction Intensity: Mild  Uterine Activity Characteristics: Irregular  Cervical Dilation: 4-5        Cervical Effacement: 70  Fetal Station: -3  Baseline Rate (FHR): 135 bpm  Fetal Heart Rate (FHT): 148 BPM  FHR Category: 1               Vital Signs:   Vitals:    04/30/24 0930   BP: 112/69   Pulse: 83   Resp:    Temp:    SpO2: 98%       Notes/comments:   Patient comfortable with epidural. SVE as above. Continue pitocin titration.     Kingsley Hart MD 4/30/2024 10:43 AM

## 2024-04-30 NOTE — OB LABOR/OXYTOCIN SAFETY PROGRESS
Labor Progress Note - Nessa Calabrese 29 y.o. female MRN: [id number]    Unit/Bed#: -01 Encounter: [id number]       Contraction Frequency (minutes): 1.5-6  Contraction Intensity: Mild  Uterine Activity Characteristics: Irregular  Cervical Dilation: 2        Cervical Effacement: 50  Fetal Station: -3  Baseline Rate (FHR): 125 bpm  Fetal Heart Rate (FHT): 145 BPM  FHR Category: 1               Vital Signs:   Vitals:    04/29/24 2035   BP: 122/71   Pulse: 93   Resp: 18   Temp: 98.3 °F (36.8 °C)   SpO2: 98%       Notes/comments:   Patient with increasing pain requesting medication, SVE with cerna balloon still firmly in place, will administer morphine/phenergan for analgesia, FHT continues to be category 1      Lela Meza MD 4/30/2024 1:57 AM

## 2024-04-30 NOTE — OB LABOR/OXYTOCIN SAFETY PROGRESS
Oxytocin Safety Progress Check Note - Nessa Calabrsee 29 y.o. female MRN: [id number]    Unit/Bed#: -01 Encounter: [id number]    Dose (usman-units/min) Oxytocin: 16 usman-units/min  Contraction Frequency (minutes): 1.5-2  Contraction Intensity: Strong  Uterine Activity Characteristics: Irregular  Cervical Dilation: 5        Cervical Effacement: 80  Fetal Station: -2  Baseline Rate (FHR): 175 bpm  Fetal Heart Rate (FHT): 144 BPM  FHR Category: 1               Vital Signs:    Vitals:    04/30/24 1800   BP: 113/70   Pulse:    Resp:    Temp:    SpO2:        Notes/comments:   Cervical exam as above.  Discussed that it could be a small amount of change or a different examiner.  Will continue pitocin titration as needed.  Nursing stopped amnioinfusion continuous fluids as there was not good return.  Will restart as needed.  Plan of care including definition of failed induction of labor discussed with patient who states understanding.       Gwendolyn Mendoza MD 4/30/2024 6:02 PM

## 2024-04-30 NOTE — OB LABOR/OXYTOCIN SAFETY PROGRESS
Oxytocin Safety Progress Check Note - Nessa Calabrese 29 y.o. female MRN: [id number]    Unit/Bed#: -01 Encounter: [id number]    Dose (usman-units/min) Oxytocin: 16 usman-units/min  Contraction Frequency (minutes): 1-4  Contraction Intensity: Mild  Uterine Activity Characteristics: Irregular  Cervical Dilation: 4-5        Cervical Effacement: 70  Fetal Station: -3  Baseline Rate (FHR): 165 bpm  Fetal Heart Rate (FHT): 144 BPM  FHR Category: I               Vital Signs:   Vitals:    04/30/24 1315   BP: 120/83   Pulse:    Resp:    Temp: (!) 102.5 °F (39.2 °C)   SpO2:        Notes/comments:   Patient feeling more pressure with contractions. SVE as above, unchanged. New fetal tachycardia and maternal axillary temp of 102.5 but oral temp of 99. Plan to give tylenol and recheck temperature in one hour. Patient also have variable decelerations intermittently. Will place IUPC and start amnioinfusion. Discussed with dr luis Hernandez MD 4/30/2024 1:56 PM

## 2024-04-30 NOTE — H&P
H & P- Obstetrics   Nessa Calabrese 29 y.o. female MRN: [id number]  Unit/Bed#: -01 Encounter: [id number]    Assessment: 29 y.o.  at 40w4d admitted for induction of labor.  SVE: 0/50/ballotable  FHT: 160 BPM   Clinical EFW: 39% ; Cephalic confirmed by TAUS  GBS status: negative   Postpartum contraception plan: OCP    Plan:   * 40 weeks gestation of pregnancy  Assessment & Plan  Admit to OBGYN   Clear liquid diet   F/u T&S, CBC, RPR   IVF LR 125cc/hr   Continuous fetal monitoring and tocometry   Analgesia at maternal request   Vertex by TAUS  Induction plan: FB      Maternal anemia in pregnancy, antepartum  Assessment & Plan  Hgb 10.5 (3/2024), pending admission CBC, taking PO iron.     History of  delivery, antepartum  Assessment & Plan  Mother would like TOLAC    History of congenital heart defect  Assessment & Plan  Normal fetal echo 2023 per MFM      Discussed case and plan w/ Dr. Fletcher      Chief Complaint: induction of labor    HPI: Nessa Calabrese is a 29 y.o.  with an MELODIE of 2024, by Last Menstrual Period at 40w4d who is being admitted for induction of labor. She complains of uterine contractions, has no LOF, and reports no VB. She states she has felt good FM.    Patient Active Problem List   Diagnosis    History of congenital heart defect    Family history of breast cancer    History of anemia    40 weeks gestation of pregnancy    Supervision of normal pregnancy    History of  delivery, antepartum    Family history of congenital or genetic condition    Maternal anemia in pregnancy, antepartum       Baby complications/comments: Hx of , hx of maternal congential heart defect, hx of maternal anemia    Review of Systems negative except as noted in HPI    OB Hx:  OB History    Para Term  AB Living   2 1 1     1   SAB IAB Ectopic Multiple Live Births         0 1      # Outcome Date GA Lbr [place]/2nd Weight Sex Delivery Anes PTL Lv   2 Current            1  Term 22 39w5d  3120 g (6 lb 14.1 oz) M CS-LTranv EPI, Spinal  BRIGHT       Past Medical Hx:  Past Medical History:   Diagnosis Date    Heart valve disease     congenital patent ductus arteriosus    Status post primary low transverse  section 2022    Varicella     had vaccines       Past Surgical hx:  Past Surgical History:   Procedure Laterality Date    CARDIAC SURGERY      age 1    CHOLECYSTECTOMY  2019    AL  DELIVERY ONLY N/A 2022    Procedure:  SECTION ();  Surgeon: Arlin Gracia MD;  [facility];  Service: Obstetrics    AL EXTERNAL CEPHALIC VERSION W/WO TOCOLYSIS N/A 2022    Procedure: VERSION EXTERNAL CEPHALIC;  Surgeon: Arlin Gracia MD;  Location: [facility];  Service: Obstetrics       Social Hx:  Alcohol use: denies  Tobacco use: denies  Other substance use: denies    No Known Allergies    Medications Prior to Admission   Medication    Ferrous Sulfate (Iron) 325 (65 Fe) MG TABS    Prenatal Vit-Fe Fumarate-FA (PRENATAL PO)       Objective:  Temp:  [98.3 °F (36.8 °C)] 98.3 °F (36.8 °C)  Resp:  [18] 18  Body mass index is 32.22 kg/m².     Physical Exam:  Physical Exam  Constitutional:       General: She is not in acute distress.  HENT:      Head: Normocephalic and atraumatic.      Right Ear: External ear normal.      Left Ear: External ear normal.      Mouth/Throat:      Pharynx: Oropharynx is clear.   Eyes:      Conjunctiva/sclera: Conjunctivae normal.   Pulmonary:      Effort: Pulmonary effort is normal.   Abdominal:      Palpations: Abdomen is soft.      Comments: gravid   Neurological:      Mental Status: She is alert.            FHT:   160 BPM  Moderate variability  No decelerations    TOCO:    Contractions every 6-7 minutes    Lab Results   Component Value Date    WBC 6.90 2024    HGB 10.2 (L) 2024    HCT 32.1 (L) 2024     2024     Lab Results   Component Value Date    K 3.8 06/10/2023     06/10/2023    CO2 26  06/10/2023    BUN 18 06/10/2023    CREATININE 0.61 06/10/2023    AST 17 06/10/2023    ALT 13 06/10/2023     Prenatal Labs: Reviewed      Blood type: O+  Antibody: negative  GBS: negative  HIV: NR  Rubella: immune  Syphilis IgM/IgG: negative  HBsAg: NR  HCAb: NR  Chlamydia: negative  Gonorrhea: negative  Diabetes 1 hour screen: 113, WNL  3 hour glucose: not indictated  Platelets: 288, WNL  Hgb: 10.5  >2 Midnights  INPATIENT     Signature/Title: Sarah Valderrama DO  Date: 4/29/2024  Time: 9:45 PM

## 2024-04-30 NOTE — ANESTHESIA PROCEDURE NOTES
Epidural Block    Patient location during procedure: OB/L&D  Start time: 4/30/2024 9:23 AM  Reason for block: procedure for pain  Staffing  Performed by: Delmis Daly CRNA  Authorized by: Delmis Daly CRNA    Preanesthetic Checklist  Completed: patient identified, IV checked, site marked, risks and benefits discussed, surgical consent, monitors and equipment checked, pre-op evaluation and timeout performed  Epidural  Patient position: sitting  Prep: ChloraPrep  Sedation Level: no sedation  Patient monitoring: heart rate, frequent blood pressure checks, continuous pulse oximetry and cardiac monitor  Approach: midline  Location: lumbar, L4-5  Injection technique: TIA saline  Needle  Needle type: Tuohy   Needle gauge: 17 G  Needle insertion depth: 5 cm  Catheter type: multi-orifice  Catheter size: 19 G  Catheter at skin depth: 11 cm  Catheter securement method: clear occlusive dressing, stabilization device and tape  Test dose: negativelidocaine-epinephrine (XYLOCAINE-MPF/EPINEPHRINE) 1.5 %-1:200,000 injection 3 mL - Epidural   3 mL - 4/30/2024 9:25:00 AM  Assessment  Sensory level: T10  Number of attempts: 2negative aspiration for CSF, negative aspiration for heme and no paresthesia on injection  patient tolerated the procedure well with no immediate complications  Additional Notes  DPE with 27g, +clear csf

## 2024-04-30 NOTE — OB LABOR/OXYTOCIN SAFETY PROGRESS
Oxytocin Safety Progress Check Note - Nessa Calabrese 29 y.o. female MRN: [id number]    Unit/Bed#: -01 Encounter: [id number]    Dose (usman-units/min) Oxytocin: 2 usman-units/min  Contraction Frequency (minutes): 1-5  Contraction Intensity: Mild  Uterine Activity Characteristics: Irregular  Cervical Dilation: 3        Cervical Effacement: 70  Fetal Station: -3  Baseline Rate (FHR): 125 bpm  Fetal Heart Rate (FHT): 135 BPM  FHR Category: cat 1               Vital Signs:   Vitals:    04/30/24 0640   BP: 106/72   Pulse: 88   Resp:    Temp:    SpO2:        Notes/comments:     Patient status post [place] balloon, SVE as above, plan for continued Pitocin titration    Jory Freire DO 4/30/2024 6:41 AM

## 2024-04-30 NOTE — PLAN OF CARE
Problem: PAIN - ADULT  Goal: Verbalizes/displays adequate comfort level or baseline comfort level  Description: Interventions:  - Encourage patient to monitor pain and request assistance  - Assess pain using appropriate pain scale  - Administer analgesics based on type and severity of pain and evaluate response  - Implement non-pharmacological measures as appropriate and evaluate response  - Consider cultural and social influences on pain and pain management  - Notify physician/advanced practitioner if interventions unsuccessful or patient reports new pain  Outcome: Progressing     Problem: INFECTION - ADULT  Goal: Absence or prevention of progression during hospitalization  Description: INTERVENTIONS:  - Assess and monitor for signs and symptoms of infection  - Monitor lab/diagnostic results  - Monitor all insertion sites, i.e. indwelling lines, tubes, and drains  - Monitor endotracheal if appropriate and nasal secretions for changes in amount and color  - Keavy appropriate cooling/warming therapies per order  - Administer medications as ordered  - Instruct and encourage patient and family to use good hand hygiene technique  - Identify and instruct in appropriate isolation precautions for identified infection/condition  Outcome: Progressing  Goal: Absence of fever/infection during neutropenic period  Description: INTERVENTIONS:  - Monitor WBC    Outcome: Progressing     Problem: SAFETY ADULT  Goal: Patient will remain free of falls  Description: INTERVENTIONS:  - Educate patient/family on patient safety including physical limitations  - Instruct patient to call for assistance with activity   - Consult OT/PT to assist with strengthening/mobility   - Keep Call bell within reach  - Keep bed low and locked with side rails adjusted as appropriate  - Keep care items and personal belongings within reach  - Initiate and maintain comfort rounds  - Make Fall Risk Sign visible to staff  - Apply yellow socks and bracelet  for high fall risk patients  - Consider moving patient to room near nurses station  Outcome: Progressing  Goal: Maintain or return to baseline ADL function  Description: INTERVENTIONS:  -  Assess patient's ability to carry out ADLs; assess patient's baseline for ADL function and identify physical deficits which impact ability to perform ADLs (bathing, care of mouth/teeth, toileting, grooming, dressing, etc.)  - Assess/evaluate cause of self-care deficits   - Assess range of motion  - Assess patient's mobility; develop plan if impaired  - Assess patient's need for assistive devices and provide as appropriate  - Encourage maximum independence but intervene and supervise when necessary  - Involve family in performance of ADLs  - Assess for home care needs following discharge   - Consider OT consult to assist with ADL evaluation and planning for discharge  - Provide patient education as appropriate  Outcome: Progressing  Goal: Maintains/Returns to pre admission functional level  Description: INTERVENTIONS:  - Perform AM-PAC 6 Click Basic Mobility/ Daily Activity assessment daily.  - Set and communicate daily mobility goal to care team and patient/family/caregiver.   - Collaborate with rehabilitation services on mobility goals if consulted  - Out of bed for toileting  - Record patient progress and toleration of activity level   Outcome: Progressing     Problem: Knowledge Deficit  Goal: Patient/family/caregiver demonstrates understanding of disease process, treatment plan, medications, and discharge instructions  Description: Complete learning assessment and assess knowledge base.  Interventions:  - Provide teaching at level of understanding  - Provide teaching via preferred learning methods  Outcome: Progressing  Goal: Verbalizes understanding of labor plan  Description: Assess patient/family/caregiver's baseline knowledge level and ability to understand information.  Provide education via patient/family/caregiver's  preferred learning method at appropriate level of understanding.     1. Provide teaching at level of understanding.  2. Provide teaching via preferred learning method(s).  Outcome: Progressing     Problem: DISCHARGE PLANNING  Goal: Discharge to home or other facility with appropriate resources  Description: INTERVENTIONS:  - Identify barriers to discharge w/patient and caregiver  - Arrange for needed discharge resources and transportation as appropriate  - Identify discharge learning needs (meds, wound care, etc.)  - Arrange for interpretive services to assist at discharge as needed  - Refer to Case Management Department for coordinating discharge planning if the patient needs post-hospital services based on physician/advanced practitioner order or complex needs related to functional status, cognitive ability, or social support system  Outcome: Progressing     Problem: BIRTH - VAGINAL/ SECTION  Goal: Fetal and maternal status remain reassuring during the birth process  Description: INTERVENTIONS:  - Monitor vital signs  - Monitor fetal heart rate  - Monitor uterine activity  - Monitor labor progression (vaginal delivery)  - DVT prophylaxis  - Antibiotic prophylaxis  Outcome: Progressing  Goal: Emotionally satisfying birthing experience for mother/fetus  Description: Interventions:  - Assess, plan, implement and evaluate the nursing care given to the patient in labor  - Advocate the philosophy that each childbirth experience is a unique experience and support the family's chosen level of involvement and control during the labor process   - Actively participate in both the patient's and family's teaching of the birth process  - Consider cultural, Druze and age-specific factors and plan care for the patient in labor  Outcome: Progressing     Problem: Labor & Delivery  Goal: Manages discomfort  Description: Assess and monitor for signs and symptoms of discomfort.  Assess patient's pain level regularly and  per hospital policy.  Administer medications as ordered. Support use of nonpharmacological methods to help control pain such as distraction, imagery, relaxation, and application of heat and cold.  Collaborate with interdisciplinary team and patient to determine appropriate pain management plan.    1. Include patient in decisions related to comfort.  2. Offer non-pharmacological pain management interventions.  3. Report ineffective pain management to physician.  Outcome: Progressing  Goal: Patient vital signs are stable  Description: 1. Assess vital signs - vaginal delivery.  Outcome: Progressing

## 2024-04-30 NOTE — OB LABOR/OXYTOCIN SAFETY PROGRESS
Oxytocin Safety Progress Check Note - Nessa Calabrese 29 y.o. female MRN: [id number]    Unit/Bed#: -01 Encounter: [id number]    Dose (usman-units/min) Oxytocin: 6 usman-units/min  Contraction Frequency (minutes): 2-3  Contraction Intensity: Mild  Uterine Activity Characteristics: Irregular  Cervical Dilation: 3        Cervical Effacement: 70  Fetal Station: -3  Baseline Rate (FHR): 145 bpm  Fetal Heart Rate (FHT): 140 BPM  FHR Category: I               Vital Signs:   Vitals:    04/30/24 0830   BP: 131/68   Pulse: 91   Resp:    Temp:    SpO2:        Notes/comments:   Patient comfortable at this time. FHT category I. SVE deferred at this time.       Khushi Hernandez MD 4/30/2024 9:33 AM

## 2024-04-30 NOTE — ANESTHESIA PREPROCEDURE EVALUATION
Procedure:  LABOR ANALGESIA    Relevant Problems   GYN   (+) 40 weeks gestation of pregnancy   (+) Supervision of normal pregnancy      HEMATOLOGY   (+) Maternal anemia in pregnancy, antepartum        Physical Exam    Airway    Mallampati score: I         Dental       Cardiovascular      Pulmonary      Other Findings  post-pubertal.      Anesthesia Plan  ASA Score- 2     Anesthesia Type- general with ASA Monitors.         Additional Monitors:     Airway Plan:            Plan Factors-Exercise tolerance (METS): >4 METS.    Chart reviewed. EKG reviewed. Imaging results reviewed. Existing labs reviewed. Patient summary reviewed.    Patient is not a current smoker.  Patient did not smoke on day of surgery.    Obstructive sleep apnea risk education given perioperatively.        Induction- intravenous.    Postoperative Plan- Plan for postoperative opioid use. Planned trial extubation    Informed Consent- Anesthetic plan and risks discussed with patient.  I personally reviewed this patient with the CRNA. Discussed and agreed on the Anesthesia Plan with the CRNA..

## 2024-04-30 NOTE — OB LABOR/OXYTOCIN SAFETY PROGRESS
Oxytocin Safety Progress Check Note - Nessa Calabrese 29 y.o. female MRN: [id number]    Unit/Bed#: -01 Encounter: [id number]    Dose (usman-units/min) Oxytocin: 16 usman-units/min -> off  Contraction Frequency (minutes): 1.5-2.5  Contraction Intensity: Strong  Uterine Activity Characteristics: Irregular  Cervical Dilation: 9        Cervical Effacement: 90  Fetal Station: -1  Baseline Rate (FHR): 155 bpm  Fetal Heart Rate (FHT): 144 BPM  FHR Category: 2               Vital Signs:   Vitals:    04/30/24 1903   BP:    Pulse:    Resp:    Temp: 98.4 °F (36.9 °C)   SpO2:        Notes/comments:   FHT with 4 min decel with a kimmy in the 80s, SVE with progress to 9 cm, pitocin held, repositioned with improvement back to baseline      Lela Meza MD 4/30/2024 7:18 PM

## 2024-04-30 NOTE — OB LABOR/OXYTOCIN SAFETY PROGRESS
Labor Progress Note - Nessa Calabrese 29 y.o. female MRN: [id number]    Unit/Bed#: -01 Encounter: [id number]                Cervical Dilation: Closed        Cervical Effacement: 50  Fetal Station: -3        FHR Category: cat 1               Vital Signs:   Vitals:    04/29/24 2035   Resp: 18   Temp: 98.3 °F (36.8 °C)   SpO2: 98%       Notes/comments:     FB inserted. Patient noted to rupture with balloon insertion. Discussed with attending provider, balloon left in place.     Jory Freire DO 4/29/2024 10:49 PM

## 2024-05-01 PROCEDURE — 99024 POSTOP FOLLOW-UP VISIT: CPT | Performed by: OBSTETRICS & GYNECOLOGY

## 2024-05-01 RX ADMIN — Medication 1 TABLET: at 09:19

## 2024-05-01 RX ADMIN — HYDROCORTISONE 1 APPLICATION: 1 CREAM TOPICAL at 00:24

## 2024-05-01 RX ADMIN — IBUPROFEN 600 MG: 600 TABLET, FILM COATED ORAL at 00:25

## 2024-05-01 RX ADMIN — BENZOCAINE AND LEVOMENTHOL 1 APPLICATION: 200; 5 SPRAY TOPICAL at 00:24

## 2024-05-01 RX ADMIN — ACETAMINOPHEN 650 MG: 325 TABLET, FILM COATED ORAL at 22:55

## 2024-05-01 RX ADMIN — FERROUS SULFATE TAB 325 MG (65 MG ELEMENTAL FE) 325 MG: 325 (65 FE) TAB at 07:37

## 2024-05-01 RX ADMIN — ACETAMINOPHEN 650 MG: 325 TABLET, FILM COATED ORAL at 01:54

## 2024-05-01 RX ADMIN — ACETAMINOPHEN 650 MG: 325 TABLET, FILM COATED ORAL at 07:40

## 2024-05-01 RX ADMIN — IBUPROFEN 600 MG: 600 TABLET, FILM COATED ORAL at 17:48

## 2024-05-01 RX ADMIN — IBUPROFEN 600 MG: 600 TABLET, FILM COATED ORAL at 11:47

## 2024-05-01 RX ADMIN — WITCH HAZEL 1 PAD: 500 SOLUTION RECTAL; TOPICAL at 00:24

## 2024-05-01 NOTE — LACTATION NOTE
This note was copied from a baby's chart.  CONSULT - LACTATION  Baby Girl Calabrese (Heather) 1 days female MRN: [id number]    [facility] NURSERY Room / Bed: (N)/(N) Encounter: [id number]    Maternal Information     MOTHER:  Nessa Calabrese  Maternal Age: 29 y.o.   OB History: # 1 - Date: 22, Sex: Male, Weight: 3120 g (6 lb 14.1 oz), GA: 39w5d, Delivery: , Low Transverse, Apgar1: 8, Apgar5: 9, Living: Living, Birth Comments: None    # 2 - Date: 24, Sex: Female, Weight: 3402 g (7 lb 8 oz), GA: 40w5d, Delivery: Vaginal, Spontaneous, Apgar1: 6, Apgar5: 8, Living: Living, Birth Comments: None   Previouse breast reduction surgery? No    Lactation history:   Has patient previously breast fed: Yes   How long had patient previously breast fed: 18mo   Previous breast feeding complications: None     Past Surgical History:   Procedure Laterality Date    CARDIAC SURGERY      age 1    [place]LECYSTECTOMY  2019    [place]  DELIV[place] ONLY N/A 2022    Procedure:  SECTION ();  Surgeon: Arlin Gracia MD;  Location: [facility] ;  Service: Obstetrics    [place] EXTERNAL CEPHALIC VERSION W/WO TOCOLYSIS N/A 2022    Procedure: VERSION EXTERNAL CEPHALIC;  Surgeon: Arlin Gracia MD;  Location: [facility] ;  Service: Obstetrics        Birth information:  YOB: 2025   Time of birth: 10:04 PM   Sex: female   Delivery type: Vaginal, Spontaneous   Birth Weight: 3402 g (7 lb 8 oz)   Percent of Weight Change: 0%     Gestational Age: 40w5d   [unfilled]    Assessment     Breast and nipple assessment:  round breasts with dark areolas and nipples that gama with stimulation. Stretchy skin noted.     Assessment:  no clinical assessment; early feeding cues noted    Feeding assessment: feeding well with demonstration and teach back  LATCH:  Latch: Grasps breast, tongue down, lips flanged, rhythmic sucking   Audible Swallowing: Spontaneous and intermittent (24  hours old)   Type of Nipple: Everted (After stimulation)   Comfort (Breast/Nipple): Soft/non-tender   Hold (Positioning): Partial assist, teach one side, mother does other, staff holds   LATCH Score: 9          Feeding recommendations:  breast feed on demand. Mom is an exp. Bresatfeeding mom that breast fed her first baby for 18 months.    Mom states baby has been sleepy after birth. Mom has done some HE into a cup to feed to baby overnight.     Demonstration and teach back of HE. Demonstration of lifting the breast and baby to see the latch. Demonstration and teach back of snug hold of the baby and bring the baby to the breast, not breast to baby.  Baby latched in cross cradle. Baby begins the feeding latched deeply. After a few min. Mom loses the latch due to losing her snug hold on the baby. Demonstration and teach back of modified koala hold on the R breast. Deeper latch with compression between the baby's shoulder blades. Mom feels like she has more control of baby in this position. Deep latch achieved with active, coordinated sucks.    RSB/DC reviewed    Enc. To offer both breasts at every feeding.    Mom  has s2 at home    Education on positioning and alignment. Mom is encouraged to:     - Bring baby up to the breast (use of pillows to elevate so baby's torso is against mom's breasts)   - Skin to skin for feedings with top hand exposed to show signs of satiation   - Chin deep into breast tissue (make baby look up to the nipple)   - nose aligned to the nipple   -Wait for wide gape, drag chin on the breast so nipple is aimed at the upper, back palate  - Cheek should be touching breast   - Deep, firm hold of baby with ear, shoulder, hip alignment    Spectra Education on turning on the pump, press the 3 wavy lines to place pump on stimulation mode (high cycle, low vacuum) Set vacuum to comfort with light suction. After 3 min, press 3 wavy lines and change setting to Expression mode (low Cycle, High vacuum) Vacuum  setting should not pinch, only tug the nipple. Now pump is set. Next time mom pumps, will only need to turn on pump and press 3 wavy line button to change cycle three times in a pumping session.     Information on hand expression given. Discussed benefits of knowing how to manually express breast including stimulating milk supply, softening nipple for latch and evacuating breast in the event of engorgement.    Mom is encouraged to place baby skin to skin for feedings. Skin to skin education provided for baby placement on mother's chest, baby only in diaper, blankets below shoulders on baby's back. Skin to skin is encouraged to continue at home for feedings and between feedings.    Worked on positioning infant up at chest level and starting to feed infant with nose arriving at the nipple. Then, using areolar compression to achieve a deep latch that is comfortable and exchanges optimum amounts of milk.     - Start feedings on breast that last feeding ended   - allow no more than 3 hours between breast feeding sessions   - time between feedings is counted from the beginning of the first feed to the beginning of the next feeding session    Reviewed early signs of hunger, including tensing of hands and shoulders - no need to wait for open eyes.  Crying is a late hunger sign.  If baby is crying, soothe baby first and then attempt to latch.  Reviewed normal sucking patterns: transition from stimulation to nutritive to release or non-nutritive. The goal is to see and hear lots of swallowing.    Reviewed normal nursing pattern: infant could latch on one breast up to 30 minutes or until releases on own. Signs of satiation is open hand with fingers that do not grab your finger.  Discussed difference in sensation of non-nutritive v nutritive sucking    Met with mother. Provided mother with Ready, Set, Baby booklet.    Discussed Skin to Skin contact an benefits to mom and baby.  Talked about the delay of the first bath until  baby has adjusted. Spoke about the benefits of rooming in. Feeding on cue and what that means for recognizing infant's hunger. Avoidance of pacifiers for the first month discussed. Talked about exclusive breastfeeding for the first 6 months.    Positioning and latch reviewed as well as showing images of other feeding positions.  Discussed the properties of a good latch in any position. Reviewed hand/manual expression.  Discussed s/s that baby is getting enough milk and some s/s that breastfeeding dyad may need further help.    Gave information on common concerns, what to expect the first few weeks after delivery, preparing for other caregivers, and how partners can help. Resources for support also provided.    Encouraged parents to call for assistance, questions, and concerns about breastfeeding.  Extension provided.    Provided education on growth spurts, when to introduce bottles; paced bottle feeding, and non-nutritive suck at the breast. Provided education on Signs of satiation. Encouraged to call lactation to observe a latch prior to discharge for reassurance. Encouraged to call baby and me with any questions and closely monitor output.      Christy Hume, MA 5/1/2024 9:49 AM

## 2024-05-01 NOTE — PLAN OF CARE
Problem: PAIN - ADULT  Goal: Verbalizes/displays adequate comfort level or baseline comfort level  Description: Interventions:  - Encourage patient to monitor pain and request assistance  - Assess pain using appropriate pain scale  - Administer analgesics based on type and severity of pain and evaluate response  - Implement non-pharmacological measures as appropriate and evaluate response  - Consider cultural and social influences on pain and pain management  - Notify physician/advanced practitioner if interventions unsuccessful or patient reports new pain  Outcome: Progressing     Problem: INFECTION - ADULT  Goal: Absence or prevention of progression during hospitalization  Description: INTERVENTIONS:  - Assess and monitor for signs and symptoms of infection  - Monitor lab/diagnostic results  - Monitor all insertion sites, i.e. indwelling lines, tubes, and drains  - Monitor endotracheal if appropriate and nasal secretions for changes in amount and color  - Evans appropriate cooling/warming therapies per order  - Administer medications as ordered  - Instruct and encourage patient and family to use good hand hygiene technique  - Identify and instruct in appropriate isolation precautions for identified infection/condition  Outcome: Progressing  Goal: Absence of fever/infection during neutropenic period  Description: INTERVENTIONS:  - Monitor WBC    Outcome: Progressing     Problem: SAFETY ADULT  Goal: Patient will remain free of falls  Description: INTERVENTIONS:  - Educate patient/family on patient safety including physical limitations  - Instruct patient to call for assistance with activity   - Consult OT/PT to assist with strengthening/mobility   - Keep Call bell within reach  - Keep bed low and locked with side rails adjusted as appropriate  - Keep care items and personal belongings within reach  - Initiate and maintain comfort rounds  - Make Fall Risk Sign visible to staff  - Offer Toileting every PRN  Hours, in advance of need  - Initiate/Maintain PRN alarm  - Obtain necessary fall risk management equipment: PRN  - Apply yellow socks and bracelet for high fall risk patients  - Consider moving patient to room near nurses station  Outcome: Progressing  Goal: Maintain or return to baseline ADL function  Description: INTERVENTIONS:  -  Assess patient's ability to carry out ADLs; assess patient's baseline for ADL function and identify physical deficits which impact ability to perform ADLs (bathing, care of mouth/teeth, toileting, grooming, dressing, etc.)  - Assess/evaluate cause of self-care deficits   - Assess range of motion  - Assess patient's mobility; develop plan if impaired  - Assess patient's need for assistive devices and provide as appropriate  - Encourage maximum independence but intervene and supervise when necessary  - Involve family in performance of ADLs  - Assess for home care needs following discharge   - Consider OT consult to assist with ADL evaluation and planning for discharge  - Provide patient education as appropriate  Outcome: Progressing  Goal: Maintains/Returns to pre admission functional level  Description: INTERVENTIONS:  - Perform AM-PAC 6 Click Basic Mobility/ Daily Activity assessment daily.  - Set and communicate daily mobility goal to care team and patient/family/caregiver.   - Collaborate with rehabilitation services on mobility goals if consulted  - Perform Range of Motion PRN times a day.  - Reposition patient every PRN hours.  - Dangle patient PRN times a day  - Stand patient PRN times a day  - Ambulate patient PRN times a day  - Out of bed to chair PRN times a day   - Out of bed for meals PRN times a day  - Out of bed for toileting  - Record patient progress and toleration of activity level   Outcome: Progressing     Problem: DISCHARGE PLANNING  Goal: Discharge to home or other facility with appropriate resources  Description: INTERVENTIONS:  - Identify barriers to discharge  w/patient and caregiver  - Arrange for needed discharge resources and transportation as appropriate  - Identify discharge learning needs (meds, wound care, etc.)  - Arrange for interpretive services to assist at discharge as needed  - Refer to Case Management Department for coordinating discharge planning if the patient needs post-hospital services based on physician/advanced practitioner order or complex needs related to functional status, cognitive ability, or social support system  Outcome: Progressing     Problem: POSTPARTUM  Goal: Experiences normal postpartum course  Description: INTERVENTIONS:  - Monitor maternal vital signs  - Assess uterine involution and lochia  Outcome: Progressing  Goal: Appropriate maternal -  bonding  Description: INTERVENTIONS:  - Identify family support  - Assess for appropriate maternal/infant bonding   -Encourage maternal/infant bonding opportunities  - Referral to  or  as needed  Outcome: Progressing  Goal: Establishment of infant feeding pattern  Description: INTERVENTIONS:  - Assess breast/bottle feeding  - Refer to lactation as needed  Outcome: Progressing  Goal: Incision(s), wounds(s) or drain site(s) healing without S/S of infection  Description: INTERVENTIONS  - Assess and document dressing, incision, wound bed, drain sites and surrounding tissue  - Provide patient and family education  - Perform skin care/dressing changes every PRN  Outcome: Progressing

## 2024-05-01 NOTE — PLAN OF CARE
Problem: PAIN - ADULT  Goal: Verbalizes/displays adequate comfort level or baseline comfort level  Description: Interventions:  - Encourage patient to monitor pain and request assistance  - Assess pain using appropriate pain scale  - Administer analgesics based on type and severity of pain and evaluate response  - Implement non-pharmacological measures as appropriate and evaluate response  - Consider cultural and social influences on pain and pain management  - Notify physician/advanced practitioner if interventions unsuccessful or patient reports new pain  Outcome: Progressing     Problem: INFECTION - ADULT  Goal: Absence or prevention of progression during hospitalization  Description: INTERVENTIONS:  - Assess and monitor for signs and symptoms of infection  - Monitor lab/diagnostic results  - Monitor all insertion sites, i.e. indwelling lines, tubes, and drains  - Monitor endotracheal if appropriate and nasal secretions for changes in amount and color  - [place] appropriate cooling/warming therapies per order  - Administer medications as ordered  - Instruct and encourage patient and family to use good hand hygiene technique  - Identify and instruct in appropriate isolation precautions for identified infection/condition  Outcome: Progressing  Goal: Absence of fever/infection during neutropenic period  Description: INTERVENTIONS:  - Monitor WBC    Outcome: Progressing     Problem: SAFETY ADULT  Goal: Patient will remain free of falls  Description: INTERVENTIONS:  - Educate patient/family on patient safety including physical limitations  - Instruct patient to call for assistance with activity   - Consult OT/PT to assist with strengthening/mobility   - Keep Call bell within reach  - Keep bed low and locked with side rails adjusted as appropriate  - Keep care items and personal belongings within reach  - Initiate and maintain comfort rounds  - Make Fall Risk Sign visible to staff  - Offer Toileting every    Hours,  in advance of need  - Initiate/Maintain   alarm  - Obtain necessary fall risk management equipment:     - Apply yellow socks and bracelet for high fall risk patients  - Consider moving patient to room near nurses station  Outcome: Progressing  Goal: Maintain or return to baseline ADL function  Description: INTERVENTIONS:  -  Assess patient's ability to carry out ADLs; assess patient's baseline for ADL function and identify physical deficits which impact ability to perform ADLs (bathing, care of mouth/teeth, toileting, grooming, dressing, etc.)  - Assess/evaluate cause of self-care deficits   - Assess range of motion  - Assess patient's mobility; develop plan if impaired  - Assess patient's need for assistive devices and provide as appropriate  - Encourage maximum independence but intervene and supervise when necessary  - Involve family in performance of ADLs  - Assess for home care needs following discharge   - Consider OT consult to assist with ADL evaluation and planning for discharge  - Provide patient education as appropriate  Outcome: Progressing  Goal: Maintains/Returns to pre admission functional level  Description: INTERVENTIONS:  - Perform AM-PAC 6 Click Basic Mobility/ Daily Activity assessment daily.  - Set and communicate daily mobility goal to care team and patient/family/caregiver.   - Collaborate with rehabilitation services on mobility goals if consulted  - Perform Range of Motion    times a day.  - Reposition patient every    hours.  - Dangle patient    times a day  - Stand patient    times a day  - Ambulate patient    times a day  - Out of bed to chair    times a day   - Out of bed for meals    times a day  - Out of bed for toileting  - Record patient progress and toleration of activity level   Outcome: Progressing     Problem: DISCHARGE PLANNING  Goal: Discharge to home or other facility with appropriate resources  Description: INTERVENTIONS:  - Identify barriers to discharge w/patient and  caregiver  - Arrange for needed discharge resources and transportation as appropriate  - Identify discharge learning needs (meds, wound care, etc.)  - Arrange for interpretive services to assist at discharge as needed  - Refer to Case Management Department for coordinating discharge planning if the patient needs post-hospital services based on physician/advanced practitioner order or complex needs related to functional status, cognitive ability, or social support system  Outcome: Progressing     Problem: POSTPARTUM  Goal: Experiences normal postpartum course  Description: INTERVENTIONS:  - Monitor maternal vital signs  - Assess uterine involution and lochia  Outcome: Progressing  Goal: Appropriate maternal -  bonding  Description: INTERVENTIONS:  - Identify family support  - Assess for appropriate maternal/infant bonding   -Encourage maternal/infant bonding opportunities  - Referral to  or  as needed  Outcome: Progressing  Goal: Establishment of infant feeding pattern  Description: INTERVENTIONS:  - Assess breast/bottle feeding  - Refer to lactation as needed  Outcome: Progressing  Goal: Incision(s), wounds(s) or drain site(s) healing without S/S of infection  Description: INTERVENTIONS  - Assess and document dressing, incision, wound bed, drain sites and surrounding tissue  - Provide patient and family education  - Perform skin care/dressing changes every         Outcome: Progressing     Problem: Knowledge Deficit  Goal: Patient/family/caregiver demonstrates understanding of disease process, treatment plan, medications, and discharge instructions  Description: Complete learning assessment and assess knowledge base.  Interventions:  - Provide teaching at level of understanding  - Provide teaching via preferred learning methods  Outcome: Completed  Goal: Verbalizes understanding of labor plan  Description: Assess patient/family/caregiver's baseline knowledge level and ability to  understand information.  Provide education via patient/family/caregiver's preferred learning method at appropriate level of understanding.     1. Provide teaching at level of understanding.  2. Provide teaching via preferred learning method(s).  Outcome: Completed     Problem: BIRTH - VAGINAL/ SECTION  Goal: Fetal and maternal status remain reassuring during the birth process  Description: INTERVENTIONS:  - Monitor vital signs  - Monitor fetal heart rate  - Monitor uterine activity  - Monitor labor progression (vaginal delivery)  - DVT prophylaxis  - Antibiotic prophylaxis  Outcome: Completed  Goal: Emotionally satisfying birthing experience for mother/fetus  Description: Interventions:  - Assess, plan, implement and evaluate the nursing care given to the patient in labor  - Advocate the philosophy that each childbirth experience is a unique experience and support the family's chosen level of involvement and control during the labor process   - Actively participate in both the patient's and family's teaching of the birth process  - Consider cultural, Taoism and age-specific factors and plan care for the patient in labor  Outcome: Completed     Problem: Labor & Delivery  Goal: Manages discomfort  Description: Assess and monitor for signs and symptoms of discomfort.  Assess patient's pain level regularly and per hospital policy.  Administer medications as ordered. Support use of nonpharmacological methods to help control pain such as distraction, imagery, relaxation, and application of heat and cold.  Collaborate with interdisciplinary team and patient to determine appropriate pain management plan.    1. Include patient in decisions related to comfort.  2. Offer non-pharmacological pain management interventions.  3. Report ineffective pain management to physician.  Outcome: Completed  Goal: Patient vital signs are stable  Description: 1. Assess vital signs - vaginal delivery.  Outcome: Completed

## 2024-05-01 NOTE — OB LABOR/OXYTOCIN SAFETY PROGRESS
Oxytocin Safety Progress Check Note - Nessa Calabrese 29 y.o. female MRN: [id number]    Unit/Bed#: -01 Encounter: [id number]    Dose (usman-units/min) Oxytocin: 2 usman-units/min  Contraction Frequency (minutes): 1-3  Contraction Intensity: Strong  Uterine Activity Characteristics: Irregular  Cervical Dilation: 10  Dilation Complete Date: 04/30/24  Dilation Complete Time: 2102  Cervical Effacement: 100  Fetal Station: 1  Baseline Rate (FHR): 150 bpm  Fetal Heart Rate (FHT): 145 BPM  FHR Category: 2               Vital Signs:    Vitals:    04/30/24 2037   BP: 111/79   Pulse: 101   Resp:    Temp:    SpO2:        Notes/comments:   Went to room to evaluate patient secondary to prolonged deceleration noted after resumption of pitocin.  Pitocin turned off.  Exam as above.  Recovery of FHR to baseline with maternal repositioning.  Will allow FHR to recover and then begin pushing.  Patient informed of plan of care and all questions answered.      Gwendolyn Mendoza MD 4/30/2024 9:18 PM

## 2024-05-01 NOTE — DISCHARGE SUMMARY
Discharge Summary - Nessa Calabrese 29 y.o. female MRN: [id number]    Unit/Bed#: -01 Encounter: [id number]    Admission Date: 2024     Discharge Date: ***    Patient Active Problem List   Diagnosis    History of congenital heart defect    Family history of breast cancer    History of anemia    Vaginal birth after  ()    Supervision of normal pregnancy    History of  delivery, antepartum    Family history of congenital or genetic condition    Maternal anemia in pregnancy, antepartum       OBGYN Practice: [facility] Course:   Nessa Calabrese is a 29 y.o. [id number] 40w5d . She presented to labor and delivery for elective induction of labor.  Her pregnancy was complicated by prior , history of anemia.  A [place] balloon was placed and she was ruptured upon placement for clear fluid at 2237.  The [place] balloon came out and she was started on Pitocin. She received her epidural, and she was noted to be 9 cm shortly after with a 4-minute decel.  Pitocin was held and the tracing came back to baseline.  Patient was noted to have 1 axillary temperature of 102.5 and fetal tachycardia was noted.  Patient received 2 doses of Unasyn for presumed III. Pitocin was restarted after giving time to recover and there was a another prolonged deceleration.  At this point the patient was noted to be complete, Pitocin was held and she began pushing.     Delivery Findings:  Nessa delivered a viable female  on 2024  10:04 PM  via   . The delivery was uncomplicated***    Baby's Weight:  ;      Apgar scores:   and   at 1 and 5 minutes, respectively  Anesthesia:  ,   QBL:           was transferred to  nursery. Patient tolerated the procedure well and was transferred to recovery in stable condition.     Her post-partum course was uncomplicated***.  Her post-partum pain was well controlled with oral analgesics. On day of discharge she was ambulating, voiding  spontaneously, tolerating oral intake and hemodynamically stable. Mom's blood type is O positive and  Rhogam was not given.    She was discharged home on postpartum day #*** without complications. Patient was instructed to follow up with her OB as an outpatient and was given appropriate warnings to call doctor or provider if she develops signs of infection or uncontrolled pain.    Discharge Problem List by Issue:   Maternal anemia in pregnancy, antepartum  Assessment & Plan  Hgb 10.5 (3/2024), pending admission CBC, taking PO iron.     History of  delivery, antepartum  Assessment & Plan  Mother would like TOLAC    History of congenital heart defect  Assessment & Plan  Normal fetal echo 2023 per [facility]    * Vaginal birth after  ()  Assessment & Plan  Admit to OBGYN   Clear liquid diet   F/u T&S, CBC, RPR   IVF LR 125cc/hr   Continuous fetal monitoring and tocometry   Analgesia at maternal request   Vertex by TAUS  Induction plan: FB           Disposition: Home    Planned Readmission: No    Discharge Medications:   Please see AVS    Discharge instructions :   -Do not place anything (no partner, tampons or douche) in your vagina for 6 weeks.  -You may walk for exercise for the first 6 weeks then gradually return to your usual activities.   -Please do not drive for 1 week if you have no stitches and for 2 weeks if you have stitches or underwent a  delivery.    -You may take baths or shower per your preference.   -Please look at your bust (breasts) in the mirror daily and call your doctor for redness or tenderness or increased warmth.   - If you have had a  section please look at your incision daily as well and call provider for increasing redness or steady drainage from the incision.   -Please call your doctor's office if temperature > 100.4*F or 38* C, worsening pain or a foul discharge.    Follow Up:  - Follow up in *** weeks for postpartum visit    ***

## 2024-05-01 NOTE — L&D DELIVERY NOTE
Vaginal Delivery Summary - OB/GYN   Nessa Calabrese 29 y.o. female MRN: [id number]  Unit/Bed#: -01 Encounter: [id number]    Pre-delivery Diagnosis:   Pregnancy at 40 weeks 5 days  Prior   Anemia      Post-delivery Diagnosis: same, delivered    Procedure:     Attending Physician: Dr. Mendoza  Resident Physician: Dr. Jory Freier PGY-1      Anesthesia: Epidural    QBL: Pending at time of dictation    Complications: none apparent    Specimens:   1. Arterial and venous cord gases  2. Cord blood  3. Segment of umbilical cord  4. Placenta to pathology for suspected III    Findings:  1. Viable female on 2024 at 2204, with APGARS of 6 and 8 at 1 and 5 minutes respectively. Weight pending at time of dictation for skin to skin bonding.  2. Spontaneous delivery of intact placenta at 2208  3.  Left vaginal and right vaginal laceration repaired with 3-0 Vicryl Rapide    Gases:  Umbilical Artery  Recent Labs     24  2210   PHCART 7.134*   BECART -11.2*       Umbilical Vein  Recent Labs     24  2210   PHCVEN 7.245   BECVEN -8.4*           Brief history and labor course:  Nessa Calabrese is a 29 y.o. [id number] 40w5d . She presented to labor and delivery for elective induction of labor.  Her pregnancy was complicated by prior , history of anemia.  A [place] balloon was placed and she was ruptured upon placement for clear fluid at 2237.  The [place] balloon came out and she was started on Pitocin. She received her epidural, and she was noted to be 9 cm shortly after with a 4-minute decel.  Pitocin was held and the tracing came back to baseline.  Patient was noted to have 1 axillary temperature of 102.5 and fetal tachycardia was noted.  Patient received 2 doses of Unasyn for presumed III. Pitocin was restarted after giving time to recover and there was a another prolonged deceleration.  At this point the patient was noted to be complete, Pitocin was held and she began pushing.    Description of  delivery:  After pushing for 23 minutes, patient delivered a viable female , wt pending as mother is doing skin to skin bonding. The fetal vertex delivered ALISSON position spontaneously. There was no nuchal cord. The left anterior shoulder delivered atraumatically with maternal expulsive forces and the assistance of gentle downward traction. The right posterior shoulder delivered with maternal expulsive forces and the assistance of gentle upward traction. The remainder of the fetus delivered spontaneously.        Upon delivery, the infant was placed on the mothers abdomen and the cord was doubly clamped and cut. Delayed cord clamping was achieved. The infant was noted to cry spontaneously and was moving all extremities appropriately. There was no evidence for injury. Awaiting nurse resuscitators evaluated the . Arterial and venous cord blood gases and cord blood was collected for analysis. These were promptly sent to the lab. In the immediate post-partum, active management of the 3rd stage of labor was performed with massage, the administration of 30 units of IV pitocin, and gentle traction on the umbilical cord. The placenta delivered spontaneously and was noted to have a centrally inserted 3 vessel cord.  The placenta was sent to pathology.     Laceration Repair  The vagina, cervix, perineum, and rectum were inspected and there was noted to be a left vaginal laceration and a right vaginal laceration.  The left vaginal laceration was repaired in a running locked fashion using 3-0 Vicryl Rapide.  The right vaginal laceration was repaired with a figure-of-eight stitch using the same suture.    At the conclusion of the procedure, all needle, sponge, and instrument counts were noted to be correct. Dr. Mendoza was present and participated in all key portions of the case.    Disposition:  The patient and the  both tolerated the procedure well and are recovering in labor and delivery room       [place]  Ramona Freire,   OB/GYN PGY-1  4/30/2024  10:29 PM

## 2024-05-01 NOTE — PROGRESS NOTES
Progress Note - OB/GYN  Nessa Calabrese 29 y.o. female MRN: [id number]  Unit/Bed#: -01 Encounter: [id number]    Assessment and Plan     Nessa Calabrese is a patient of: [facility]. She is PPD# 1 s/p  vaginal birth after  ()  Recovering well and is stable       Maternal anemia in pregnancy, antepartum  Assessment & Plan  Hgb 10.2 on admission   Blood loss 492 mL    History of congenital heart defect  Assessment & Plan  Normal fetal echo 2023 per [facility]    * Vaginal birth after  ()  Assessment & Plan  Lochia WNL   Recovering well   Appropriate bowel and bladder function   Pain well controlled   Tolerating diet   Ambulating without issues   No lower extremity tenderness  GBS neg   Rh pos            Disposition    - Anticipate discharge home on PPD# 2      Subjective/Objective     Chief Complaint: Postpartum State     Subjective:    Nessa Calabrese is PPD#1 s/p  vaginal birth after  (). She has no current complaints.  Pain is well controlled.  Patient is currently voiding.  She is ambulating.  Patient is currently passing flatus and has had no bowel movement. She is tolerating PO, and denies nausea or vomitting. Patient denies fever, chills, chest pain, shortness of breath, or calf tenderness. Lochia is normal. She is  Breastfeeding. She is recovering well and is stable.       Vitals:   /67   Pulse 87   Temp 98 °F (36.7 °C) (Oral)   Resp 18   Ht 5' (1.524 m)   Wt 74.8 kg (165 lb)   LMP 2023 (Exact Date)   SpO2 98%   Breastfeeding Yes   BMI 32.22 kg/m²       Intake/Output Summary (Last 24 hours) at 2024 0617  Last data filed at 2024 0154  Gross per 24 hour   Intake 800 ml   Output 1292 ml   Net -492 ml       Invasive Devices       Peripheral Intravenous Line  Duration             Peripheral IV 24 Distal;Dorsal (posterior);Left Forearm 1 day                    Physical Exam:   GEN: Nessa Calabrese appears well, alert and oriented x 3,  pleasant and cooperative   CARDIO: RRR, no murmurs or rubs  RESP:  CTAB, no wheezes or rales  ABDOMEN: soft, no tenderness, no distention, fundus @ U-4  EXTREMITIES: non tender, no erythema, b/l Cori's sign negative      Labs:     Hemoglobin   Date Value Ref Range Status   04/29/2024 10.2 (L) 11.5 - 15.4 g/dL Final   03/23/2024 10.5 (L) 11.5 - 15.4 g/dL Final     WBC   Date Value Ref Range Status   04/29/2024 6.90 4.31 - 10.16 Thousand/uL Final   03/23/2024 8.09 4.31 - 10.16 Thousand/uL Final     Platelets   Date Value Ref Range Status   04/29/2024 229 149 - 390 Thousands/uL Final   03/23/2024 288 149 - 390 Thousands/uL Final     Creatinine   Date Value Ref Range Status   06/10/2023 0.61 0.60 - 1.30 mg/dL Final     Comment:     Standardized to IDMS reference method     AST   Date Value Ref Range Status   06/10/2023 17 13 - 39 U/L Final     ALT   Date Value Ref Range Status   06/10/2023 13 7 - 52 U/L Final     Comment:     Specimen collection should occur prior to Sulfasalazine administration due to the potential for falsely depressed results.           Khushi Hernandez MD  5/1/2024  6:17 AM

## 2024-05-01 NOTE — ANESTHESIA POSTPROCEDURE EVALUATION
Post-Op Assessment Note    CV Status:  Stable  Pain Score: 0    Pain management: adequate      Post-op block assessment: no complications   Mental Status:  Alert and awake   Hydration Status:  Euvolemic   PONV Controlled:  Controlled   Airway Patency:  Patent     Post Op Vitals Reviewed: Yes    No anethesia notable event occurred.    Staff: CRNA   Comments: epidural catheter tip intact following removal            BP      Temp      Pulse     Resp      SpO2

## 2024-05-01 NOTE — DISCHARGE SUMMARY
Discharge Summary - OB/GYN  Nessa Calabrese 29 y.o. female MRN: [id number]  Unit/Bed#: -01 Encounter: [id number]    Admission Date: 2024     Discharge Date: 2024    Admitting Attending: Dr. Fletcher    Delivering & Discharging Attending: Dr. Mendoza    Admitting Diagnosis:   Pregnancy at 40w4d  Previous  delivery  Desire for TOLAC    Discharge Diagnosis:   Vaginal birth after  section  Chorioamnionitis    Procedures: spontaneous vaginal delivery    Anesthesia: epidural    Hospital course: Nessa Calabrese is a 29 y.o.  at 40w5d who was initially admitted for induction of labor/trial of labor after previous  section. She had a cerna balloon for cervical ripening with inadvertent AROM during placement. Induction was then started with pitocin. She received an epidural and ultimately progressed to complete cervical dilation and began pushing. She was diagnosed with chorioamnionitis during labor and treated with Unasyn.    She delivered a viable female  on 2024 at 2204. Weight 7lbs 8oz via normal spontaneous vaginal delivery. She sustained bilateral vaginal lacerations during delivery which were adequately repaired. Apgars were 6 (1 min) and 8 (5 min).  was transferred to  nursery. Patient tolerated the procedure well.     Her post-delivery course was uncomplicated. She remained afebrile in the postpartum period with no signs of endometritis. Her postpartum pain was well controlled with oral analgesics.    On day of discharge, she was ambulating and able to reasonably perform all ADLs. She was voiding and had appropriate bowel function. Pain was well controlled. She was discharged home on postpartum day #2 without complications. Patient was instructed to follow up with her OB as an outpatient and was given appropriate warnings to call provider if she develops signs of infection or uncontrolled pain.    Complications: none apparent    Condition at discharge:  stable     Discharge instructions/Information to patient and family:   See after visit summary for information provided to patient and family.      Provisions for Follow-Up Care:  See after visit summary for information related to follow-up care and any pertinent home health orders.      Disposition: See After Visit Summary for discharge disposition information.    Planned Readmission: No    Discharge medications and instructions:   Please see AVS for full list of medications upon discharge.    Sydnee Crenshaw MD  PGY-IV, OB/GYN  5/2/2024, 6:54 AM

## 2024-05-02 VITALS
OXYGEN SATURATION: 97 % | SYSTOLIC BLOOD PRESSURE: 126 MMHG | TEMPERATURE: 98 F | BODY MASS INDEX: 32.39 KG/M2 | HEIGHT: 60 IN | HEART RATE: 85 BPM | DIASTOLIC BLOOD PRESSURE: 63 MMHG | RESPIRATION RATE: 16 BRPM | WEIGHT: 165 LBS

## 2024-05-02 PROBLEM — Z87.74 HISTORY OF CONGENITAL HEART DEFECT: Status: RESOLVED | Noted: 2021-10-25 | Resolved: 2024-05-02

## 2024-05-02 PROBLEM — O41.1290 CHORIOAMNIONITIS: Status: ACTIVE | Noted: 2024-05-02

## 2024-05-02 PROCEDURE — NC001 PR NO CHARGE: Performed by: OBSTETRICS & GYNECOLOGY

## 2024-05-02 PROCEDURE — 99024 POSTOP FOLLOW-UP VISIT: CPT | Performed by: OBSTETRICS & GYNECOLOGY

## 2024-05-02 RX ORDER — DOCUSATE SODIUM 100 MG/1
100 CAPSULE, LIQUID FILLED ORAL 2 TIMES DAILY
Status: DISCONTINUED | OUTPATIENT
Start: 2024-05-02 | End: 2024-05-02 | Stop reason: HOSPADM

## 2024-05-02 RX ORDER — BENZOCAINE/MENTHOL 6 MG-10 MG
1 LOZENGE MUCOUS MEMBRANE DAILY PRN
Start: 2024-05-02

## 2024-05-02 RX ORDER — ACETAMINOPHEN 325 MG/1
650 TABLET ORAL EVERY 4 HOURS PRN
Qty: 30 TABLET | Refills: 0 | Status: CANCELLED | OUTPATIENT
Start: 2024-05-02

## 2024-05-02 RX ORDER — IBUPROFEN 600 MG/1
600 TABLET ORAL EVERY 6 HOURS
Qty: 30 TABLET | Refills: 0 | Status: CANCELLED | OUTPATIENT
Start: 2024-05-02

## 2024-05-02 RX ORDER — IBUPROFEN 600 MG/1
600 TABLET ORAL EVERY 6 HOURS
Start: 2024-05-02

## 2024-05-02 RX ORDER — DOCUSATE SODIUM 100 MG/1
100 CAPSULE, LIQUID FILLED ORAL 2 TIMES DAILY
Start: 2024-05-02

## 2024-05-02 RX ORDER — ACETAMINOPHEN 325 MG/1
650 TABLET ORAL EVERY 4 HOURS PRN
Start: 2024-05-02

## 2024-05-02 RX ADMIN — IBUPROFEN 600 MG: 600 TABLET, FILM COATED ORAL at 05:59

## 2024-05-02 RX ADMIN — DOCUSATE SODIUM 100 MG: 100 CAPSULE, LIQUID FILLED ORAL at 10:45

## 2024-05-02 NOTE — PLAN OF CARE
Problem: PAIN - ADULT  Goal: Verbalizes/displays adequate comfort level or baseline comfort level  Description: Interventions:  - Encourage patient to monitor pain and request assistance  - Assess pain using appropriate pain scale  - Administer analgesics based on type and severity of pain and evaluate response  - Implement non-pharmacological measures as appropriate and evaluate response  - Consider cultural and social influences on pain and pain management  - Notify physician/advanced practitioner if interventions unsuccessful or patient reports new pain  Outcome: Progressing     Problem: INFECTION - ADULT  Goal: Absence or prevention of progression during hospitalization  Description: INTERVENTIONS:  - Assess and monitor for signs and symptoms of infection  - Monitor lab/diagnostic results  - Monitor all insertion sites, i.e. indwelling lines, tubes, and drains  - Monitor endotracheal if appropriate and nasal secretions for changes in amount and color  - [place] appropriate cooling/warming therapies per order  - Administer medications as ordered  - Instruct and encourage patient and family to use good hand hygiene technique  - Identify and instruct in appropriate isolation precautions for identified infection/condition  Outcome: Progressing  Goal: Absence of fever/infection during neutropenic period  Description: INTERVENTIONS:  - Monitor WBC    Outcome: Progressing     Problem: SAFETY ADULT  Goal: Patient will remain free of falls  Description: INTERVENTIONS:  - Educate patient/family on patient safety including physical limitations  - Instruct patient to call for assistance with activity   - Consult OT/PT to assist with strengthening/mobility   - Keep Call bell within reach  - Keep bed low and locked with side rails adjusted as appropriate  - Keep care items and personal belongings within reach  - Initiate and maintain comfort rounds  - Make Fall Risk Sign visible to staff  - Offer Toileting every  Hours,  in advance of need  - Initiate/Maintain alarm  - Obtain necessary fall risk management equipment:   - Apply yellow socks and bracelet for high fall risk patients  - Consider moving patient to room near nurses station  Outcome: Progressing  Goal: Maintain or return to baseline ADL function  Description: INTERVENTIONS:  -  Assess patient's ability to carry out ADLs; assess patient's baseline for ADL function and identify physical deficits which impact ability to perform ADLs (bathing, care of mouth/teeth, toileting, grooming, dressing, etc.)  - Assess/evaluate cause of self-care deficits   - Assess range of motion  - Assess patient's mobility; develop plan if impaired  - Assess patient's need for assistive devices and provide as appropriate  - Encourage maximum independence but intervene and supervise when necessary  - Involve family in performance of ADLs  - Assess for home care needs following discharge   - Consider OT consult to assist with ADL evaluation and planning for discharge  - Provide patient education as appropriate  Outcome: Progressing  Goal: Maintains/Returns to pre admission functional level  Description: INTERVENTIONS:  - Perform AM-PAC 6 Click Basic Mobility/ Daily Activity assessment daily.  - Set and communicate daily mobility goal to care team and patient/family/caregiver.   - Collaborate with rehabilitation services on mobility goals if consulted  - Perform Range of Motion  times a day.  - Reposition patient every  hours.  - Dangle patient  times a day  - Stand patient  times a day  - Ambulate patient  times a day  - Out of bed to chair  times a day   - Out of bed for meals  times a day  - Out of bed for toileting  - Record patient progress and toleration of activity level   Outcome: Progressing     Problem: DISCHARGE PLANNING  Goal: Discharge to home or other facility with appropriate resources  Description: INTERVENTIONS:  - Identify barriers to discharge w/patient and caregiver  - Arrange for  needed discharge resources and transportation as appropriate  - Identify discharge learning needs (meds, wound care, etc.)  - Arrange for interpretive services to assist at discharge as needed  - Refer to Case Management Department for coordinating discharge planning if the patient needs post-hospital services based on physician/advanced practitioner order or complex needs related to functional status, cognitive ability, or social support system  Outcome: Progressing     Problem: POSTPARTUM  Goal: Experiences normal postpartum course  Description: INTERVENTIONS:  - Monitor maternal vital signs  - Assess uterine involution and lochia  Outcome: Progressing  Goal: Appropriate maternal -  bonding  Description: INTERVENTIONS:  - Identify family support  - Assess for appropriate maternal/infant bonding   -Encourage maternal/infant bonding opportunities  - Referral to  or  as needed  Outcome: Progressing  Goal: Establishment of infant feeding pattern  Description: INTERVENTIONS:  - Assess breast/bottle feeding  - Refer to lactation as needed  Outcome: Progressing  Goal: Incision(s), wounds(s) or drain site(s) healing without S/S of infection  Description: INTERVENTIONS  - Assess and document dressing, incision, wound bed, drain sites and surrounding tissue  - Provide patient and family education  - Perform skin care/dressing changes every   Outcome: Progressing

## 2024-05-02 NOTE — PROGRESS NOTES
Obstetrics Progress Note  Nessa Calabrese 29 y.o. female MRN: [id number]  Unit/Bed#: -01 Encounter: [id number]    Assessment/Plan:  29 y.o.  now postpartum day #2 s/p vaginal birth after  delivery. Stable. Baby in room. By issue:  * Vaginal birth after  ()  Assessment & Plan  Continue routine post partum care  Encourage ambulation and breastfeeding/pumping      Chorioamnionitis  Assessment & Plan  Treated with Unasyn intrapartum  Afebrile in postpartum period  No uterine tenderness     Maternal anemia in pregnancy, antepartum  Assessment & Plan  Hgb 10.2g/dL on admission   Blood loss 492 mL  Recommend oral iron supplement on discharge    History of congenital heart defect-resolved as of 2024  Assessment & Plan  Normal fetal echo 2023 per [facility]      Anticipate discharge later today.    Subjective/Objective   Chief Complaint:   Post delivery     Subjective:   Pain: controlled. Tolerating PO: yes. Voiding: yes. Flatus: . Ambulating: yes. Chest pain: no. Shortness of breath: no. Leg pain: no. Lochia: within normal limits. Infant feeding: breast.    Objective:   Vitals:   Temp:  [97.7 °F (36.5 °C)-97.8 °F (36.6 °C)] 97.7 °F (36.5 °C)  HR:  [] 92  Resp:  [16-18] 16  BP: (113-131)/(73-80) 129/80       Intake/Output Summary (Last 24 hours) at 2024 0653  Last data filed at 2024 0750  Gross per 24 hour   Intake --   Output 600 ml   Net -600 ml       Lab Results   Component Value Date    WBC 6.90 2024    HGB 10.2 (L) 2024    HCT 32.1 (L) 2024    MCV 86 2024     2024       Physical Exam:   General: alert and oriented x3, in no apparent distress  Cardiovascular: regular rateand rhythm  Pulmonary: normal effort, CTA bilaterally  Abdomen: Soft, non-tender, non-distended, no rebound or guarding. Uterine fundus firm and non-tender, at the umbilicus   Extremities: Non tender     Sydnee Crenshaw MD  PGY-IV, OBGYN  2024, 6:53 AM

## 2024-05-02 NOTE — LACTATION NOTE
This note was copied from a baby's chart.  Discharge Lactation: mom states baby is breast feeding well. Mom states baby is taking both breasts at every feeding and is cluster feeding.     Discussed 2nd night syndrome and ways to calm infant. Hand out given. Information on hand expression given. Discussed benefits of knowing how to manually express breast including stimulating milk supply, softening nipple for latch and evacuating breast in the event of engorgement.      Mom is concerned about hearing a clicking noise during 2 feeding sessions overnight. Appears that clicking noise occurred when initial latch was shallow. Once mom unlatched baby and re-latched deeper for noise to stop. Second time, mom readjusted herself and clicking stopped.     Demonstration and teach back of flanging upper/lower lips if clicking noise present. Enc. To make appt at baby and me if noise continues.    Assistance with flanging baby's lips on the breast. Demonstration and teach-back of tugging on chin to flange lower lip and pressing digit into the breast, rolling digit under the upper lip and away from the oral cavity to flange upper lip while baby is latched. Education on how to achieve flanged lips with initial latch and positioning to the breast. Encouraged to un-latch and re-latch baby if mom has pain, discomfort, or if latch appears shallow. Encouraged to call lactation for assistance.       Reviewed monitoring output.    D/c reviewed    Sent EPIC text to baby and me to call mom

## 2024-05-07 PROCEDURE — 88307 TISSUE EXAM BY PATHOLOGIST: CPT | Performed by: PATHOLOGY

## 2024-05-08 ENCOUNTER — TELEPHONE (OUTPATIENT)
Dept: OBGYN CLINIC | Facility: CLINIC | Age: 29
End: 2024-05-08

## 2024-05-08 NOTE — TELEPHONE ENCOUNTER
----- Message from Anshu Pardo sent at 5/7/2024 11:49 AM EDT -----  Regarding: Sooner PP appt  Hello,    Pt called in to set up 3 week pp f/u appt. Practice doesn't have anything available until June. Is this appt okay for the pt or is there a way to get her in sooner?

## 2024-05-09 ENCOUNTER — TELEPHONE (OUTPATIENT)
Age: 29
End: 2024-05-09

## 2024-06-04 ENCOUNTER — POSTPARTUM VISIT (OUTPATIENT)
Dept: OBGYN CLINIC | Facility: CLINIC | Age: 29
End: 2024-06-04

## 2024-06-04 VITALS — SYSTOLIC BLOOD PRESSURE: 94 MMHG | BODY MASS INDEX: 25.94 KG/M2 | DIASTOLIC BLOOD PRESSURE: 50 MMHG | WEIGHT: 132.8 LBS

## 2024-06-04 DIAGNOSIS — Z30.011 ORAL CONTRACEPTIVE PRESCRIBED: ICD-10-CM

## 2024-06-04 PROCEDURE — 99024 POSTOP FOLLOW-UP VISIT: CPT | Performed by: OBSTETRICS & GYNECOLOGY

## 2024-06-04 RX ORDER — ACETAMINOPHEN AND CODEINE PHOSPHATE 120; 12 MG/5ML; MG/5ML
1 SOLUTION ORAL DAILY
Qty: 28 TABLET | Refills: 5 | Status: SHIPPED | OUTPATIENT
Start: 2024-06-04

## 2024-06-04 NOTE — PROGRESS NOTES
Nessa Calabrese  1995    S:  29 y.o. female here for postpartum visit.  She is s/p Uncomplicated vaginal delivery on 2024.   Gender: female   Apgars: 6/8  Weight: 7 lbs 8 oz  Her lochia has resolved.  She is Breastfeeding without problems.   Her pregnancy was complicated by: history of previous , anemia.  She denies postpartum blues/depression.  Huntington score was 0.    We discussed contraceptive options in detail including birth control pills, patches, NuvaRing, DepoProvera, Mirena/Kyleena IUD, Nexplanon in detail.  At this point she would like micronor. Proper use reviewed.    O:   She appears well and in no distress  Abdomen is soft and nontender  External genitals are normal  Vagina is normal  Cervix, uterus and adnexa are nontender, no masses palpable.     A/P:  Postpartum visit.   Rx micronor sent. She will return in 2 months for her yearly exam, sooner PRN.

## 2025-05-20 ENCOUNTER — TELEPHONE (OUTPATIENT)
Age: 30
End: 2025-05-20

## 2025-05-20 NOTE — TELEPHONE ENCOUNTER
Call to pt. She gave birth 4/30/24 and has not had a period since. States that for the last month she has been experiencing mild nausea and lightheadedness, describes it as having an empty stomach on a roller coaster. Denies feeling like she will pass out. States she had these same symptoms with last two pregnancies. Pt had a positive pregnancy test on 5/11/25. Scheduled for D&V 5/27/25 and is thankful.

## 2025-05-20 NOTE — TELEPHONE ENCOUNTER
Pt called with positive pregnancy. Pt stated she has yet to have a menstrual period since before last pregnancy (delivered 04/29). Pt looking to schedule at [place] office. No answer from CTS. Pt made aware of message sent.

## 2025-05-27 ENCOUNTER — ULTRASOUND (OUTPATIENT)
Dept: OBGYN CLINIC | Facility: MEDICAL CENTER | Age: 30
End: 2025-05-27

## 2025-05-27 VITALS — WEIGHT: 125 LBS | SYSTOLIC BLOOD PRESSURE: 112 MMHG | DIASTOLIC BLOOD PRESSURE: 68 MMHG | BODY MASS INDEX: 24.41 KG/M2

## 2025-05-27 DIAGNOSIS — Z36.89 ENCOUNTER TO ESTABLISH GESTATIONAL AGE USING ULTRASOUND: ICD-10-CM

## 2025-05-27 DIAGNOSIS — N91.2 AMENORRHEA: Primary | ICD-10-CM

## 2025-05-27 PROCEDURE — 76817 TRANSVAGINAL US OBSTETRIC: CPT | Performed by: NURSE PRACTITIONER

## 2025-05-27 PROCEDURE — 99213 OFFICE O/P EST LOW 20 MIN: CPT | Performed by: NURSE PRACTITIONER

## 2025-05-27 NOTE — PROGRESS NOTES
"Name: Nessa Calabrese      : 1995      MRN: [id number]  Encounter Provider: HOPE Velasquez  Encounter Date: 2025   Encounter department: [facility] [place]    :  Assessment & Plan  Amenorrhea  Start daily prenatal vitamin with folic acid, DHA and iron.  Referred to [facility] for genetic testing. Pamphlet provided. Call to schedule.   Preprinted \"Medications During Pregnancy\" sheet provided.   Formal dating ultrasound ordered. Call to schedule.   Nurse navigator pre printed information provided.   Return to office in 2 weeks for OB intake and PN1.   Orders:    AMB US OB < 14 weeks single or first gestation level 1    Ambulatory Referral to Maternal Fetal Medicine; Future    US OB limited Dating Study; Future    Encounter to establish gestational age using ultrasound    Orders:    AMB US OB < 14 weeks single or first gestation level 1    US OB limited Dating Study; Future             History of Present Illness     Nessa Calabrese is a 30 y.o. female. Here for No chief complaint on file.      Newly Pregnant  LMP unknown (LMP prior to giving birth 24)    Menstrual cycle:Had spotting once . Nothing since.   Currently breastfeeding.   Pregnancy was planned.   She has started taking a prenatal vitamin    She is C/O amenorrhea  Signs and symptoms of pregnancy: breast tenderness, nausea with rare vomiting.   OB History    Para Term  AB Living   2 2 2   2   SAB IAB Ectopic Multiple Live Births      0 2      # Outcome Date GA Lbr Tyrone/2nd Weight Sex Type Anes PTL Lv   2 Term 24 40w5d / 01:02 3402 g (7 lb 8 oz) F Vag-Spont EPI N BRIGHT   1 Term 22 39w5d  3120 g (6 lb 14.1 oz) M CS-LTranv EPI, Spinal  BRIGHT        The following portions of the patient's history were reviewed and updated as appropriate: allergies, current medications, past family history, past medical history, past social history, past surgical history, and problem " list.    Perinent hx that may affect pregnancy:  Prior C/S and         Review of Systems    See HPI for pertinent positives.             There were no vitals taken for this visit.  OBGyn Exam  Results for orders placed or performed during the hospital encounter of 24   L&D GREEN / YELLOW ON HOLD   Result Value Ref Range    Extra Tube Hold for add-ons.    CBC   Result Value Ref Range    WBC 6.90 4.31 - 10.16 Thousand/uL    RBC 3.73 (L) 3.81 - 5.12 Million/uL    Hemoglobin 10.2 (L) 11.5 - 15.4 g/dL    Hematocrit 32.1 (L) 34.8 - 46.1 %    MCV 86 82 - 98 fL    MCH 27.3 26.8 - 34.3 pg    MCHC 31.8 31.4 - 37.4 g/dL    RDW 15.4 (H) 11.6 - 15.1 %    Platelets 229 149 - 390 Thousands/uL    MPV 12.6 8.9 - 12.7 fL   CORD, Blood gas, arterial   Result Value Ref Range    pH, Cord Art 7.134 (L) 7.230 - 7.430    pCO2, Cord Art 56.8 30.0 - 60.0    pO2, Cord Art 12.3 5.0 - 25.0 mm HG    HCO3, Cord Art 18.6 17.3 - 27.3 mmol/L    Base Exc, Cord Art -11.2 (L) 3.0 - 11.0 mmol/L    O2 Content, Cord Art 3.8 ml/dl    O2 Hgb, Arterial Cord 16.0 %   CORD, Blood gas, venous   Result Value Ref Range    pH, Cord Oscar 7.245 7.190 - 7.490    pCO2, Cord Oscar 44.3 (H) 27.0 - 43.0 mm HG    pO2, Cord Oscra 29.4 15.0 - 45.0 mm HG    HCO3, Cord Oscar 18.8 12.2 - 28.6 mmol/L    Base Exc, Cord Oscar -8.4 (L) 1.0 - 9.0 mmol/L    O2 Cont, Cord Oscar 16.5 mL/dL    O2 HGB,VENOUS CORD 65.7 %   Type and screen   Result Value Ref Range    ABO Grouping O     Rh Factor Positive     Antibody Screen Negative     Specimen Expiration Date 2024    Tissue Exam OB (Placenta) Only   Result Value Ref Range    Case Report       Surgical Pathology Report                         Case: [id number]                                  Authorizing Provider:  Sarah Valderrama DO       Collected:           2024 2245              Ordering Location:     [facility]        Received:            2024 0654                                     Noman Brito and                                                                                   Delivery                                                                     Pathologist:           Prabha Wise MD                                                         Specimen:    Placenta                                                                                   Final Diagnosis       A. Placenta, vaginal delivery:  - Intact mature placenta with centrally inserted, three-vessel umbilical cord.  - Placental size & weight (601 grams) are between 75-90th percentile for 40.5 week     gestation with uniform & appropriate villous morphology.  - Acute chorioamnionitis, fetal and maternal inflammatory response stage 2,     grade 1-2 (moderate to severe intensity).     -- Acute umbilical phlebitis and arteritis with perivasculitis.     -- Acute chorionic vasculitis and involvement of chorionic plate; acute subchorionitis.   - No deciduitis and no villitis seen.  - Amnionic pigmented macrophages consistent with meconium staining and/or siderosis.     -- Amnionic epithelial necrosis suggestive of prolonged meconium exposure.  - Changes suggestive of maternal vascular malperfusion:    -- Recent and chronic placental infarcts comprise < 5% of total placental disc.    -- Subchorionic fibrin with siderosis consistent with chronic hemorrhage, cannot exclude        chronic peripheral placental separation.     -- Microscopic chorionic pseudocysts.   - No fetal vascular malperfusion.   - No malformative conditions and no neoplasia seen.        Additional Information       All reported additional testing was performed with appropriately reactive controls.  These tests were developed and their performance characteristics determined by [facility] or appropriate performing facility, though some tests may be performed on tissues which have not been validated for performance characteristics (such as staining performed on alcohol  "exposed cell blocks and decalcified tissues).  Results should be interpreted with caution and in the context of the patients’ clinical condition. These tests may not be cleared or approved by the U.S. Food and Drug Administration, though the FDA has determined that such clearance or approval is not necessary. These tests are used for clinical purposes and they should not be regarded as investigational or for research. This laboratory has been approved by [place] 88, designated as a high-complexity laboratory and is qualified to perform these tests.  Interpretation performed at [facility], [address] [address].      Gross Description       A. The specimen is received in formalin, labeled with the patient's name and hospital number, and is designated \"placenta\".  The specimen consists of a placenta with attached membranes and umbilical cord.  Also submitted in the container is a blood clot measuring 10.3 x 2.7 x 0.8 cm.  The umbilical cord is centrally inserted, contains 3 vessels, exhibits a normal left coiling index, is located 6.3 cm from placental edge, and measures 27.8 cm in length and ranges from 0.8 cm to 1.1 cm in diameter.  The fetal membranes are grayish-white, show marginal insertion, and are thin partially translucent and partially cloudy.  The fetal surfaces are grayish-tan purple, shiny, exhibits a normal distribution of blood vessels, and exhibits 5 centrally located tan foci measuring up to 1.3 cm in greatest dimension and comprising less than 5% of surface area.  The maternal surface is intact, and exhibits loosely adherent blood clots comprising 25% of surface area.  The placental disc is round in shape, and measures 18.6 x 17.9 x 3.8 cm and after the umbilical cord and membranes are removed weigh 601 g.  Upon cut section, the tissue is red-tan and spongy, and exhibits 1 tan fibrous focus measuring 1.2 cm in greatest dimension, comprising less than 5% of the total disc volume.  " Representative sections. Nine cassettes.    Sections are submitted as follows:  1:  Umbilical cord  2:  Fetal membranes  3-6: Body of placenta, representative sections, with a complete section bisected in cassette 3-4; 5-6  7-8: Fetal surface fibrous-appearing foci  9: Additional fibrous focus    Note: The estimated total formalin fixation time based upon information provided by the submitting clinician and the standard processing schedule is under 72 hours.  [place]         FIRST TRIMESTER OBSTETRIC ULTRASOUND     No LMP recorded.    INDICATION: Establish Gestational Age       FINDINGS:  See imaging report for details     Additional Findings: none     FHR: 155  IMPRESSION:    Single intrauterine pregnancy of questionable 16 weeks 0 days gestational age.   She is being send for a formal dating ultrasound.   Fetal cardiac activity detected.  No adnexal masses seen.  EDC by LMP: unknown  EDC by this Ultrasound: questionable 25. Will be determined by formal dating ultrasound.     Assigning a Final MELODIE  Will be determined by formal dating ultrasound.     GERALDINE Velasquez   CENTER -[facility] [address]  [facility] [address]  Dept: [phone]  Dept Fax: [phone]  Loc: [phone]  Loc Fax: [phone]  Ultrasound Probe Disinfection    A transvaginal ultrasound was performed.   Prior to use, disinfection was performed with High Level Disinfection Process ([employer]).  Probe serial number: [id number] was used.

## 2025-05-27 NOTE — PATIENT INSTRUCTIONS
"Start daily prenatal vitamin with folic acid, DHA and iron.  Referred to [facility] for genetic testing. Pamphlet provided. Call to schedule.   Preprinted \"Medications During Pregnancy\" sheet provided.   Formal dating ultrasound ordered. Call to schedule.   Nurse navigator pre printed information provided.   Return to office in 2 weeks for OB intake and PN1.     Again, congratulations on your pregnancy!  NEXT STEPS    Return to our office in 1-2 weeks for an OB intake and initial prenatal Exam(or sooner if any problems/concerns arise- see packet for things to report)  Check out [employer] website to read the \"Pregnancy Essentials Guide\" -this has lots of great early pregnancy information     It can be found by going to [employer]-->select services-->select women's health-->select Obstetrics  [email or web address]     Below is a variety of information that is useful in early pregnancy   Genetic screening can be very confusing for most people   We do recommend genetic screening universally for all pregnant women. Our goal is to have the most healthy uncomplicated pregnancy possible and this is one way to identify possible complications early.  Common disorders we can screen for include: Down Syndrome, Neural tube defects ( like spina bifida or gastroschisis) and trisomy 13, even possibly more  There are several options we will talk more about. Below is some information to get you started thinking about this.  We will discuss this more at the next appt.      GUIDE TO GENETIC TESTING     Aneuploidy Testing  Trisomy 21 (Down Syndrome), Trisomy 18, and Open Neural Tube Defects (Spina Bifida).  You may choose one of the following options: See below for CPT/Diagnostic codes  NIPT (Non-Invasive Prenatal Testing or Cell Free- Fetal DNA): This simple and accurate non-invasive prenatal screening blood test offers results for early risk assessment of Down syndrome (Trisomy 21), or Trisomy 18, " trisomy 13 and other aneuploidy conditions.  The test also offers an optional analysis for fetal sex.  The test analyzes the relative amount of 21, 18, 13; X and Y chromosome material in circulating cell-free DNA from a maternal blood sample.  This test can be performed at any time after 10 weeks gestation.  If you elect this test, you will also have an AFP (alpha-fetoprotein) blood test to test for open neural tube defects.  Recommended follow up to a positive result is genetic counseling and prenatal diagnosis.     Sequential Screening with Nuchal Translucency: This is a two-step test to detect whether a fetus is at increased risk for trisomy 21, trisomy 18, trisomy 13 and open neural tube defects.  The test has a narrow window for testing (the first step must be performed between 10 and 13 weeks gestation).  It includes two blood draws and an ultrasound.  The ultrasound measures the amount of fluid behind the baby’s neck called the nuchal translucency (NT).  The blood tests measures three different maternal hormone levels, -- pregnancy associated plasma protein (LUPE-A), human chorionic gonadotrophin (hCG), and dimeric inhibin A (YARIEL).  These measurements in combination with some maternal information such as height and weight are used to calculate whether the baby is at increased risk for Down Syndrome or Trisomy 18.  An alpha-fetoprotein test (AFP) is also included to test for open neural tube defects.  Recommended follow up to a positive result is additional testing that is more definitive, and referral for genetic counseling and prenatal diagnosis.    Quad Screen: This test is also known as the quadruple marker test.  It is a test that measures levels of four substances in a pregnant woman’s blood--Alpha-fetoprotein (AFP), a protein made by the developing baby; Human chorionic gonadotropin (hCG), a hormone made by the placenta; Estriol, a hormone made by the placenta and the baby’s liver; and Inhibin A,  another hormone made by the placenta.  Typically, the quad screen is done between weeks 15 and 20 of pregnancy--the second trimester and the results indicate whether the baby is at higher risk for Down Syndrome (Trisomy 21), Trisomy 18, and open neural tube defects.  This is a screening test.  Recommended follow up to a positive result is additional testing that is more definitive, as well as referral for genetic counseling and prenatal diagnosis.         Trisomy 21 Trisomy 18 Trisomy 13   NIPT  (FPR 0.1%) <99% <98% 99%   Sequential Screening (FPR 3.5%) 92% 90% N/A   Quad Screen   (FPR 5%) 83% 80% N/A   (FPR is False Positive Rate)        Additional Screening Tests Offered  Cystic Fibrosis: Cystic Fibrosis is the most common inherited disease of children and young adults.  The carrier frequency is 1 in 24, to 1 in 97.  Both parents need to be carriers for a child to be affected (25% chance).  One in 3500, children born are affected.  Cystic fibrosis is a disorder of mucus production and produces abnormally thick mucus leading to life threatening lung infections, digestion problems, poor growth, infertility, and more.  Symptoms range from mild to severe, but individuals with severe disease may die in childhood.  With treatments today, people with Cystic Fibrosis can live into their 30’s.  CF does not affect intelligence.  Recommended follow up to a positive result is testing of the baby’s father.  Spinal Muscular Atrophy (SMA): SMA is the most common inherited cause of early childhood death.  The carrier frequency is 1 in 47 to 1 in 72 in the [place] and both parents need to be carriers for a child to be affected (25% chance).  1 in 11,000 children are affected.  SMA is a progressive degeneration of lower motor neurons.  Muscle weakness is the most common type with respiratory failure by the age of 2 years old.  Muscles responsible for crawling, walking, swallowing, and head and neck control are most severely affected.   Recommended follow up to a positive result is testing of the baby’s father.      Fragile X Syndrome (the most common inherited cause of developmental delays): Fragile X syndrome is an “X-linked” genetic disease, which means it is only carried by the mom.  Unfortunately, 1 in 250 females are carriers and a child has a 50% chance of being affected if this is the case.  1 in 4000 boys is affected with Fragile X and 1 in 8000 girls is affected.  Approximately 1/3 of all children born with Fragile X also have autism and hyperactivity.  Recommended follow up to a positive result is genetic counseling and prenatal diagnosis.       Guide To Insurance Coverage For Genetic Screening  Due to the complexity of coverage guidelines, we are unable to quote benefits or guarantee insurance coverage for any of these tests.  Insurance benefits are plan-specific and offer vastly different coverage based on your policy.  The handout attached explains the benefits to each test, and also provides the billing (CPT) codes for each test.  Even if the testing is covered, it could be applied to any unmet deductibles, and copays may apply, resulting in a bill.  You are encouraged to contact your insurance company to obtain your benefits based on your age and risk factors, so that there are no surprises.       Test Insurance Procedure (CPT) code   NIPT-cell free fetal DNA Most accurate non-invasive test- not always covered w/o risk factors 84054   Sequential Screen w/ NT US Current standard test-should be covered Part 1: (if lab is [facility]) 45380,39854   (If lab is [facility]) 83995  Part 2: (if lab is [facility])17573,12161,57904, 35119  (If lab is [facility]) 27243   Quad screen Old standard-use if past 1st trimester 13569, 25283, 62775, 72965   CF- Cystic Fibrosis   53097   SMA- Spinal Musc. Atrophy   06938   Fragile X   22270         Diagnosis code used Varies based on history- But will be one of these:  Encounter for  screening for other  genetic defect Z36.8  Advanced Maternal Age (over 35) O09.529  Family History of Genetic Disease Carrier Z84.81     Please contact your insurance company with the appropriate CPT code from the attached list, and diagnosis code applicable to your situation.     We ask that you review this information and decide what testing you would like to have performed.  Please note that the Sequential Screening with Nuchal Translucency has a smaller window of time to be performed during pregnancy (Prior to 14 wks).           Warning Signs During Pregnancy  The list below includes warning signs your providers would like you to be aware of.  If you experience any of these at any time during your pregnancy, please call us as soon as possible.      Vaginal bleeding   Sharp abdominal pain that does not go away   Fever (more than 100.4?F and is not relieved with Tylenol)   Persistent vomiting lasting greater than 24 hours   Chest pain   Pain or burning when you urinate   Severe headache that doesn’t resolve with Tylenol   Blurred vision or seeing spots in your vision   Sudden swelling of your face or hands   Redness, swelling or pain in a leg   A sudden weight gain in just a few days   Decrease in your baby’s movements (after 28 weeks or the 6th month of pregnancy)   A loss of watery fluid from your vagina - can be a gush, a trickle or  continuous wetness   After 20 weeks of pregnancy, rhythmic cramping (greater than 4 per hour)  or menstrual like low/pelvic pain     Call the OFFICE [phone] for any questions/emergencies.  At night or on the weekend, please indicate it is an emergency and the DOCTOR on call will be paged.     Discomforts of Early Pregnancy     Tips for coping with nausea and vomiting during pregnancy   Eat meals and snacks slowly   Eat every 1-2 hours to avoid a full stomach   Don’t skip meals, avoid empty stomach   Eat a snack prior to getting out of bed   Avoid food and beverages with a strong aroma   Avoid  dehydration - drink enough fluid to keep the urine pale yellow   Drink fluids before a meal to minimize the effect of a full stomach   Limit the amount of coffee and beverages that contain caffeine   Eliminate spicy, odorous, high fat (fried foods), acidic (tomato products) and sweet foods   Fluids that contain lemon (lemonade), mint (tea) or orange can usually be well tolerated   Snacks and meals that contain low-fat protein (lean meats, fish, poultry and eggs) along with eating easily digestible carbs (fruit, rice, toast, crackers and dry cereal) may be tolerated better   Foods with ginger may be well tolerated. May use ginger root powder, capsules or extract (up to 1000 mg per day)   Drink liquids in small amounts     If symptoms persist, please contact your provider.        Tips for coping with constipation during pregnancy   Increase fiber and fluids.  - Drink 8-10 cups of liquid, like water or low-sugar juice daily  - Keep urine pale with fluids (water, milk), fruit and vegetables   Eat a well-balanced diet that contains high fiber food (fruits, vegetables, whole grain breads and cereals, bran and dried beans)   Take a 30-minute walk daily   You may take a mild stool softener such as Colace®     If symptoms persist, please contact your provider.        For any emergencies, PLEASE CALL THE OFFICE at [phone]. If the office is closed, the doctor on call will be paged by the answering service.     Medications and Pregnancy- see Pregnancy Essentials guide online- page 9     Expected Weight Gain During Pregnancy  If you have a healthy BMI (18-25) prior to pregnancy:  The recommended weight gain is between 25-35 pounds. Approximate weight gain  in the first trimester is 1-4.5 pounds. An expected weight gain during the second and  third trimester is approximately one pound per week.  If you have a BMI of less than 18 prior to pregnancy,  you are considered underweight:  The recommended weight gain is between  28-40 pounds. Approximate weight gain  in the first trimester is 1-4.5 pounds. An expected weight gain during the second and  third trimester is just over one pound per week.  If your BMI is 25 to 29.9: you are considered overweight:  You should gain 1/2 to 2/3 pound during the second and third trimester, for a total  weight gain of 15 to 25 pounds.  If you have a BMI of greater than 30 prior to pregnancy,  you are considered overweight:  The recommended weight gain is between 15-25 pounds. Approximate weight gain  in the first trimester is 1-4.5 pounds. An expected weight gain during the second  and third trimester is approximately 0.5 pound per week.     Foods to avoid during pregnancy:   Unpasteurized milk, juice and cheese  - Soft cheeses like feta or brie (if made with UNPASTEURIZED milk)   Unheated deli meats like lunchmeat and hotdogs   Undercooked poultry, beef, pork, seafood including raw sushi     What fish is safe to eat during pregnancy?  Eat 8 to 12 ounces of fish a week. Pick from this group frequently, especially if you follow  the American Heart Association’s recommendation to eat fish at least 2 times a week.     AVOID HIGH MERCURY FISH  A single meal of the following fish can put  you over the Environmental Protection  Agency’s safe limit for the month.  High mercury fish:  Shark  Swordfish  Emanuel Mackerel  Tile Fish     Caffeine and Pregnancy     The March of D[employer] and American College of Obstetrics and Gynecologists (ACOG) urge pregnant  women to limit their caffeine consumption to no more than 200 milligrams (mg) per day. This is  comparable to having one 12-ounce cup of coffee a day. This level has been shown not to increase risk  of miscarriage, growth or  labor complications. Effects of higher levels are not known.     Exercise During Pregnancy  A daily exercise program that consists of 30 minutes a day is recommended.   Low impact exercises like walking and swimming are great exercises  throughout  all of pregnancy   If you’re an avid strength  avoid lifting very heavy weights - nothing more  than 30 pounds     Drink plenty of fluids while exercising to stay hydrated.  Be careful to avoid overheating.     ACTIVITIES TO AVOID   Exercises that can make you lose your balance.   Activities that can put your baby at risk i.e. horseback riding, scuba diving, skiing  or snowboarding. Any other sport that puts you at risk for getting hit in the  abdominal area.   Do not use saunas, steam rooms or hot tubs (that have a higher temperature  than 100F)   After the first trimester, avoid exercises that require you to lay flat on your back.   Avoid exceeding a heart rate greater than 140 beats per minute. As long as you are  able to hold a conversation while exercising your heart rate is likely acceptable

## 2025-06-02 ENCOUNTER — TELEPHONE (OUTPATIENT)
Age: 30
End: 2025-06-02

## 2025-06-03 ENCOUNTER — TELEPHONE (OUTPATIENT)
Dept: OBGYN CLINIC | Facility: CLINIC | Age: 30
End: 2025-06-03

## 2025-06-03 NOTE — TELEPHONE ENCOUNTER
As of 6/3 pt is still not scheduled with radiology for dating US.      M has reached out to patient 6/2 for dating US.

## 2025-06-03 NOTE — TELEPHONE ENCOUNTER
Left message for patient encouraging her to reach out to MFM prior to OB intake on 6/5 for dating ultrasound.

## 2025-06-05 ENCOUNTER — TELEPHONE (OUTPATIENT)
Dept: OBGYN CLINIC | Facility: CLINIC | Age: 30
End: 2025-06-05

## 2025-06-05 NOTE — TELEPHONE ENCOUNTER
Left message for patient regarding OB intake appointment. If she can call back within 15 min we can continue with intake appointment today, the office line was given if this appointment time no longer works and she needs it to be rescheduled.

## 2025-06-11 ENCOUNTER — TELEPHONE (OUTPATIENT)
Age: 30
End: 2025-06-11

## 2025-06-11 NOTE — TELEPHONE ENCOUNTER
Patient called to schedule dating US, warm transfer to [facility] unsuccessful. Please call patient back to schedule. Patient states she did call the central scheduling number and was advised to call [facility].

## 2025-06-16 ENCOUNTER — ANCILLARY PROCEDURE (OUTPATIENT)
Dept: PERINATAL CARE | Facility: OTHER | Age: 30
End: 2025-06-16
Attending: NURSE PRACTITIONER

## 2025-06-16 ENCOUNTER — ROUTINE PRENATAL (OUTPATIENT)
Dept: PERINATAL CARE | Facility: OTHER | Age: 30
End: 2025-06-16
Attending: NURSE PRACTITIONER

## 2025-06-16 VITALS
WEIGHT: 127.2 LBS | DIASTOLIC BLOOD PRESSURE: 52 MMHG | HEART RATE: 99 BPM | HEIGHT: 60 IN | SYSTOLIC BLOOD PRESSURE: 94 MMHG | BODY MASS INDEX: 24.97 KG/M2

## 2025-06-16 DIAGNOSIS — Z36.0 ENCOUNTER FOR ANTENATAL SCREENING FOR CHROMOSOMAL ANOMALIES: ICD-10-CM

## 2025-06-16 DIAGNOSIS — Q24.9 CONGENITAL HEART DISEASE IN PREGNANCY: ICD-10-CM

## 2025-06-16 DIAGNOSIS — O34.219 HISTORY OF CESAREAN DELIVERY, ANTEPARTUM: ICD-10-CM

## 2025-06-16 DIAGNOSIS — Z36.87 ENCOUNTER FOR ANTENATAL SCREENING FOR UNCERTAIN DATES: ICD-10-CM

## 2025-06-16 DIAGNOSIS — O99.891 CONGENITAL HEART DISEASE IN PREGNANCY: ICD-10-CM

## 2025-06-16 DIAGNOSIS — Z3A.18 18 WEEKS GESTATION OF PREGNANCY: ICD-10-CM

## 2025-06-16 DIAGNOSIS — Z34.90 PREGNANCY, UNSPECIFIED GESTATIONAL AGE: ICD-10-CM

## 2025-06-16 DIAGNOSIS — Z36.86 ENCOUNTER FOR ANTENATAL SCREENING FOR CERVICAL LENGTH: ICD-10-CM

## 2025-06-16 DIAGNOSIS — Z36.3 ENCOUNTER FOR ANTENATAL SCREENING FOR MALFORMATION: ICD-10-CM

## 2025-06-16 DIAGNOSIS — O09.899 SHORT INTERVAL BETWEEN PREGNANCIES AFFECTING PREGNANCY, ANTEPARTUM: ICD-10-CM

## 2025-06-16 DIAGNOSIS — Z82.79 FAMILY HISTORY OF CONGENITAL OR GENETIC CONDITION: Primary | ICD-10-CM

## 2025-06-16 PROBLEM — O41.1290 CHORIOAMNIONITIS: Status: RESOLVED | Noted: 2024-05-02 | Resolved: 2025-06-16

## 2025-06-16 PROCEDURE — 76817 TRANSVAGINAL US OBSTETRIC: CPT | Performed by: OBSTETRICS & GYNECOLOGY

## 2025-06-16 PROCEDURE — 76811 OB US DETAILED SNGL FETUS: CPT | Performed by: OBSTETRICS & GYNECOLOGY

## 2025-06-16 PROCEDURE — 36415 COLL VENOUS BLD VENIPUNCTURE: CPT | Performed by: OBSTETRICS & GYNECOLOGY

## 2025-06-16 PROCEDURE — NC001 PR NO CHARGE: Performed by: OBSTETRICS & GYNECOLOGY

## 2025-06-16 PROCEDURE — 99243 OFF/OP CNSLTJ NEW/EST LOW 30: CPT | Performed by: OBSTETRICS & GYNECOLOGY

## 2025-06-16 NOTE — PROGRESS NOTES
Patient chose to have [facility] [id number] Non-Invasive Prenatal Screen [id number] PLUS w/ ESS + SCA, WITH fetal sex.  Patient choose to be billed through insurance.     Patient given brochure and is aware [facility] will contact patient's insurance and coordinate coverage.  Provided [facility] contact information. General inquiries [phone], Cost estimates [phone] and [facility] Billing [phone]. Website [email or web address].     Blood collection tubes labeled with patient identifiers (name, medical record number, and date of birth).     Filled out [facility] order form. Patient chose to have blood drawn in our office at time of visit. NIPS was drawn from right arm with a straight needle by Maximo KWAN RN.    Maternal Fetal Medicine will have results in approximately 5-7 business days and will call patient or notify via [employer].  Patient aware viewing lab result online will reveal fetal sex if ordered.    Patient verbalized understanding of all instructions and no questions at this time.

## 2025-06-16 NOTE — ASSESSMENT & PLAN NOTE
S/p  in subsequent pregnancy. Now with short interval pregnancy. Placenta is posterior on today's ultrasound without suspicion for PAS

## 2025-06-16 NOTE — ASSESSMENT & PLAN NOTE
"We reviewed the reassuring anatomic survey and cervical length today. Today's ultrasound also serves as her first formal dating ultrasound in this pregnancy.    See today's [facility] US report under \"imaging\" tab. Although encouraging, even a normal-appearing ultrasound cannot exclude all malformations, or the possibility of a genetic syndrome.     We reviewed the availability of genetic screening, as well as diagnostic testing, which are available to all pregnant women. We reviewed limitations, risks, and benefits of screening and testing. 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  She does not wish to pursue diagnostic testing at this time.    MSAFP screening should be offered and ordered through your office by 20 weeks gestation,     "

## 2025-06-16 NOTE — PROGRESS NOTES
The patient was located in [place] at the time of the visit, a [facility] in which Dr. Ram holds an active license. Patient acknowledged consent and understanding of privacy and security of the video platform. The patient has agreed to participate and understands they can discontinue the visit at any time. The patient was counseled via synchronous telemedicine encounter using Teams Virtual [employer]ing.

## 2025-06-16 NOTE — LETTER
2025     HOPE Bean  [address]  [address]  [facility] [address]    Patient: Nessa Calabrese   YOB: 1995   Date of Visit: 2025       Dear HOPE Bean:    Thank you for referring Nessa Calabrese to me for evaluation. Below are my notes for this consultation.    If you have questions, please do not hesitate to call me. I look forward to following your patient along with you.         Sincerely,        Angy Ram MD        CC: No Recipients    Angy Ram MD  2025  9:12 AM  Sign when Signing Visit  CONSULTATION: MATERNAL-FETAL MEDICINE    Dear Hope Bean  [address]   [address]  [place] [address],    Thank you very much for your kind referral of patient Nessa Calabrese for Maternal-Fetal Medicine consultation. As you know, Nessa is a 30 y.o.  at 18w0d presenting for consultation for genetic screening.  She has no complaints today.    Her history is significant for:  Problem List[1]    Obstetric History:  OB History    Para Term  AB Living   3 2 2   2   SAB IAB Ectopic Multiple Live Births      0 2      # Outcome Date GA Lbr Tyrone/2nd Weight Sex Type Anes PTL Lv   3 Current            2 Term 24 40w5d / 01:02 3402 g (7 lb 8 oz) F Vag-Spont EPI N BRIGHT   1 Term 22 39w5d  3120 g (6 lb 14.1 oz) M CS-LTranv EPI, Spinal  BRIGHT       Past Medical History:  Past Medical History[2]      Past Surgical History:  Past Surgical History[3]    Social History:   She denies current use of alcohol, drugs of abuse, tobacco, and marijuana products.   Occupation: She is a    Partner: Ricci is 38 and works in construction    Family History:  Family history was reviewed using an office screening tool, and includes her prior son with PAX6 gene deletion/WAGR syndrome, and personal history of patent PFO.    Medications:  Current Medications[4]    Allergies:  No Known Allergies      "    Exam:  Vitals: Blood pressure 94/52, pulse 99, height 5' (1.524 m), weight 57.7 kg (127 lb 3.2 oz), currently breastfeeding.  Physical Exam  On examination, she is awake, alert and oriented. Mood and affect are appropriate.  The remainder of her physical examination was deferred as she was here today for consultation and discussion.    My recommendations are as follows:     18 weeks gestation of pregnancy  We reviewed the reassuring anatomic survey and cervical length today. Today's ultrasound also serves as her first formal dating ultrasound in this pregnancy.    See today's [facility] US report under \"imaging\" tab. Although encouraging, even a normal-appearing ultrasound cannot exclude all malformations, or the possibility of a genetic syndrome.     We reviewed the availability of genetic screening, as well as diagnostic testing, which are available to all pregnant women. We reviewed limitations, risks, and benefits of screening and testing. 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  She does not wish to pursue diagnostic testing at this time.    MSAFP screening should be offered and ordered through your office by 20 weeks gestation,       Family history of congenital or genetic condition  Nessa had genetic counseling in her prior pregnancy for her son's PAX6 gene deletion/WAGR syndrome.  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    Last pregnancy she elected to proceed with MaterniT Genome, which was discussed with her again today including limitations of false negative and false positive with microdeletion screening. She declines CVS or amniocentesis at this time.        Short interval between pregnancies affecting pregnancy, antepartum  Short Interval Pregnancy: Short interval between pregnancies (<18 months between pregnancies) is associated with increased risk for " " birth and low birthweight. A third trimester growth ultrasound is recommended.      Congenital heart disease in pregnancy  We reviewed her history of a PFO which was closed surgically. She had a normal echo with evidence of prior closure device in . An updated echocardiogram and evaluation by cardiology at least once this pregnancy is advised and a referral was placed. This is considered a mWHO class 1 lesion without significantly increased risk of adverse maternal outcomes if her evaluation and updated echocardiogram are unremarkable.    Fetal echo advised at 22-24 weeks as it was completed in prior pregnancies as well. Discussed that failure of PFO closure is undetectable prenatally as it is normally present in fetal life.    History of  delivery, antepartum  S/p  in subsequent pregnancy. Now with short interval pregnancy. Placenta is posterior on today's ultrasound without suspicion for PAS     Please see today's [facility] ultrasound report documented separately under \"imaging\" tab in Epic.       Sincerely,    Angy Ram MD  Attending Physician, Maternal-Fetal Medicine  [facility]    VIRTUAL VISIT DISCLAIMER    Nessa Calabrese acknowledges that she has consented to an online visit or consultation. She understands that the online visit is based solely on information provided by her, and that, in the absence of a face-to-face physical evaluation by the physician, the diagnosis she receives is both limited and provisional in terms of accuracy and completeness. This is not intended to replace a full medical face-to-face evaluation by the physician. Nessa Calabrese understands and accepts these terms. Patient Location [facility]    The patient was identified by name and date of birth. Nessa Calabrese was informed that this is a telemedicine visit and that the visit is being conducted through the [employer] Teams platform. She agrees to proceed..  My office door was " closed. No one else was in the room.  She acknowledged consent and understanding of privacy and security of the video platform. The patient has agreed to participate and understands they can discontinue the visit at any time. Patient is aware this is a billable service.        [1]   Patient Active Problem List  Diagnosis   • Family history of breast cancer   • History of anemia   • Vaginal birth after  ()   • Supervision of normal pregnancy   • History of  delivery, antepartum   • Family history of congenital or genetic condition   • Maternal anemia in pregnancy, antepartum   • Short interval between pregnancies affecting pregnancy, antepartum   • Congenital heart disease in pregnancy   • Encounter for  screening for uncertain dates   • 18 weeks gestation of pregnancy   [2]   Past Medical History:  Diagnosis Date   • Acne    • Heart valve disease     congenital patent ductus arteriosus   • Status post primary low transverse  section 2022   • Varicella     had vaccines   • Verruca     On hand   [3]   Past Surgical History:  Procedure Laterality Date   • CARDIAC SURGERY      age 1   • CHOLECYSTECTOMY  2019   • ME  DELIVERY ONLY N/A 2022    Procedure:  SECTION ();  Surgeon: Arlin Gracia MD;  [facility];  Service: Obstetrics   • ME EXTERNAL CEPHALIC VERSION W/WO TOCOLYSIS N/A 2022    Procedure: VERSION EXTERNAL CEPHALIC;  Surgeon: Arlin Gracia MD;  Location: [facility];  Service: Obstetrics   [4]   Current Outpatient Medications:   •  Ferrous Sulfate (Iron) 325 (65 Fe) MG TABS, Take by mouth, Disp: , Rfl:   •  Prenatal MV & Min w/FA-DHA (PRENATAL GUMMIES PO), Take by mouth, Disp: , Rfl:

## 2025-06-16 NOTE — ASSESSMENT & PLAN NOTE
Short Interval Pregnancy: Short interval between pregnancies (<18 months between pregnancies) is associated with increased risk for  birth and low birthweight. A third trimester growth ultrasound is recommended.

## 2025-06-16 NOTE — ASSESSMENT & PLAN NOTE
We reviewed her history of a PFO which was closed surgically. She had a normal echo with evidence of prior closure device in 2021. An updated echocardiogram and evaluation by cardiology at least once this pregnancy is advised and a referral was placed. This is considered a mWHO class 1 lesion without significantly increased risk of adverse maternal outcomes if her evaluation and updated echocardiogram are unremarkable.    Fetal echo advised at 22-24 weeks as it was completed in prior pregnancies as well. Discussed that failure of PFO closure is undetectable prenatally as it is normally present in fetal life.

## 2025-06-16 NOTE — PROGRESS NOTES
CONSULTATION: MATERNAL-FETAL MEDICINE    Dear Anni Jain, Evgeny  [address]  [address]  [place] [address],    Thank you very much for your kind referral of patient Nessa Calabrese for Maternal-Fetal Medicine consultation. As you know, Nessa is a 30 y.o.  at 18w0d presenting for consultation for genetic screening.  She has no complaints today.    Her history is significant for:  Problem List[1]    Obstetric History:  OB History    Para Term  AB Living   3 2 2   2   SAB IAB Ectopic Multiple Live Births      0 2      # Outcome Date GA Lbr Tyrone/2nd Weight Sex Type Anes PTL Lv   3 Current            2 Term 24 40w5d / 01:02 3402 g (7 lb 8 oz) F Vag-Spont EPI N BRIGHT   1 Term 22 39w5d  3120 g (6 lb 14.1 oz) M CS-LTranv EPI, Spinal  BRIGHT       Past Medical History:  Past Medical History[2]      Past Surgical History:  Past Surgical History[3]    Social History:   She denies current use of alcohol, drugs of abuse, tobacco, and marijuana products.   Occupation: She is a    Partner: Ricci is 38 and works in construction    Family History:  Family history was reviewed using an office screening tool, and includes her prior son with PAX6 gene deletion/WAGR syndrome, and personal history of patent PFO.    Medications:  Current Medications[4]    Allergies:  No Known Allergies         Exam:  Vitals: Blood pressure 94/52, pulse 99, height 5' (1.524 m), weight 57.7 kg (127 lb 3.2 oz), currently breastfeeding.  Physical Exam  On examination, she is awake, alert and oriented. Mood and affect are appropriate.  The remainder of her physical examination was deferred as she was here today for consultation and discussion.    My recommendations are as follows:     18 weeks gestation of pregnancy  We reviewed the reassuring anatomic survey and cervical length today. Today's ultrasound also serves as her first formal dating ultrasound in this pregnancy.    See today's [facility] US  "report under \"imaging\" tab. Although encouraging, even a normal-appearing ultrasound cannot exclude all malformations, or the possibility of a genetic syndrome.     We reviewed the availability of genetic screening, as well as diagnostic testing, which are available to all pregnant women. We reviewed limitations, risks, and benefits of screening and testing. 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  She does not wish to pursue diagnostic testing at this time.    MSAFP screening should be offered and ordered through your office by 20 weeks gestation,       Family history of congenital or genetic condition  Nessa had genetic counseling in her prior pregnancy for her son's PAX6 gene deletion/WAGR syndrome.  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    Last pregnancy she elected to proceed with MaterniT Genome, which was discussed with her again today including limitations of false negative and false positive with microdeletion screening. She declines CVS or amniocentesis at this time.        Short interval between pregnancies affecting pregnancy, antepartum  Short Interval Pregnancy: Short interval between pregnancies (<18 months between pregnancies) is associated with increased risk for  birth and low birthweight. A third trimester growth ultrasound is recommended.      Congenital heart disease in pregnancy  We reviewed her history of a PFO which was closed surgically. She had a normal echo with evidence of prior closure device in . An updated echocardiogram and evaluation by cardiology at least once this pregnancy is advised and a referral was placed. This is considered a mWHO class 1 lesion without significantly increased risk of adverse maternal outcomes if her evaluation and updated echocardiogram are unremarkable.    Fetal echo advised at 22-24 weeks as it " "was completed in prior pregnancies as well. Discussed that failure of PFO closure is undetectable prenatally as it is normally present in fetal life.    History of  delivery, antepartum  S/p  in subsequent pregnancy. Now with short interval pregnancy. Placenta is posterior on today's ultrasound without suspicion for PAS     Please see today's [facility] ultrasound report documented separately under \"imaging\" tab in Epic.       Sincerely,    Angy Ram MD  Attending Physician, Maternal-Fetal Medicine  [facility]    VIRTUAL VISIT DISCLAIMER    Nessa Calabrese acknowledges that she has consented to an online visit or consultation. She understands that the online visit is based solely on information provided by her, and that, in the absence of a face-to-face physical evaluation by the physician, the diagnosis she receives is both limited and provisional in terms of accuracy and completeness. This is not intended to replace a full medical face-to-face evaluation by the physician. Nessa Calabrese understands and accepts these terms. Patient Location [facility]    The patient was identified by name and date of birth. Nessa Calabrese was informed that this is a telemedicine visit and that the visit is being conducted through the [employer] Teams platform. She agrees to proceed..  My office door was closed. No one else was in the room.  She acknowledged consent and understanding of privacy and security of the video platform. The patient has agreed to participate and understands they can discontinue the visit at any time. Patient is aware this is a billable service.        [1]   Patient Active Problem List  Diagnosis    Family history of breast cancer    History of anemia    Vaginal birth after  ()    Supervision of normal pregnancy    History of  delivery, antepartum    Family history of congenital or genetic condition    Maternal anemia in pregnancy, antepartum    " Short interval between pregnancies affecting pregnancy, antepartum    Congenital heart disease in pregnancy    Encounter for  screening for uncertain dates    18 weeks gestation of pregnancy   [2]   Past Medical History:  Diagnosis Date    Acne     Heart valve disease     congenital patent ductus arteriosus    Status post primary low transverse  section 2022    Varicella     had vaccines    Verruca     On hand   [3]   Past Surgical History:  Procedure Laterality Date    CARDIAC SURGERY      age 1    CHOLECYSTECTOMY  2019    [place]  DELIVERY ONLY N/A 2022    Procedure:  SECTION ();  Surgeon: Arlin Gracia MD;  Location: [facility] LD;  Service: Obstetrics    TN EXTERNAL CEPHALIC VERSION W/WO TOCOLYSIS N/A 2022    Procedure: VERSION EXTERNAL CEPHALIC;  Surgeon: Arlin Gracia MD;  Location: [facility];  Service: Obstetrics   [4]   Current Outpatient Medications:     Ferrous Sulfate (Iron) 325 (65 Fe) MG TABS, Take by mouth, Disp: , Rfl:     Prenatal MV & Min w/FA-DHA (PRENATAL GUMMIES PO), Take by mouth, Disp: , Rfl:

## 2025-06-16 NOTE — ASSESSMENT & PLAN NOTE
Nessa had genetic counseling in her prior pregnancy for her son's PAX6 gene deletion/WAGR syndrome.  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    Last pregnancy she elected to proceed with MaterniT Genome, which was discussed with her again today including limitations of false negative and false positive with microdeletion screening. She declines CVS or amniocentesis at this time.

## 2025-06-20 ENCOUNTER — RESULTS FOLLOW-UP (OUTPATIENT)
Dept: PERINATAL CARE | Facility: OTHER | Age: 30
End: 2025-06-20

## 2025-06-20 LAB
5P15 DELETION (CRI-DU-CHAT): NOT DETECTED
CFDNA.FET/CFDNA.TOTAL SFR FETUS: NORMAL %
CFDNA.FET/CFDNA.TOTAL SFR FETUS: NORMAL %
CITATION REF LAB TEST: NORMAL
FET 13+18+21+X+Y ANEUP PLAS.CFDNA: NEGATIVE
FET 1P36 DEL RISK WBC.DNA+CFDNA QL: NOT DETECTED
FET 22Q11.2 DEL RISK WBC.DNA+CFDNA QL: NOT DETECTED
FET CHR 11Q23 DEL PLAS.CFDNA QL: NOT DETECTED
FET CHR 15Q11 DEL PLAS.CFDNA QL: NOT DETECTED
FET CHR 21 TS PLAS.CFDNA QL: NEGATIVE
FET CHR 21 TS PLAS.CFDNA QL: NEGATIVE
FET CHR 4P16 DEL PLAS.CFDNA QL: NOT DETECTED
FET CHR 8Q24 DEL PLAS.CFDNA QL: NOT DETECTED
FET MS X RISK WBC.DNA+CFDNA QL: NOT DETECTED
FET SEX PLAS.CFDNA DOSAGE CFDNA: NORMAL
FET TS 13 RISK PLAS.CFDNA QL: NEGATIVE
FET X + Y ANEUP RISK PLAS.CFDNA SEQ-IMP: NOT DETECTED
GA EST FROM CONCEPTION DATE: NORMAL D
GESTATIONAL AGE > 9:: YES
LAB DIRECTOR NAME PROVIDER: NORMAL
LABORATORY COMMENT REPORT: NORMAL
LIMITATIONS OF THE TEST: NORMAL
NEGATIVE PREDICTIVE VALUE: NORMAL
PERFORMANCE CHARACTERISTICS: NORMAL
POSITIVE PREDICTIVE VALUE: NORMAL
REF LAB TEST METHOD: NORMAL
SL AMB NOTE:: NORMAL
TEST PERFORMANCE INFO SPEC: NORMAL
TRISOMY 16: NOT DETECTED
TRISOMY 22: NOT DETECTED

## 2025-07-10 ENCOUNTER — INITIAL PRENATAL (OUTPATIENT)
Dept: OBGYN CLINIC | Facility: CLINIC | Age: 30
End: 2025-07-10

## 2025-07-10 VITALS — HEIGHT: 60 IN | WEIGHT: 127 LBS | BODY MASS INDEX: 24.94 KG/M2

## 2025-07-10 DIAGNOSIS — Z34.82 PRENATAL CARE, SUBSEQUENT PREGNANCY, SECOND TRIMESTER: Primary | ICD-10-CM

## 2025-07-10 PROCEDURE — OBC

## 2025-07-10 NOTE — PROGRESS NOTES
OB INTAKE INTERVIEW  Patient is 30 y.o. who presents for OB intake at 21 wks  She is accompanied by herself during this telephone encounter  The father of her baby (Ricci Calabrese) is involved in the pregnancy and is 38 years old.      Last Menstrual Period: unknown was breastfeeding and did not get a menses since her last delivery  Ultrasound: Measured 18 weeks 0 days on 25  Estimated Date of Delivery:  by 18 week US    Signs/Symptoms of Pregnancy  Current pregnancy symptoms: nausea still- feeling but no vomiting, starting to feel movement  Constipation no  Headaches no  Cramping/spotting no  PICA cravings no    Diabetes-  Body mass index is 24.8 kg/m².  If patient has 1 or more, please order early 1 hour GTT  History of GDM no  BMI >35 no  History of PCOS or current metformin use no  History of LGA/macrosomic infant (4000g/9lbs) no    If patient has 2 or more, please order early 1 hour GTT  BMI>30 no  AMA no  First degree relative with type 2 diabetes no  History of chronic HTN, hyperlipidemia, elevated A1C no  High risk race (, , ,  or ) no    Hypertension- if you answer yes to any of the following, please order baseline preeclampsia labs (cbc, comprehensive metabolic panel, urine protein creatinine ratio, uric acid)  History of of chronic HTN no  History of gestational HTN no  History of preeclampsia, eclampsia, or HELLP syndrome no  History of diabetes no  History of lupus,sjogrens syndrome, kidney disease no    Thyroid- if yes order TSH with reflex T4  History of thyroid disease no    Bleeding Disorder or Hx of DVT-patient or first degree relative with history of. Order the following if not done previously.   (Factor V, antithrombin III, prothrombin gene mutation, protein C and S Ag, lupus anticoagulant, anticardiolipin, beta-2 glycoprotein)   no    OB/GYN-  History of abnormal pap smear no       Date of last pap smear  21  History of HPV no  History of Herpes/HSV no  History of other STI (gonorrhea, chlamydia, trich) no  History of prior  YES  History of prior  YES  History of  delivery prior to 36 weeks 6 days no  History of Varicella or Vaccination had vaccines  History of blood transfusion no  Ok for blood transfusion YES    Substance screening-   History of tobacco use no  Currently using tobacco no  Substance Use Screen Level Substance Use Risk Level: No Risk    Depression Screen  EPDS 3     MRSA Screening-   Does the pt have a hx of MRSA? no    Immunizations:  Influenza vaccine given this season no  Discussed Tdap vaccine yes  Discussed COVID Vaccine no  Reviewed Abrysvo Status yes  Reviewed MMR Status Yes    Genetic/MFM-  Do you or your partner have a history of any of the following in yourselves or first degree relatives?  Cystic fibrosis no  Spinal muscular atrophy no  Hemoglobinopathy/Sickle Cell/Thalassemia no  Fragile X Intellectual Disability no    If yes, discuss Carrier Screening and recommend consultation with MFM/Genetic Counseling and place specific Genetic Referral (REF26) for.    If no, discuss Carrier Screening being completed once in a lifetime as a standard of care lab test. Place orders for Inheritest CF/SMA Panel--[id number]. If patient has had one done so you don't need panel place order for Cystic Fibrosis Gene Test ([id number]) or Spinal Muscular Atrophy DNA ([id number])      Appointment for Nuchal Translucency Ultrasound at [facility] scheduled for 7/15- anatomy scan       Interview education  [facility] Pregnancy Essentials Book reviewed, discussed and attached to their AVS YES    Nurse/Family Partnership- patient may qualify no; referral placed no    Prenatal lab work scripts yes  Extra labs ordered:  none    Preeclampsia Risk Factor Screening    The ACOG Committee on Obstetric practice nubmer 743 concludes that low-dose aspirin should be recommended for patients with any high-risk factors and  considered for patients with more than 1 moderate-risk factor.     Screening for preeclampsia risk factors was performed and was significant for the following:    High risk factors:     Preeclampsia in a previous pregnancy: No  Multifetal pregnancy: No  Chronic Hypertension: No  Diabetes mellitus (type 1 or type 2): No  Kidney disease: No  Autoimmune disorder (lupus, rheumatoid arthritis, etc.): No  Antiphospholipid or anticardiolipin syndrome: No       Moderate-risk factors:    Nulliparity: No  Obesity (BMI >= 30 kg/m2): No  Mother or sister had preeclampsia: No   ancestry based on patient self-report: No  Low socioeconomic status: No  Maternal age will be 35 or greater on the estimated due date: No  Patient born with low birthweight or small for gestational age: No  Previous pregnancy with small for gestational age or other adverse outcome: No  Interpregnancy interval more than 10 years: No  IVF pregnancy (current pregnancy): No         Based on the ACOG and SMFM Committee opinion number 743, low-dose asiprin is not indicated      Contraindications to ASA therapy:  NSAID/ ASA allergy: no  Asthma with history of ASA induced bronchospasm: no  Relative contraindications:  History of GI bleed: no  Active peptic ulcer disease: no  Severe hepatic dysfunction: no        The patient has a history now or in prior pregnancy notable for:     Hx of  x 1 then successful  X 1, hx of genetic disorder in son, hx of congential heart defect in mother (Cardiology appointment in Sept)       Details that I feel the provider should be aware of: This is a planned and  welcomed pregnancy for Nessa and her significant other Ricci.  She is a previous patient to [facility]. This is their third child. Their son Cornelio (3) was born via  for NR NST and failed version for breech. She then had a successful  for her daughter Zhen last year. She is overall feeling well so far, still having some mild nausea daily.  Patient is aware of PN1 labs and to have them completed before her next appointment. Blue folder was not given and reviewed today as this was a telephone encounter.     PN1 visit scheduled. The patient was oriented to our practice, the navigator role, reviewed delivering physicians and [facility] for Delivery. All questions were answered.    Interviewed by: Anna Easton RN

## 2025-07-10 NOTE — PATIENT INSTRUCTIONS
Congratulations!! Please review our Pregnancy Essential Guide and [facility] L&D Virtual tour from our networks website.     [facility] Pregnancy Essentials Guide  [facility] ([email or web address])     [facility] ([facility]

## 2025-07-15 PROBLEM — Z34.90 SUPERVISION OF NORMAL PREGNANCY: Status: RESOLVED | Noted: 2023-12-10 | Resolved: 2025-07-15

## 2025-07-15 PROBLEM — Z36.87 ENCOUNTER FOR ANTENATAL SCREENING FOR UNCERTAIN DATES: Status: RESOLVED | Noted: 2025-06-16 | Resolved: 2025-07-15

## 2025-07-15 PROBLEM — Z3A.22 22 WEEKS GESTATION OF PREGNANCY: Status: ACTIVE | Noted: 2025-06-16

## 2025-07-15 NOTE — PROGRESS NOTES
Name: Nessa Calabrese      : 1995      MRN: [id number]  Encounter Provider: HOPE Collazo  Encounter Date: 2025   Encounter department: [facility] OBSTETRICS [facility]YNECOLOG[facility]  :  Assessment & Plan  Prenatal care, subsequent pregnancy, second trimester  Here for 1st OB appointment accompanied by no one.   Feels well.   Pap/GC/CT done today.   Denies LOF/CTX/VB.   No pregnancy concerns expressed by patient.   All questions answered.   Oriented to our practice.   Blue folder given and reviewed at Intake.          22 weeks gestation of pregnancy  PN 1 labs not done yet-reminded to do so, if possible downstairs  Maternity 21 negative  MSAFP-missed window  Blue folder given and reviewed  FG appt on  at 32 wks  Fetal echo on        Short interval between pregnancies affecting pregnancy, antepartum  Will follow with MFM       Vaginal birth after  ()  Plans-not discussed today       Congenital heart disease in pregnancy  Hx of PDA- repaired at age 1, cleared by cardiology in early teens   Cardiology appt with Dr DONG on

## 2025-07-15 NOTE — ASSESSMENT & PLAN NOTE
PN 1 labs not done yet-reminded to do so, if possible downstairs  Maternity 21 negative  MSAFP-missed window  Blue folder given and reviewed  FG appt on 9/22 at 32 wks  Fetal echo on 7/21

## 2025-07-15 NOTE — ASSESSMENT & PLAN NOTE
Hx of PDA- repaired at age 1, cleared by cardiology in early teens   Cardiology appt with Dr DONG on 9/11

## 2025-07-15 NOTE — PATIENT INSTRUCTIONS
Patient Education     Pregnancy - The Fifth Month   About this topic   It is important for you to learn how to take care of yourself to help you have a healthy baby and safe delivery. It is good to have health care throughout your pregnancy.  The fifth month of your pregnancy starts around week 19 and lasts through week 23. By knowing how far along you are, you can learn what is normal for this stage of your pregnancy and plan for what is next.  General   Growth and Development   During the fifth month of your pregnancy, here are some things you can expect.  You may:  Start to gain a little more weight. It is normal to gain about 10 to 15 pounds (4.5 to 7 kg) total in your first 5 months.  Have leg cramps. Be sure to drink plenty of water.  Have loose teeth.  Have more trouble breathing or with heartburn as your baby gets bigger  Start having [place] contractions. You may feel your belly squeezing or getting tight. Be sure to drink 6 to 8 glasses of water each day.  Notice you are able to express milk from your breasts  See stretch marks on your belly, breasts, or legs. Stay active to try and keep good muscle tone.  Be checked for gestational diabetes  Your baby's growth and development:  Your baby is covered with a thick whitish substance called vernix. This helps protect your baby’s skin.  Their genitals are able to be seen on an ultrasound. Your doctor will check your baby’s size, how much fluid there is around your baby, and all of your baby’s organs with the ultrasound.  Your baby is starting to be able to see, hear, taste, and feel touch.  The bones are starting to make blood cells.  Your baby is about 11 inches (28 cm) long and weighs about 1 pound (450 gm). Your baby is about the size of a grapefruit.  Things to Think About   Avoid alcohol, drugs, tobacco products, and second hand smoke  Do not clean your cat litter box. You could get a disease that causes birth defects to your baby.  Check with your  doctor before taking any kind of drugs. Continue to take your vitamin with folic acid.  Do you plan to breastfeed your baby?  Where will you deliver? Do you plan to take prenatal classes? If so, try to have them completed by your 8th month.  Start to think about your plans for pain control during labor.  You may want to learn about cord blood banking.  Rest and take breaks each day.  When do I need to call the doctor?   Contractions every 10 minutes or more often that do not go away with drinking water or position changes  Low, dull back pain that does not go away  Pressure in your pelvis that feels like your baby is pushing down  A gush or constant trickle of watery or bloody fluid leaking from your vagina  Cramps in your lower belly that come and go or are constant  Little to no movement felt by baby in 2 hours. Your baby should move at least 10 times every 2 hours.  Headache that does not go away, blurry vision, seeing spots or halos, increase in swelling in your hands, feet, or face, and pain under your ribs on the right side  Fever of 100.4°F (38°C) or higher  Bleeding or swelling of your gums  Urinating less, or having pain when you urinate  Vaginal bleeding with or without pain  After a car accident, fall, or any trauma to your belly  Having thoughts of harming yourself or others, or do not feel safe at home  Last Reviewed Date   2020-04-20  Consumer Information Use and Disclaimer   This generalized information is a limited summary of diagnosis, treatment, and/or medication information. It is not meant to be comprehensive and should be used as a tool to help the user understand and/or assess potential diagnostic and treatment options. It does NOT include all information about conditions, treatments, medications, side effects, or risks that may apply to a specific patient. It is not intended to be medical advice or a substitute for the medical advice, diagnosis, or treatment of a health care provider based on  the health care provider's examination and assessment of a patient’s specific and unique circumstances. Patients must speak with a health care provider for complete information about their health, medical questions, and treatment options, including any risks or benefits regarding use of medications. This information does not endorse any treatments or medications as safe, effective, or approved for treating a specific patient. [employer] and its affiliates disclaim any warranty or liability relating to this information or the use thereof. The use of this information is governed by the Terms of Use, available at https://www.[employer].com/en/know/clinical-effectiveness-terms   Copyright   Copyright © 2024 [employer] and its affiliates and/or licensors. All rights reserved.

## 2025-07-18 ENCOUNTER — INITIAL PRENATAL (OUTPATIENT)
Age: 30
End: 2025-07-18

## 2025-07-18 VITALS
BODY MASS INDEX: 25.64 KG/M2 | WEIGHT: 130.6 LBS | HEIGHT: 60 IN | DIASTOLIC BLOOD PRESSURE: 62 MMHG | HEART RATE: 94 BPM | SYSTOLIC BLOOD PRESSURE: 92 MMHG

## 2025-07-18 DIAGNOSIS — O34.219 VAGINAL BIRTH AFTER CESAREAN (VBAC): ICD-10-CM

## 2025-07-18 DIAGNOSIS — Z11.3 SCREENING FOR STD (SEXUALLY TRANSMITTED DISEASE): ICD-10-CM

## 2025-07-18 DIAGNOSIS — Z3A.22 22 WEEKS GESTATION OF PREGNANCY: ICD-10-CM

## 2025-07-18 DIAGNOSIS — O99.891 CONGENITAL HEART DISEASE IN PREGNANCY: ICD-10-CM

## 2025-07-18 DIAGNOSIS — O09.899 SHORT INTERVAL BETWEEN PREGNANCIES AFFECTING PREGNANCY, ANTEPARTUM: ICD-10-CM

## 2025-07-18 DIAGNOSIS — Q24.9 CONGENITAL HEART DISEASE IN PREGNANCY: ICD-10-CM

## 2025-07-18 DIAGNOSIS — Z34.82 PRENATAL CARE, SUBSEQUENT PREGNANCY, SECOND TRIMESTER: Primary | ICD-10-CM

## 2025-07-18 PROCEDURE — G0476 HPV COMBO ASSAY CA SCREEN: HCPCS | Performed by: NURSE PRACTITIONER

## 2025-07-18 PROCEDURE — G0145 SCR C/V CYTO,THINLAYER,RESCR: HCPCS | Performed by: PATHOLOGY

## 2025-07-18 PROCEDURE — 87491 CHLMYD TRACH DNA AMP PROBE: CPT | Performed by: NURSE PRACTITIONER

## 2025-07-18 PROCEDURE — PNV: Performed by: NURSE PRACTITIONER

## 2025-07-18 PROCEDURE — 87591 N.GONORRHOEAE DNA AMP PROB: CPT | Performed by: NURSE PRACTITIONER

## 2025-07-18 NOTE — PROGRESS NOTES
Pt is here for pn1 visit @ 22wks   No concerns at this time  Denies discharge, LOF, or VB  Has some cramping  Pn1 Labs not completed  Last Pap 8/2021 NILM  GC/CHL Collected today   Blue Folder given/reviewed with pt

## 2025-07-21 ENCOUNTER — ANCILLARY PROCEDURE (OUTPATIENT)
Dept: PERINATAL CARE | Facility: OTHER | Age: 30
End: 2025-07-21
Attending: NURSE PRACTITIONER

## 2025-07-21 ENCOUNTER — ROUTINE PRENATAL (OUTPATIENT)
Dept: PERINATAL CARE | Facility: OTHER | Age: 30
End: 2025-07-21

## 2025-07-21 VITALS
WEIGHT: 131.4 LBS | HEIGHT: 60 IN | SYSTOLIC BLOOD PRESSURE: 90 MMHG | BODY MASS INDEX: 25.8 KG/M2 | DIASTOLIC BLOOD PRESSURE: 60 MMHG | HEART RATE: 85 BPM

## 2025-07-21 DIAGNOSIS — O09.899 SHORT INTERVAL BETWEEN PREGNANCIES AFFECTING PREGNANCY, ANTEPARTUM: ICD-10-CM

## 2025-07-21 DIAGNOSIS — Z82.79 FAMILY HISTORY OF CONGENITAL OR GENETIC CONDITION: ICD-10-CM

## 2025-07-21 DIAGNOSIS — O34.219 HISTORY OF CESAREAN DELIVERY, ANTEPARTUM: Primary | ICD-10-CM

## 2025-07-21 DIAGNOSIS — O99.891 CONGENITAL HEART DISEASE IN PREGNANCY: ICD-10-CM

## 2025-07-21 DIAGNOSIS — Z3A.22 22 WEEKS GESTATION OF PREGNANCY: ICD-10-CM

## 2025-07-21 DIAGNOSIS — Q24.9 CONGENITAL HEART DISEASE IN PREGNANCY: ICD-10-CM

## 2025-07-21 LAB
HPV HR 12 DNA CVX QL NAA+PROBE: POSITIVE
HPV16 DNA CVX QL NAA+PROBE: NEGATIVE
HPV18 DNA CVX QL NAA+PROBE: NEGATIVE

## 2025-07-21 PROCEDURE — 93325 DOPPLER ECHO COLOR FLOW MAPG: CPT | Performed by: OBSTETRICS & GYNECOLOGY

## 2025-07-21 PROCEDURE — 99212 OFFICE O/P EST SF 10 MIN: CPT | Performed by: OBSTETRICS & GYNECOLOGY

## 2025-07-21 PROCEDURE — 76825 ECHO EXAM OF FETAL HEART: CPT | Performed by: OBSTETRICS & GYNECOLOGY

## 2025-07-21 PROCEDURE — 76827 ECHO EXAM OF FETAL HEART: CPT | Performed by: OBSTETRICS & GYNECOLOGY

## 2025-07-21 NOTE — LETTER
"2025     Shirley Mendieta PA-C  [address] [place] [id number]    Patient: Nessa Calabrese   YOB: 1995   Date of Visit: 2025       Dear Dr. Shirley Mendieta PA-C:    Thank you for referring Nessa Calabrese to me for evaluation. Below are my notes for this consultation.    If you have questions, please do not hesitate to call me. I look forward to following your patient along with you.         Sincerely,        Kathi Aponte MD        CC: No Recipients    Kathi Aponte MD  2025  9:11 AM  Sign when Signing Visit  FOLLOW-UP: MATERNAL-FETAL MEDICINE  Name: Nessa Calabrese      : 1995      MRN: [id number]  Encounter Provider: Kathi Aponte MD  Encounter Date: 2025   Encounter department: [facility] [place]  :  Assessment & Plan  History of  delivery, antepartum  Is planning on .        Family history of congenital or genetic condition  History of a coil closure of patent ductus arteriosus at 1 year of age.        Short interval between pregnancies affecting pregnancy, antepartum  32 week follow up growth recommended        Congenital heart disease in pregnancy  Prior patent ductus arteriosus requiring a chordal closure at 1 year of age           History of Present Illness    Nessa Calabrese is a 30 y.o. female  at 23w0d who presents for fetal echocardiogram. She reports no obstetric complaints today.    Objective  BP 90/60 (BP Location: Right arm, Patient Position: Sitting, Cuff Size: Standard)   Pulse 85   Ht 5' (1.524 m)   Wt 59.6 kg (131 lb 6.4 oz)   LMP  (LMP Unknown)   BMI 25.66 kg/m²      No LMP recorded (lmp unknown). Patient is pregnant.  Estimated Delivery Date: 2025, by Ultrasound      Please refer to \"Imaging\" for ultrasound report from today's visit.     Pre visit time reviewing her records 5 minutes  Face to face time 5 minutes  Post visit time on documentation of note, updating her " problem list, adding orders and prescriptions 5 minutes.  Procedures that were completed today were charged separately.   The level of decision making was straight forward    Kathi Aponte M.D.

## 2025-07-21 NOTE — PROGRESS NOTES
"FOLLOW-UP: MATERNAL-FETAL MEDICINE  Name: Nessa Calabrese      : 1995      MRN: [id number]  Encounter Provider: Kathi Aponte MD  Encounter Date: 2025   Encounter department: [facility] [place]  :  Assessment & Plan  History of  delivery, antepartum  Is planning on .        Family history of congenital or genetic condition  History of a coil closure of patent ductus arteriosus at 1 year of age.        Short interval between pregnancies affecting pregnancy, antepartum  32 week follow up growth recommended        Congenital heart disease in pregnancy  Prior patent ductus arteriosus requiring a chordal closure at 1 year of age           History of Present Illness     Nessa Calabrese is a 30 y.o. female  at 23w0d who presents for fetal echocardiogram. She reports no obstetric complaints today.    Objective   BP 90/60 (BP Location: Right arm, Patient Position: Sitting, Cuff Size: Standard)   Pulse 85   Ht 5' (1.524 m)   Wt 59.6 kg (131 lb 6.4 oz)   LMP  (LMP Unknown)   BMI 25.66 kg/m²      No LMP recorded (lmp unknown). Patient is pregnant.  Estimated Delivery Date: 2025, by Ultrasound      Please refer to \"Imaging\" for ultrasound report from today's visit.     Pre visit time reviewing her records 5 minutes  Face to face time 5 minutes  Post visit time on documentation of note, updating her problem list, adding orders and prescriptions 5 minutes.  Procedures that were completed today were charged separately.   The level of decision making was straight forward    Kathi Aponte M.D.  "

## 2025-07-21 NOTE — LETTER
"2025     Shirley Mendieta PA-C  [address] [place] [id number]    Patient: Nessa Calabrese   YOB: 1995   Date of Visit: 2025       Dear Dr. Shirley Mendieta PA-C:    Thank you for referring Nessa Calabrese to me for evaluation. Below are my notes for this consultation.    If you have questions, please do not hesitate to call me. I look forward to following your patient along with you.         Sincerely,        Kathi Aponte MD        CC: No Recipients    Kathi Aponte MD  2025  8:59 AM  Incomplete  FOLLOW-UP: MATERNAL-FETAL MEDICINE  Name: Nessa Calabrese      : 1995      MRN: [id number]  Encounter Provider: Kathi Aponte MD  Encounter Date: 2025   Encounter department: [facility] [place]  :  Assessment & Plan  History of  delivery, antepartum         Family history of congenital or genetic condition         Short interval between pregnancies affecting pregnancy, antepartum  32 week follow up growth recommended            History of Present Illness{?Quick Links Encounters * My Last Note * Last Note in Specialty * Snapshot * Since Last Visit * History :68059}    Nessa Calabrese is a 30 y.o. female  at 23w0d who presents for {blank single:75674::\"***\",\"dating ultrasound\",\"nuchal translucency ultrasound\",\"serial cervical length screening ultrasound\",\"anatomic survey ultrasound\",\"anatomic survey and cervical length screening ultrasound\",\"followup missed anatomy ultrasound\",\"fetal echocardiogram\",\"fetal growth assessment ultrasound\",\"limited OB ultrasound\",\"fetal growth and followup missed anatomy ultrasound\",\"followup placental location ultrasound with fetal growth measurement\",\"umbilical artery Doppler study and SIMONE\",\"umbilical artery Doppler study, SIMONE, and NST\",\"umbilical artery Doppler study, SIMONE, NST, and growth ultrasound\",\"TTTS surveillance\",\"NST (found under the pregnancy episode) which I reviewed the RN " "assessment and agree\",\"NST (found under the pregnancy episode) which I reviewed the RN assessment and agree, and SIMONE (see ultrasound report under Imaging tab)\",\"NST (found under the pregnancy episode) which I reviewed the RN assessment and agree, and fetal growth ultrasound (see ultrasound report under Imaging tab)\"}. She reports {[facility] HPI (Optional):03974}.    Objective{?Quick Links Trend Vitals * Enter New Vitals * Results Review * Timeline (Adult) * Labs * Imaging * Cardiology * Procedures * Lung Cancer Screening * Surgical eConsent :47528}  BP 90/60 (BP Location: Right arm, Patient Position: Sitting, Cuff Size: Standard)   Pulse 85   Ht 5' (1.524 m)   Wt 59.6 kg (131 lb 6.4 oz)   LMP  (LMP Unknown)   BMI 25.66 kg/m²      No LMP recorded (lmp unknown). Patient is pregnant.  Estimated Delivery Date: 11/17/2025, by Ultrasound    Physical Exam    Please refer to \"Imaging\" for ultrasound report from today's visit.     {Administrative / Billing Section (Optional):00233}   "

## 2025-07-22 LAB
C TRACH DNA SPEC QL NAA+PROBE: NEGATIVE
N GONORRHOEA DNA SPEC QL NAA+PROBE: NEGATIVE

## 2025-07-24 LAB
LAB AP GYN PRIMARY INTERPRETATION: ABNORMAL
Lab: ABNORMAL
PATH INTERP SPEC-IMP: ABNORMAL

## 2025-07-26 ENCOUNTER — APPOINTMENT (OUTPATIENT)
Dept: LAB | Facility: HOSPITAL | Age: 30
End: 2025-07-26

## 2025-07-26 DIAGNOSIS — Z34.82 PRENATAL CARE, SUBSEQUENT PREGNANCY, SECOND TRIMESTER: ICD-10-CM

## 2025-07-26 LAB
ABO GROUP BLD: NORMAL
BACTERIA UR QL AUTO: ABNORMAL /HPF
BASOPHILS # BLD AUTO: 0.02 THOUSANDS/ÂΜL (ref 0–0.1)
BASOPHILS NFR BLD AUTO: 0 % (ref 0–1)
BILIRUB UR QL STRIP: NEGATIVE
BLD GP AB SCN SERPL QL: NEGATIVE
CLARITY UR: CLEAR
COLOR UR: COLORLESS
EOSINOPHIL # BLD AUTO: 0.08 THOUSAND/ÂΜL (ref 0–0.61)
EOSINOPHIL NFR BLD AUTO: 1 % (ref 0–6)
ERYTHROCYTE [DISTWIDTH] IN BLOOD BY AUTOMATED COUNT: 13.2 % (ref 11.6–15.1)
GLUCOSE UR STRIP-MCNC: NEGATIVE MG/DL
HBV SURFACE AB SER-ACNC: 162 MIU/ML
HBV SURFACE AG SER QL: NORMAL
HCT VFR BLD AUTO: 35.8 % (ref 34.8–46.1)
HCV AB SER QL: NORMAL
HGB BLD-MCNC: 12.5 G/DL (ref 11.5–15.4)
HGB UR QL STRIP.AUTO: ABNORMAL
HIV 1+2 AB+HIV1 P24 AG SERPL QL IA: NORMAL
IMM GRANULOCYTES # BLD AUTO: 0.04 THOUSAND/UL (ref 0–0.2)
IMM GRANULOCYTES NFR BLD AUTO: 1 % (ref 0–2)
KETONES UR STRIP-MCNC: NEGATIVE MG/DL
LEUKOCYTE ESTERASE UR QL STRIP: ABNORMAL
LYMPHOCYTES # BLD AUTO: 1.18 THOUSANDS/ÂΜL (ref 0.6–4.47)
LYMPHOCYTES NFR BLD AUTO: 17 % (ref 14–44)
MCH RBC QN AUTO: 32.2 PG (ref 26.8–34.3)
MCHC RBC AUTO-ENTMCNC: 34.9 G/DL (ref 31.4–37.4)
MCV RBC AUTO: 92 FL (ref 82–98)
MONOCYTES # BLD AUTO: 0.37 THOUSAND/ÂΜL (ref 0.17–1.22)
MONOCYTES NFR BLD AUTO: 5 % (ref 4–12)
NEUTROPHILS # BLD AUTO: 5.18 THOUSANDS/ÂΜL (ref 1.85–7.62)
NEUTS SEG NFR BLD AUTO: 76 % (ref 43–75)
NITRITE UR QL STRIP: NEGATIVE
NON-SQ EPI CELLS URNS QL MICRO: ABNORMAL /HPF
NRBC BLD AUTO-RTO: 0 /100 WBCS
PH UR STRIP.AUTO: 7 [PH]
PLATELET # BLD AUTO: 240 THOUSANDS/UL (ref 149–390)
PMV BLD AUTO: 10.6 FL (ref 8.9–12.7)
PROT UR STRIP-MCNC: NEGATIVE MG/DL
RBC # BLD AUTO: 3.88 MILLION/UL (ref 3.81–5.12)
RBC #/AREA URNS AUTO: ABNORMAL /HPF
RH BLD: POSITIVE
RUBV IGG SERPL IA-ACNC: 27.9 IU/ML
SP GR UR STRIP.AUTO: 1.01 (ref 1–1.03)
SPECIMEN EXPIRATION DATE: NORMAL
TREPONEMA PALLIDUM IGG+IGM AB [PRESENCE] IN SERUM OR PLASMA BY IMMUNOASSAY: NORMAL
UROBILINOGEN UR STRIP-ACNC: <2 MG/DL
WBC # BLD AUTO: 6.87 THOUSAND/UL (ref 4.31–10.16)
WBC #/AREA URNS AUTO: ABNORMAL /HPF

## 2025-07-26 PROCEDURE — 86850 RBC ANTIBODY SCREEN: CPT

## 2025-07-26 PROCEDURE — 81001 URINALYSIS AUTO W/SCOPE: CPT

## 2025-07-26 PROCEDURE — 86780 TREPONEMA PALLIDUM: CPT

## 2025-07-26 PROCEDURE — 87086 URINE CULTURE/COLONY COUNT: CPT

## 2025-07-26 PROCEDURE — 86803 HEPATITIS C AB TEST: CPT

## 2025-07-26 PROCEDURE — 87340 HEPATITIS B SURFACE AG IA: CPT

## 2025-07-26 PROCEDURE — 86901 BLOOD TYPING SEROLOGIC RH(D): CPT

## 2025-07-26 PROCEDURE — 87077 CULTURE AEROBIC IDENTIFY: CPT

## 2025-07-26 PROCEDURE — 36415 COLL VENOUS BLD VENIPUNCTURE: CPT

## 2025-07-26 PROCEDURE — 86706 HEP B SURFACE ANTIBODY: CPT

## 2025-07-26 PROCEDURE — 87389 HIV-1 AG W/HIV-1&-2 AB AG IA: CPT

## 2025-07-26 PROCEDURE — 85025 COMPLETE CBC W/AUTO DIFF WBC: CPT

## 2025-07-26 PROCEDURE — 86900 BLOOD TYPING SEROLOGIC ABO: CPT

## 2025-07-26 PROCEDURE — 86762 RUBELLA ANTIBODY: CPT

## 2025-07-27 LAB — BACTERIA UR CULT: ABNORMAL

## 2025-07-28 ENCOUNTER — NURSE TRIAGE (OUTPATIENT)
Age: 30
End: 2025-07-28

## 2025-07-28 DIAGNOSIS — N30.00 ACUTE CYSTITIS WITHOUT HEMATURIA: Primary | ICD-10-CM

## 2025-07-28 RX ORDER — AMOXICILLIN 875 MG/1
875 TABLET, COATED ORAL 2 TIMES DAILY
Qty: 14 TABLET | Refills: 0 | Status: SHIPPED | OUTPATIENT
Start: 2025-07-28 | End: 2025-08-04

## 2025-08-04 ENCOUNTER — ROUTINE PRENATAL (OUTPATIENT)
Dept: OBGYN CLINIC | Facility: CLINIC | Age: 30
End: 2025-08-04

## 2025-08-04 VITALS
DIASTOLIC BLOOD PRESSURE: 76 MMHG | OXYGEN SATURATION: 99 % | BODY MASS INDEX: 26.95 KG/M2 | HEART RATE: 97 BPM | WEIGHT: 138 LBS | SYSTOLIC BLOOD PRESSURE: 114 MMHG

## 2025-08-04 DIAGNOSIS — O34.219 HISTORY OF CESAREAN DELIVERY, ANTEPARTUM: ICD-10-CM

## 2025-08-04 DIAGNOSIS — Q24.9 CONGENITAL HEART DISEASE IN PREGNANCY: ICD-10-CM

## 2025-08-04 DIAGNOSIS — Z3A.25 25 WEEKS GESTATION OF PREGNANCY: Primary | ICD-10-CM

## 2025-08-04 DIAGNOSIS — O34.219 VAGINAL BIRTH AFTER CESAREAN (VBAC): ICD-10-CM

## 2025-08-04 DIAGNOSIS — O99.891 CONGENITAL HEART DISEASE IN PREGNANCY: ICD-10-CM

## 2025-08-04 DIAGNOSIS — O99.019 MATERNAL ANEMIA IN PREGNANCY, ANTEPARTUM: ICD-10-CM

## 2025-08-04 DIAGNOSIS — Z34.82 PRENATAL CARE, SUBSEQUENT PREGNANCY IN SECOND TRIMESTER: ICD-10-CM

## 2025-08-04 DIAGNOSIS — O09.899 SHORT INTERVAL BETWEEN PREGNANCIES AFFECTING PREGNANCY, ANTEPARTUM: ICD-10-CM

## 2025-08-04 PROCEDURE — PNV

## 2025-08-25 PROBLEM — Z3A.28 28 WEEKS GESTATION OF PREGNANCY: Status: ACTIVE | Noted: 2025-06-16

## (undated) DEVICE — SUT PLAIN 2-0 CTX 27 IN 872H

## (undated) DEVICE — SKIN MARKER DUAL TIP WITH RULER CAP, FLEXIBLE RULER AND LABELS: Brand: DEVON

## (undated) DEVICE — SUT MONOCRYL 0 CTX 36 IN Y398H

## (undated) DEVICE — TELFA NON-ADHERENT ABSORBENT DRESSING: Brand: TELFA

## (undated) DEVICE — Device

## (undated) DEVICE — GLOVE PI ULTRA TOUCH SZ.6.5

## (undated) DEVICE — 3M™ STERI-STRIP™ REINFORCED ADHESIVE SKIN CLOSURES, R1542, 1/4 IN X 1-1/2 IN (6 MM X 38 MM), 6 STRIPS/ENVELOPE: Brand: 3M™ STERI-STRIP™

## (undated) DEVICE — PACK C-SECTION PBDS

## (undated) DEVICE — GAUZE SPONGES,16 PLY: Brand: CURITY

## (undated) DEVICE — HYDROPHILIC WOUND DRESSING WITH ZINC PLUS VITAMINS A AND B6.: Brand: DERMAGRAN®-B

## (undated) DEVICE — ABDOMINAL PAD: Brand: DERMACEA

## (undated) DEVICE — SUT MONOCRYL 4-0 PS-2 18 IN Y496G

## (undated) DEVICE — MEDI-VAC YANKAUER SUCTION HANDLE W/STRAIGHT TIP & CONTROL VENT: Brand: CARDINAL HEALTH

## (undated) DEVICE — CHLORAPREP HI-LITE 26ML ORANGE

## (undated) DEVICE — SUT VICRYL 0 CTX 36 IN J978H

## (undated) DEVICE — SPONGE SCRUB 4 PCT CHLORHEXIDINE

## (undated) DEVICE — SUT PDS II 0 CTB-1 27 IN ZB340

## (undated) DEVICE — GLOVE INDICATOR PI UNDERGLOVE SZ 6.5 BLUE